# Patient Record
Sex: MALE | Race: OTHER | HISPANIC OR LATINO
[De-identification: names, ages, dates, MRNs, and addresses within clinical notes are randomized per-mention and may not be internally consistent; named-entity substitution may affect disease eponyms.]

---

## 2017-04-14 PROBLEM — Z00.00 ENCOUNTER FOR PREVENTIVE HEALTH EXAMINATION: Status: ACTIVE | Noted: 2017-04-14

## 2017-04-28 ENCOUNTER — APPOINTMENT (OUTPATIENT)
Dept: VASCULAR SURGERY | Facility: CLINIC | Age: 72
End: 2017-04-28

## 2017-04-28 VITALS
DIASTOLIC BLOOD PRESSURE: 60 MMHG | RESPIRATION RATE: 16 BRPM | HEIGHT: 67 IN | BODY MASS INDEX: 25.11 KG/M2 | TEMPERATURE: 98.4 F | SYSTOLIC BLOOD PRESSURE: 142 MMHG | HEART RATE: 70 BPM | WEIGHT: 160 LBS

## 2017-04-28 DIAGNOSIS — N18.6 END STAGE RENAL DISEASE: ICD-10-CM

## 2017-04-28 DIAGNOSIS — Z87.09 PERSONAL HISTORY OF OTHER DISEASES OF THE RESPIRATORY SYSTEM: ICD-10-CM

## 2017-04-28 DIAGNOSIS — Z99.2 END STAGE RENAL DISEASE: ICD-10-CM

## 2017-04-28 DIAGNOSIS — Z87.19 PERSONAL HISTORY OF OTHER DISEASES OF THE DIGESTIVE SYSTEM: ICD-10-CM

## 2017-05-09 ENCOUNTER — OUTPATIENT (OUTPATIENT)
Dept: OUTPATIENT SERVICES | Facility: HOSPITAL | Age: 72
LOS: 1 days | End: 2017-05-09
Payer: MEDICAID

## 2017-05-09 VITALS
RESPIRATION RATE: 16 BRPM | SYSTOLIC BLOOD PRESSURE: 156 MMHG | TEMPERATURE: 99 F | HEART RATE: 79 BPM | DIASTOLIC BLOOD PRESSURE: 71 MMHG | WEIGHT: 182.1 LBS | HEIGHT: 64 IN | OXYGEN SATURATION: 98 %

## 2017-05-09 DIAGNOSIS — I10 ESSENTIAL (PRIMARY) HYPERTENSION: ICD-10-CM

## 2017-05-09 DIAGNOSIS — Z01.818 ENCOUNTER FOR OTHER PREPROCEDURAL EXAMINATION: ICD-10-CM

## 2017-05-09 DIAGNOSIS — N18.6 END STAGE RENAL DISEASE: ICD-10-CM

## 2017-05-09 DIAGNOSIS — K08.89 OTHER SPECIFIED DISORDERS OF TEETH AND SUPPORTING STRUCTURES: ICD-10-CM

## 2017-05-09 DIAGNOSIS — K21.9 GASTRO-ESOPHAGEAL REFLUX DISEASE WITHOUT ESOPHAGITIS: ICD-10-CM

## 2017-05-09 DIAGNOSIS — Z90.49 ACQUIRED ABSENCE OF OTHER SPECIFIED PARTS OF DIGESTIVE TRACT: Chronic | ICD-10-CM

## 2017-05-09 LAB
ANION GAP SERPL CALC-SCNC: 11 MMOL/L — SIGNIFICANT CHANGE UP (ref 5–17)
APTT BLD: 33.3 SEC — SIGNIFICANT CHANGE UP (ref 27.5–37.4)
BUN SERPL-MCNC: 90 MG/DL — HIGH (ref 7–18)
CALCIUM SERPL-MCNC: 8 MG/DL — LOW (ref 8.4–10.5)
CHLORIDE SERPL-SCNC: 104 MMOL/L — SIGNIFICANT CHANGE UP (ref 96–108)
CO2 SERPL-SCNC: 25 MMOL/L — SIGNIFICANT CHANGE UP (ref 22–31)
CREAT SERPL-MCNC: 4.86 MG/DL — HIGH (ref 0.5–1.3)
GLUCOSE SERPL-MCNC: 228 MG/DL — HIGH (ref 70–99)
HCT VFR BLD CALC: 32 % — LOW (ref 39–50)
HGB BLD-MCNC: 10.5 G/DL — LOW (ref 13–17)
INR BLD: 0.99 RATIO — SIGNIFICANT CHANGE UP (ref 0.88–1.16)
MCHC RBC-ENTMCNC: 31.2 PG — SIGNIFICANT CHANGE UP (ref 27–34)
MCHC RBC-ENTMCNC: 32.9 GM/DL — SIGNIFICANT CHANGE UP (ref 32–36)
MCV RBC AUTO: 94.8 FL — SIGNIFICANT CHANGE UP (ref 80–100)
PLATELET # BLD AUTO: 222 K/UL — SIGNIFICANT CHANGE UP (ref 150–400)
POTASSIUM SERPL-MCNC: 3.9 MMOL/L — SIGNIFICANT CHANGE UP (ref 3.5–5.3)
POTASSIUM SERPL-SCNC: 3.9 MMOL/L — SIGNIFICANT CHANGE UP (ref 3.5–5.3)
PROTHROM AB SERPL-ACNC: 10.8 SEC — SIGNIFICANT CHANGE UP (ref 9.8–12.7)
RBC # BLD: 3.37 M/UL — LOW (ref 4.2–5.8)
RBC # FLD: 13.8 % — SIGNIFICANT CHANGE UP (ref 10.3–14.5)
SODIUM SERPL-SCNC: 140 MMOL/L — SIGNIFICANT CHANGE UP (ref 135–145)
WBC # BLD: 10.6 K/UL — HIGH (ref 3.8–10.5)
WBC # FLD AUTO: 10.6 K/UL — HIGH (ref 3.8–10.5)

## 2017-05-09 PROCEDURE — 85610 PROTHROMBIN TIME: CPT

## 2017-05-09 PROCEDURE — 80048 BASIC METABOLIC PNL TOTAL CA: CPT

## 2017-05-09 PROCEDURE — G0463: CPT

## 2017-05-09 PROCEDURE — 85027 COMPLETE CBC AUTOMATED: CPT

## 2017-05-09 PROCEDURE — 85730 THROMBOPLASTIN TIME PARTIAL: CPT

## 2017-05-09 RX ORDER — ASPIRIN/CALCIUM CARB/MAGNESIUM 324 MG
1 TABLET ORAL
Qty: 0 | Refills: 0 | COMMUNITY

## 2017-05-09 RX ORDER — SODIUM CHLORIDE 9 MG/ML
3 INJECTION INTRAMUSCULAR; INTRAVENOUS; SUBCUTANEOUS EVERY 8 HOURS
Qty: 0 | Refills: 0 | Status: DISCONTINUED | OUTPATIENT
Start: 2017-05-11 | End: 2017-05-11

## 2017-05-09 NOTE — H&P PST ADULT - PROBLEM SELECTOR PLAN 1
Scheduled for left arm radiocephalic arteriovenous fistula on 5/11/2017. Preoperative instructions discussed and given to patient and family. Verbalized understanding of instructions

## 2017-05-09 NOTE — H&P PST ADULT - PROBLEM SELECTOR PLAN 5
At high risk for PRIMITIVO. Case discussed with anesthesia. Agrees to f/u with PCP for referral to pulmonologist

## 2017-05-09 NOTE — H&P PST ADULT - FAMILY HISTORY
Mother  Still living? Unknown  Family history of stomach cancer, Age at diagnosis: Age Unknown     Sibling  Still living? Yes, Estimated age: Age Unknown  Family history of stomach cancer, Age at diagnosis: Age Unknown  Diabetes mellitus, type 2, Age at diagnosis: Age Unknown     Father  Still living? No  Family history of essential hypertension, Age at diagnosis: Age Unknown  Family history of heart attack, Age at diagnosis: Age Unknown

## 2017-05-09 NOTE — H&P PST ADULT - PROBLEM SELECTOR PLAN 2
Instructed to take hydralazine with a sip of water the morning of surgery and to follow up with PCP post surgery for management of hypertension and other comorbid disorders

## 2017-05-09 NOTE — H&P PST ADULT - NEGATIVE CARDIOVASCULAR SYMPTOMS
no paroxysmal nocturnal dyspnea/no claudication/no palpitations/no orthopnea/no dyspnea on exertion/no chest pain/no peripheral edema

## 2017-05-09 NOTE — H&P PST ADULT - NEGATIVE GENERAL SYMPTOMS
no weight loss/no anorexia/no malaise/no polydipsia/no sweating/no polyphagia/no weight gain/no polyuria/no chills/no fatigue/no fever

## 2017-05-09 NOTE — H&P PST ADULT - NSANTHOSAYNRD_GEN_A_CORE
No. PRIMITIVO screening performed.  STOP BANG Legend: 0-2 = LOW Risk; 3-4 = INTERMEDIATE Risk; 5-8 = HIGH Risk

## 2017-05-09 NOTE — H&P PST ADULT - ASSESSMENT
71 y/o  male with PMH of hypertension, hyperlipidemia, GERD, T2DM, and ERSD scheduled for left arm radiocephalic arteriovenous fistula on 5/11/2017 73 y/o  male with PMH of hypertension, hyperlipidemia, GERD, T2DM, loose tooth (upper left), and ERSD scheduled for left arm radiocephalic arteriovenous fistula on 5/11/2017. Patient is at moderate risk for planned procedure 73 y/o  male with hypertension, high PRIMITIVO risk, hyperlipidemia, GERD, T2DM, loose of tooth (upper left), and ERSD scheduled for left arm radiocephalic arteriovenous fistula on 5/11/2017. Patient is at moderate risk for planned procedure 71 y/o  male with hypertension, high PRIMITIVO risk on STOP BANG screen, hyperlipidemia, GERD, T2DM, loose of tooth (upper left), and ERSD scheduled for left arm radiocephalic arteriovenous fistula on 5/11/2017. Patient is at moderate risk for planned procedure

## 2017-05-09 NOTE — H&P PST ADULT - PMH
Chronic gout of multiple sites, unspecified cause    Diabetes mellitus, type 2    Enlarged prostate    ESRD (end stage renal disease)    Essential (primary) hypertension    GERD without esophagitis    Head injury with loss of consciousness  2014  Hyperlipidemia, unspecified hyperlipidemia type    Stage 4 chronic kidney disease

## 2017-05-09 NOTE — H&P PST ADULT - HISTORY OF PRESENT ILLNESS
71 y/o  male with PMH of hypertension, hyperlipidemia, GERD, T2DM and chronic renal disease is here for presurgical evaluation. He was recently diagnosed with ESRD and is scheduled for left arm radiocephalic arteriovenous fistula on 5/11/2017 71 y/o  male with PMH of hypertension, hyperlipidemia, GERD, T2DM and chronic renal disease is here for presurgical evaluation. He was recently diagnosed with ESRD and is scheduled for left arm radiocephalic arteriovenous fistula on 5/11/2017, in preparation to start hemodialysis

## 2017-05-10 ENCOUNTER — CHART COPY (OUTPATIENT)
Age: 72
End: 2017-05-10

## 2017-05-10 DIAGNOSIS — G47.33 OBSTRUCTIVE SLEEP APNEA (ADULT) (PEDIATRIC): ICD-10-CM

## 2017-05-11 ENCOUNTER — APPOINTMENT (OUTPATIENT)
Dept: VASCULAR SURGERY | Facility: HOSPITAL | Age: 72
End: 2017-05-11

## 2017-05-11 ENCOUNTER — OUTPATIENT (OUTPATIENT)
Dept: OUTPATIENT SERVICES | Facility: HOSPITAL | Age: 72
LOS: 1 days | Discharge: HOME | End: 2017-05-11
Payer: MEDICAID

## 2017-05-11 VITALS
HEIGHT: 64 IN | WEIGHT: 182.1 LBS | SYSTOLIC BLOOD PRESSURE: 160 MMHG | OXYGEN SATURATION: 99 % | RESPIRATION RATE: 17 BRPM | TEMPERATURE: 98 F | HEART RATE: 78 BPM | DIASTOLIC BLOOD PRESSURE: 70 MMHG

## 2017-05-11 VITALS
OXYGEN SATURATION: 99 % | TEMPERATURE: 98 F | DIASTOLIC BLOOD PRESSURE: 68 MMHG | RESPIRATION RATE: 17 BRPM | SYSTOLIC BLOOD PRESSURE: 149 MMHG | HEART RATE: 90 BPM

## 2017-05-11 DIAGNOSIS — Z90.49 ACQUIRED ABSENCE OF OTHER SPECIFIED PARTS OF DIGESTIVE TRACT: Chronic | ICD-10-CM

## 2017-05-11 DIAGNOSIS — N18.6 END STAGE RENAL DISEASE: ICD-10-CM

## 2017-05-11 DIAGNOSIS — Z01.818 ENCOUNTER FOR OTHER PREPROCEDURAL EXAMINATION: ICD-10-CM

## 2017-05-11 LAB
ANION GAP SERPL CALC-SCNC: 9 MMOL/L — SIGNIFICANT CHANGE UP (ref 5–17)
BUN SERPL-MCNC: 90 MG/DL — HIGH (ref 7–18)
CALCIUM SERPL-MCNC: 8.2 MG/DL — LOW (ref 8.4–10.5)
CHLORIDE SERPL-SCNC: 107 MMOL/L — SIGNIFICANT CHANGE UP (ref 96–108)
CO2 SERPL-SCNC: 25 MMOL/L — SIGNIFICANT CHANGE UP (ref 22–31)
CREAT SERPL-MCNC: 4.61 MG/DL — HIGH (ref 0.5–1.3)
GLUCOSE SERPL-MCNC: 197 MG/DL — HIGH (ref 70–99)
POTASSIUM SERPL-MCNC: 4.2 MMOL/L — SIGNIFICANT CHANGE UP (ref 3.5–5.3)
POTASSIUM SERPL-SCNC: 4.2 MMOL/L — SIGNIFICANT CHANGE UP (ref 3.5–5.3)
SODIUM SERPL-SCNC: 141 MMOL/L — SIGNIFICANT CHANGE UP (ref 135–145)

## 2017-05-11 PROCEDURE — 36818 AV FUSE UPPR ARM CEPHALIC: CPT | Mod: LT

## 2017-05-11 PROCEDURE — 80048 BASIC METABOLIC PNL TOTAL CA: CPT

## 2017-05-11 PROCEDURE — 36820 AV FUSION/FOREARM VEIN: CPT | Mod: AS

## 2017-05-11 RX ORDER — OXYCODONE HYDROCHLORIDE 5 MG/1
1 TABLET ORAL
Qty: 15 | Refills: 0
Start: 2017-05-11

## 2017-05-11 RX ORDER — FENTANYL CITRATE 50 UG/ML
25 INJECTION INTRAVENOUS
Qty: 0 | Refills: 0 | Status: DISCONTINUED | OUTPATIENT
Start: 2017-05-11 | End: 2017-05-11

## 2017-05-11 RX ORDER — ONDANSETRON 8 MG/1
4 TABLET, FILM COATED ORAL ONCE
Qty: 0 | Refills: 0 | Status: DISCONTINUED | OUTPATIENT
Start: 2017-05-11 | End: 2017-05-11

## 2017-05-11 RX ORDER — SODIUM CHLORIDE 9 MG/ML
1000 INJECTION INTRAMUSCULAR; INTRAVENOUS; SUBCUTANEOUS
Qty: 0 | Refills: 0 | Status: DISCONTINUED | OUTPATIENT
Start: 2017-05-11 | End: 2017-05-11

## 2017-05-11 RX ADMIN — SODIUM CHLORIDE 3 MILLILITER(S): 9 INJECTION INTRAMUSCULAR; INTRAVENOUS; SUBCUTANEOUS at 07:26

## 2017-05-11 NOTE — ASU DISCHARGE PLAN (ADULT/PEDIATRIC). - MEDICATION SUMMARY - MEDICATIONS TO TAKE
I will START or STAY ON the medications listed below when I get home from the hospital:    Vitamin C  -- 1 tab(s) by mouth once a day  -- Indication: For vitamin    aspirin 81 mg oral tablet  -- 1 tab(s) by mouth once a day  -- Indication: For cardiac    acetaminophen-oxycodone 325 mg-5 mg oral tablet  -- 1 tab(s) by mouth every 6 hours, As needed, Severe Pain (7 - 10) MDD:4 tabs  -- Indication: For pain    Januvia 100 mg oral tablet  -- 1 tab(s) by mouth once a day  -- Indication: For dm    glipiZIDE 10 mg oral tablet  -- 1 tab(s) by mouth once a day  -- Indication: For dm    Invokana 300 mg oral tablet  -- 1 tab(s) by mouth once a day  -- Indication: For dm    allopurinol 100 mg oral tablet  -- 1 tab(s) by mouth 2 times a day  -- Indication: For gout    simvastatin 40 mg oral tablet  -- 1 tab(s) by mouth once a day (at bedtime)  -- Indication: For cholesterol    carvedilol 25 mg oral tablet  -- 1 tab(s) by mouth 2 times a day  -- Indication: For cardiac    furosemide 40 mg oral tablet  -- 1 tab(s) by mouth once a day  -- Indication: For diuretic    ferrous sulfate 325 mg (65 mg elemental iron) oral delayed release tablet  -- 1 tab(s) by mouth once a day  -- Indication: For iron suppliment    omeprazole 40 mg oral delayed release capsule  -- 1 cap(s) by mouth once a day  -- Indication: For gerd    hydrALAZINE 25 mg oral tablet  -- 1 tab(s) by mouth 4 times a day  -- Indication: For htn    calcitriol 0.25 mcg oral capsule  -- 1 cap(s) by mouth every other day (at bedtime)  -- Indication: For vitamin

## 2017-05-11 NOTE — ASU DISCHARGE PLAN (ADULT/PEDIATRIC). - NOTIFY
Bleeding that does not stop/Fever greater than 101/Persistent Nausea and Vomiting/Pain not relieved by Medications/Numbness, color, or temperature change to extremity

## 2017-05-15 ENCOUNTER — TRANSCRIPTION ENCOUNTER (OUTPATIENT)
Age: 72
End: 2017-05-15

## 2017-05-18 DIAGNOSIS — N18.6 END STAGE RENAL DISEASE: ICD-10-CM

## 2017-05-18 DIAGNOSIS — N40.0 BENIGN PROSTATIC HYPERPLASIA WITHOUT LOWER URINARY TRACT SYMPTOMS: ICD-10-CM

## 2017-05-18 DIAGNOSIS — E11.9 TYPE 2 DIABETES MELLITUS WITHOUT COMPLICATIONS: ICD-10-CM

## 2017-05-18 DIAGNOSIS — E78.5 HYPERLIPIDEMIA, UNSPECIFIED: ICD-10-CM

## 2017-05-18 DIAGNOSIS — I12.0 HYPERTENSIVE CHRONIC KIDNEY DISEASE WITH STAGE 5 CHRONIC KIDNEY DISEASE OR END STAGE RENAL DISEASE: ICD-10-CM

## 2017-05-18 DIAGNOSIS — K21.9 GASTRO-ESOPHAGEAL REFLUX DISEASE WITHOUT ESOPHAGITIS: ICD-10-CM

## 2017-05-18 DIAGNOSIS — Z87.891 PERSONAL HISTORY OF NICOTINE DEPENDENCE: ICD-10-CM

## 2017-05-18 DIAGNOSIS — Z79.82 LONG TERM (CURRENT) USE OF ASPIRIN: ICD-10-CM

## 2017-05-18 DIAGNOSIS — M10.9 GOUT, UNSPECIFIED: ICD-10-CM

## 2017-05-22 ENCOUNTER — APPOINTMENT (OUTPATIENT)
Dept: VASCULAR SURGERY | Facility: CLINIC | Age: 72
End: 2017-05-22

## 2017-05-22 VITALS — HEART RATE: 76 BPM | SYSTOLIC BLOOD PRESSURE: 151 MMHG | DIASTOLIC BLOOD PRESSURE: 83 MMHG

## 2017-05-22 VITALS — RESPIRATION RATE: 16 BRPM | HEIGHT: 67 IN | BODY MASS INDEX: 25.11 KG/M2 | WEIGHT: 160 LBS | TEMPERATURE: 98.4 F

## 2017-05-22 DIAGNOSIS — T82.898A OTHER SPECIFIED COMPLICATION OF VASCULAR PROSTHETIC DEVICES, IMPLANTS AND GRAFTS, INITIAL ENCOUNTER: ICD-10-CM

## 2017-05-23 ENCOUNTER — FORM ENCOUNTER (OUTPATIENT)
Age: 72
End: 2017-05-23

## 2017-05-24 ENCOUNTER — APPOINTMENT (OUTPATIENT)
Age: 72
End: 2017-05-24

## 2017-06-02 ENCOUNTER — APPOINTMENT (OUTPATIENT)
Dept: VASCULAR SURGERY | Facility: CLINIC | Age: 72
End: 2017-06-02

## 2017-06-02 VITALS
WEIGHT: 160 LBS | HEIGHT: 67 IN | TEMPERATURE: 98.6 F | SYSTOLIC BLOOD PRESSURE: 150 MMHG | DIASTOLIC BLOOD PRESSURE: 60 MMHG | RESPIRATION RATE: 16 BRPM | BODY MASS INDEX: 25.11 KG/M2 | HEART RATE: 70 BPM

## 2017-07-21 ENCOUNTER — APPOINTMENT (OUTPATIENT)
Dept: VASCULAR SURGERY | Facility: CLINIC | Age: 72
End: 2017-07-21

## 2017-09-21 ENCOUNTER — FORM ENCOUNTER (OUTPATIENT)
Age: 72
End: 2017-09-21

## 2017-09-22 ENCOUNTER — APPOINTMENT (OUTPATIENT)
Age: 72
End: 2017-09-22
Payer: MEDICARE

## 2017-09-22 PROCEDURE — 36902Z: CUSTOM | Mod: 59

## 2017-09-22 PROCEDURE — 36215Z: CUSTOM | Mod: 59

## 2017-09-22 PROCEDURE — 36909Z: CUSTOM

## 2017-09-22 PROCEDURE — 36011Z: CUSTOM | Mod: 59

## 2017-10-05 ENCOUNTER — FORM ENCOUNTER (OUTPATIENT)
Age: 72
End: 2017-10-05

## 2017-10-06 ENCOUNTER — APPOINTMENT (OUTPATIENT)
Age: 72
End: 2017-10-06
Payer: MEDICARE

## 2017-10-06 PROCEDURE — 36902Z: CUSTOM

## 2017-10-19 ENCOUNTER — APPOINTMENT (OUTPATIENT)
Dept: VASCULAR SURGERY | Facility: CLINIC | Age: 72
End: 2017-10-19
Payer: MEDICARE

## 2017-10-19 PROCEDURE — 99213 OFFICE O/P EST LOW 20 MIN: CPT

## 2017-10-19 PROCEDURE — 93990 DOPPLER FLOW TESTING: CPT

## 2018-02-20 ENCOUNTER — APPOINTMENT (OUTPATIENT)
Dept: VASCULAR SURGERY | Facility: CLINIC | Age: 73
End: 2018-02-20

## 2018-07-18 ENCOUNTER — APPOINTMENT (OUTPATIENT)
Dept: TRANSPLANT | Facility: CLINIC | Age: 73
End: 2018-07-18
Payer: COMMERCIAL

## 2018-07-18 ENCOUNTER — APPOINTMENT (OUTPATIENT)
Dept: NEPHROLOGY | Facility: CLINIC | Age: 73
End: 2018-07-18
Payer: COMMERCIAL

## 2018-07-18 VITALS
TEMPERATURE: 98.2 F | OXYGEN SATURATION: 97 % | HEART RATE: 69 BPM | SYSTOLIC BLOOD PRESSURE: 176 MMHG | DIASTOLIC BLOOD PRESSURE: 89 MMHG | BODY MASS INDEX: 28.98 KG/M2 | WEIGHT: 185 LBS | RESPIRATION RATE: 16 BRPM

## 2018-07-18 PROCEDURE — 99214 OFFICE O/P EST MOD 30 MIN: CPT

## 2018-07-18 PROCEDURE — 99205 OFFICE O/P NEW HI 60 MIN: CPT

## 2018-07-18 RX ORDER — UMECLIDINIUM BROMIDE AND VILANTEROL TRIFENATATE 62.5; 25 UG/1; UG/1
62.5-25 POWDER RESPIRATORY (INHALATION)
Qty: 60 | Refills: 0 | Status: DISCONTINUED | COMMUNITY
Start: 2017-02-13 | End: 2018-07-18

## 2018-07-18 RX ORDER — HYDRALAZINE HYDROCHLORIDE 25 MG/1
25 TABLET ORAL
Qty: 180 | Refills: 0 | Status: DISCONTINUED | COMMUNITY
Start: 2016-11-15 | End: 2018-07-18

## 2018-07-18 RX ORDER — CANAGLIFLOZIN 300 MG/1
300 TABLET, FILM COATED ORAL
Qty: 90 | Refills: 0 | Status: DISCONTINUED | COMMUNITY
Start: 2017-03-12 | End: 2018-07-18

## 2018-07-18 RX ORDER — CALCITRIOL 0.25 UG/1
0.25 CAPSULE, LIQUID FILLED ORAL
Qty: 15 | Refills: 0 | Status: DISCONTINUED | COMMUNITY
Start: 2017-03-02 | End: 2018-07-18

## 2018-07-18 RX ORDER — SITAGLIPTIN 100 MG/1
100 TABLET, FILM COATED ORAL
Qty: 90 | Refills: 0 | Status: DISCONTINUED | COMMUNITY
Start: 2016-12-22 | End: 2018-07-18

## 2018-07-18 RX ORDER — LABETALOL HYDROCHLORIDE 200 MG/1
200 TABLET, FILM COATED ORAL
Qty: 60 | Refills: 0 | Status: DISCONTINUED | COMMUNITY
Start: 2017-03-02 | End: 2018-07-18

## 2018-07-18 RX ORDER — OXYCODONE AND ACETAMINOPHEN 5; 325 MG/1; MG/1
5-325 TABLET ORAL
Qty: 15 | Refills: 0 | Status: DISCONTINUED | COMMUNITY
Start: 2017-05-11 | End: 2018-07-18

## 2018-07-18 RX ORDER — LEVOFLOXACIN 500 MG/1
500 TABLET, FILM COATED ORAL
Qty: 14 | Refills: 0 | Status: DISCONTINUED | COMMUNITY
Start: 2017-03-28 | End: 2018-07-18

## 2018-07-18 RX ORDER — SIMVASTATIN 20 MG/1
20 TABLET, FILM COATED ORAL
Qty: 90 | Refills: 0 | Status: DISCONTINUED | COMMUNITY
Start: 2016-12-23 | End: 2018-07-18

## 2018-07-18 RX ORDER — GEMFIBROZIL 600 MG/1
600 TABLET, FILM COATED ORAL
Qty: 180 | Refills: 0 | Status: DISCONTINUED | COMMUNITY
Start: 2016-11-10 | End: 2018-07-18

## 2018-07-19 PROBLEM — S06.9X9A UNSPECIFIED INTRACRANIAL INJURY WITH LOSS OF CONSCIOUSNESS OF UNSPECIFIED DURATION, INITIAL ENCOUNTER: Chronic | Status: ACTIVE | Noted: 2017-05-09

## 2018-07-19 PROBLEM — I10 ESSENTIAL (PRIMARY) HYPERTENSION: Chronic | Status: ACTIVE | Noted: 2017-05-09

## 2018-07-19 PROBLEM — E11.9 TYPE 2 DIABETES MELLITUS WITHOUT COMPLICATIONS: Chronic | Status: ACTIVE | Noted: 2017-05-09

## 2018-07-19 PROBLEM — E78.5 HYPERLIPIDEMIA, UNSPECIFIED: Chronic | Status: ACTIVE | Noted: 2017-05-09

## 2018-07-19 PROBLEM — N40.0 BENIGN PROSTATIC HYPERPLASIA WITHOUT LOWER URINARY TRACT SYMPTOMS: Chronic | Status: ACTIVE | Noted: 2017-05-09

## 2018-07-19 PROBLEM — N18.6 END STAGE RENAL DISEASE: Chronic | Status: ACTIVE | Noted: 2017-05-09

## 2018-07-19 PROBLEM — K21.9 GASTRO-ESOPHAGEAL REFLUX DISEASE WITHOUT ESOPHAGITIS: Chronic | Status: ACTIVE | Noted: 2017-05-09

## 2018-07-19 PROBLEM — M1A.09X0 IDIOPATHIC CHRONIC GOUT, MULTIPLE SITES, WITHOUT TOPHUS (TOPHI): Chronic | Status: ACTIVE | Noted: 2017-05-09

## 2018-07-19 PROBLEM — N18.4 CHRONIC KIDNEY DISEASE, STAGE 4 (SEVERE): Chronic | Status: ACTIVE | Noted: 2017-05-09

## 2018-07-19 LAB
ABO + RH PNL BLD: NORMAL
ABO + RH PNL BLD: NORMAL
ALBUMIN SERPL ELPH-MCNC: 4.3 G/DL
ALP BLD-CCNC: 73 U/L
ALT SERPL-CCNC: 30 U/L
ANION GAP SERPL CALC-SCNC: 16 MMOL/L
APPEARANCE: CLEAR
AST SERPL-CCNC: 27 U/L
BACTERIA: NEGATIVE
BASOPHILS # BLD AUTO: 0.08 K/UL
BASOPHILS NFR BLD AUTO: 1.2 %
BILIRUB SERPL-MCNC: 0.5 MG/DL
BILIRUBIN URINE: NEGATIVE
BLOOD URINE: ABNORMAL
BUN SERPL-MCNC: 34 MG/DL
C PEPTIDE SERPL-MCNC: 16.7 NG/ML
CALCIUM SERPL-MCNC: 9.5 MG/DL
CHLORIDE SERPL-SCNC: 94 MMOL/L
CMV IGG SERPL QL: 2.7 U/ML
CMV IGG SERPL-IMP: POSITIVE
CO2 SERPL-SCNC: 25 MMOL/L
COLOR: YELLOW
CREAT SERPL-MCNC: 4.71 MG/DL
CREAT SPEC-SCNC: 71 MG/DL
CREAT/PROT UR: 4.9 RATIO
EBV EA AB SER IA-ACNC: <5 U/ML
EBV EA AB TITR SER IF: POSITIVE
EBV EA IGG SER QL IA: >600 U/ML
EBV EA IGG SER-ACNC: NEGATIVE
EBV EA IGM SER IA-ACNC: NEGATIVE
EBV PATRN SPEC IB-IMP: NORMAL
EBV VCA IGG SER IA-ACNC: >750 U/ML
EBV VCA IGM SER QL IA: <10 U/ML
EOSINOPHIL # BLD AUTO: 0.29 K/UL
EOSINOPHIL NFR BLD AUTO: 4.2 %
EPSTEIN-BARR VIRUS CAPSID ANTIGEN IGG: POSITIVE
GLUCOSE QUALITATIVE U: 250 MG/DL
GLUCOSE SERPL-MCNC: 132 MG/DL
HAV IGM SER QL: NONREACTIVE
HBV CORE IGG+IGM SER QL: NONREACTIVE
HBV SURFACE AB SER QL: REACTIVE
HBV SURFACE AG SER QL: NONREACTIVE
HCT VFR BLD CALC: 34.9 %
HCV AB SER QL: NONREACTIVE
HCV S/CO RATIO: 0.09 S/CO
HGB BLD-MCNC: 11.1 G/DL
HIV1+2 AB SPEC QL IA.RAPID: NONREACTIVE
HSV 1+2 IGG SER IA-IMP: NEGATIVE
HSV 1+2 IGG SER IA-IMP: POSITIVE
HSV1 IGG SER QL: 34.8 INDEX
HSV2 IGG SER QL: 0.09 INDEX
HYALINE CASTS: 8 /LPF
IMM GRANULOCYTES NFR BLD AUTO: 1.2 %
KETONES URINE: NEGATIVE
LEUKOCYTE ESTERASE URINE: NEGATIVE
LYMPHOCYTES # BLD AUTO: 1.71 K/UL
LYMPHOCYTES NFR BLD AUTO: 24.7 %
MAGNESIUM SERPL-MCNC: 2.2 MG/DL
MAN DIFF?: NORMAL
MCHC RBC-ENTMCNC: 30.3 PG
MCHC RBC-ENTMCNC: 31.8 GM/DL
MCV RBC AUTO: 95.4 FL
MICROSCOPIC-UA: NORMAL
MONOCYTES # BLD AUTO: 0.37 K/UL
MONOCYTES NFR BLD AUTO: 5.3 %
NEUTROPHILS # BLD AUTO: 4.4 K/UL
NEUTROPHILS NFR BLD AUTO: 63.4 %
NITRITE URINE: NEGATIVE
PH URINE: 6.5
PHOSPHATE SERPL-MCNC: 3.6 MG/DL
PLATELET # BLD AUTO: 202 K/UL
POTASSIUM SERPL-SCNC: 4.4 MMOL/L
PROT SERPL-MCNC: 8.1 G/DL
PROT UR-MCNC: 346 MG/DL
PROTEIN URINE: 300 MG/DL
PSA SERPL-MCNC: 0.91 NG/ML
RBC # BLD: 3.66 M/UL
RBC # FLD: 15.2 %
RED BLOOD CELLS URINE: 3 /HPF
RPR SER-TITR: NORMAL
RUBV IGG FLD-ACNC: 20 INDEX
RUBV IGG SER-IMP: POSITIVE
SODIUM SERPL-SCNC: 135 MMOL/L
SPECIFIC GRAVITY URINE: 1.01
SQUAMOUS EPITHELIAL CELLS: 1 /HPF
T GONDII AB SER-IMP: POSITIVE
T GONDII IGG SER QL: 32.2 IU/ML
URATE SERPL-MCNC: 4.5 MG/DL
UROBILINOGEN URINE: NEGATIVE MG/DL
VZV AB TITR SER: POSITIVE
VZV IGG SER IF-ACNC: 2865 INDEX
WBC # FLD AUTO: 6.93 K/UL
WHITE BLOOD CELLS URINE: 4 /HPF

## 2018-07-20 LAB
EBV DNA SERPL NAA+PROBE-ACNC: NOT DETECTED
EBVPCR LOG: NOT DETECTED LOGIU/ML

## 2018-08-20 ENCOUNTER — APPOINTMENT (OUTPATIENT)
Dept: CARDIOLOGY | Facility: CLINIC | Age: 73
End: 2018-08-20
Payer: COMMERCIAL

## 2018-08-20 ENCOUNTER — APPOINTMENT (OUTPATIENT)
Dept: RADIOLOGY | Facility: CLINIC | Age: 73
End: 2018-08-20
Payer: COMMERCIAL

## 2018-08-20 ENCOUNTER — NON-APPOINTMENT (OUTPATIENT)
Age: 73
End: 2018-08-20

## 2018-08-20 ENCOUNTER — APPOINTMENT (OUTPATIENT)
Dept: ULTRASOUND IMAGING | Facility: CLINIC | Age: 73
End: 2018-08-20
Payer: COMMERCIAL

## 2018-08-20 ENCOUNTER — OUTPATIENT (OUTPATIENT)
Dept: OUTPATIENT SERVICES | Facility: HOSPITAL | Age: 73
LOS: 1 days | End: 2018-08-20
Payer: COMMERCIAL

## 2018-08-20 ENCOUNTER — APPOINTMENT (OUTPATIENT)
Dept: ULTRASOUND IMAGING | Facility: CLINIC | Age: 73
End: 2018-08-20

## 2018-08-20 VITALS
WEIGHT: 184 LBS | HEART RATE: 80 BPM | DIASTOLIC BLOOD PRESSURE: 78 MMHG | BODY MASS INDEX: 28.82 KG/M2 | OXYGEN SATURATION: 98 % | SYSTOLIC BLOOD PRESSURE: 162 MMHG

## 2018-08-20 VITALS — SYSTOLIC BLOOD PRESSURE: 155 MMHG | DIASTOLIC BLOOD PRESSURE: 80 MMHG

## 2018-08-20 DIAGNOSIS — Z90.49 ACQUIRED ABSENCE OF OTHER SPECIFIED PARTS OF DIGESTIVE TRACT: Chronic | ICD-10-CM

## 2018-08-20 DIAGNOSIS — Z01.818 ENCOUNTER FOR OTHER PREPROCEDURAL EXAMINATION: ICD-10-CM

## 2018-08-20 PROCEDURE — 99204 OFFICE O/P NEW MOD 45 MIN: CPT

## 2018-08-20 PROCEDURE — 93976 VASCULAR STUDY: CPT | Mod: 26

## 2018-08-20 PROCEDURE — 76700 US EXAM ABDOM COMPLETE: CPT | Mod: 26,59

## 2018-08-20 PROCEDURE — 71046 X-RAY EXAM CHEST 2 VIEWS: CPT

## 2018-08-20 PROCEDURE — 71046 X-RAY EXAM CHEST 2 VIEWS: CPT | Mod: 26

## 2018-08-20 PROCEDURE — 93975 VASCULAR STUDY: CPT

## 2018-08-20 PROCEDURE — 76700 US EXAM ABDOM COMPLETE: CPT

## 2018-08-20 PROCEDURE — 93000 ELECTROCARDIOGRAM COMPLETE: CPT

## 2018-10-01 ENCOUNTER — APPOINTMENT (OUTPATIENT)
Dept: CARDIOLOGY | Facility: CLINIC | Age: 73
End: 2018-10-01
Payer: COMMERCIAL

## 2018-10-01 PROCEDURE — 78452 HT MUSCLE IMAGE SPECT MULT: CPT

## 2018-10-01 PROCEDURE — A9500: CPT

## 2018-10-01 PROCEDURE — 93306 TTE W/DOPPLER COMPLETE: CPT

## 2018-10-01 PROCEDURE — 93015 CV STRESS TEST SUPVJ I&R: CPT

## 2018-10-16 ENCOUNTER — APPOINTMENT (OUTPATIENT)
Dept: NEPHROLOGY | Facility: CLINIC | Age: 73
End: 2018-10-16

## 2018-10-26 ENCOUNTER — APPOINTMENT (OUTPATIENT)
Dept: GASTROENTEROLOGY | Facility: CLINIC | Age: 73
End: 2018-10-26
Payer: COMMERCIAL

## 2018-10-26 ENCOUNTER — APPOINTMENT (OUTPATIENT)
Dept: TRANSPLANT | Facility: CLINIC | Age: 73
End: 2018-10-26

## 2018-10-26 VITALS
BODY MASS INDEX: 29.73 KG/M2 | SYSTOLIC BLOOD PRESSURE: 134 MMHG | WEIGHT: 185 LBS | HEART RATE: 76 BPM | HEIGHT: 66 IN | DIASTOLIC BLOOD PRESSURE: 70 MMHG

## 2018-10-26 DIAGNOSIS — Z80.0 FAMILY HISTORY OF MALIGNANT NEOPLASM OF DIGESTIVE ORGANS: ICD-10-CM

## 2018-10-26 DIAGNOSIS — R13.10 DYSPHAGIA, UNSPECIFIED: ICD-10-CM

## 2018-10-26 DIAGNOSIS — Z86.010 PERSONAL HISTORY OF COLONIC POLYPS: ICD-10-CM

## 2018-10-26 DIAGNOSIS — Z87.891 PERSONAL HISTORY OF NICOTINE DEPENDENCE: ICD-10-CM

## 2018-10-26 PROCEDURE — 99204 OFFICE O/P NEW MOD 45 MIN: CPT

## 2018-10-26 RX ORDER — SIMVASTATIN 40 MG/1
40 TABLET, FILM COATED ORAL
Refills: 0 | Status: DISCONTINUED | COMMUNITY
Start: 2016-12-23 | End: 2018-10-26

## 2018-10-29 LAB — HBA1C MFR BLD HPLC: 7.2 %

## 2018-11-05 ENCOUNTER — APPOINTMENT (OUTPATIENT)
Dept: VASCULAR SURGERY | Facility: CLINIC | Age: 73
End: 2018-11-05
Payer: MEDICARE

## 2018-11-05 VITALS
BODY MASS INDEX: 29.41 KG/M2 | DIASTOLIC BLOOD PRESSURE: 95 MMHG | HEART RATE: 74 BPM | SYSTOLIC BLOOD PRESSURE: 198 MMHG | TEMPERATURE: 98 F | WEIGHT: 182.98 LBS | HEIGHT: 66 IN

## 2018-11-05 PROCEDURE — 93990 DOPPLER FLOW TESTING: CPT

## 2018-11-05 PROCEDURE — 99214 OFFICE O/P EST MOD 30 MIN: CPT

## 2018-11-06 ENCOUNTER — FORM ENCOUNTER (OUTPATIENT)
Age: 73
End: 2018-11-06

## 2018-11-07 ENCOUNTER — APPOINTMENT (OUTPATIENT)
Dept: ENDOVASCULAR SURGERY | Facility: CLINIC | Age: 73
End: 2018-11-07
Payer: MEDICARE

## 2018-11-07 PROCEDURE — 36215Z: CUSTOM | Mod: 59

## 2018-11-07 PROCEDURE — 36902Z: CUSTOM

## 2018-12-20 ENCOUNTER — CHART COPY (OUTPATIENT)
Age: 73
End: 2018-12-20

## 2019-01-07 ENCOUNTER — APPOINTMENT (OUTPATIENT)
Dept: TRANSPLANT | Facility: CLINIC | Age: 74
End: 2019-01-07

## 2019-01-14 ENCOUNTER — APPOINTMENT (OUTPATIENT)
Dept: TRANSPLANT | Facility: CLINIC | Age: 74
End: 2019-01-14

## 2019-01-15 LAB — ABO + RH PNL BLD: NORMAL

## 2019-02-04 ENCOUNTER — APPOINTMENT (OUTPATIENT)
Dept: VASCULAR SURGERY | Facility: CLINIC | Age: 74
End: 2019-02-04

## 2019-08-13 ENCOUNTER — APPOINTMENT (OUTPATIENT)
Dept: TRANSPLANT | Facility: CLINIC | Age: 74
End: 2019-08-13

## 2019-08-13 ENCOUNTER — APPOINTMENT (OUTPATIENT)
Dept: NEPHROLOGY | Facility: CLINIC | Age: 74
End: 2019-08-13

## 2020-04-03 ENCOUNTER — APPOINTMENT (OUTPATIENT)
Dept: TRANSPLANT | Facility: CLINIC | Age: 75
End: 2020-04-03

## 2020-07-10 ENCOUNTER — APPOINTMENT (OUTPATIENT)
Dept: NEPHROLOGY | Facility: CLINIC | Age: 75
End: 2020-07-10

## 2020-07-15 ENCOUNTER — APPOINTMENT (OUTPATIENT)
Dept: CT IMAGING | Facility: CLINIC | Age: 75
End: 2020-07-15
Payer: COMMERCIAL

## 2020-07-15 ENCOUNTER — OUTPATIENT (OUTPATIENT)
Dept: OUTPATIENT SERVICES | Facility: HOSPITAL | Age: 75
LOS: 1 days | End: 2020-07-15
Payer: COMMERCIAL

## 2020-07-15 ENCOUNTER — APPOINTMENT (OUTPATIENT)
Dept: CARDIOLOGY | Facility: CLINIC | Age: 75
End: 2020-07-15
Payer: COMMERCIAL

## 2020-07-15 ENCOUNTER — APPOINTMENT (OUTPATIENT)
Dept: RADIOLOGY | Facility: CLINIC | Age: 75
End: 2020-07-15
Payer: COMMERCIAL

## 2020-07-15 ENCOUNTER — NON-APPOINTMENT (OUTPATIENT)
Age: 75
End: 2020-07-15

## 2020-07-15 ENCOUNTER — RESULT REVIEW (OUTPATIENT)
Age: 75
End: 2020-07-15

## 2020-07-15 VITALS
OXYGEN SATURATION: 95 % | DIASTOLIC BLOOD PRESSURE: 84 MMHG | HEART RATE: 76 BPM | BODY MASS INDEX: 30.18 KG/M2 | SYSTOLIC BLOOD PRESSURE: 191 MMHG | TEMPERATURE: 97.3 F | WEIGHT: 187 LBS

## 2020-07-15 VITALS — SYSTOLIC BLOOD PRESSURE: 140 MMHG | DIASTOLIC BLOOD PRESSURE: 80 MMHG

## 2020-07-15 DIAGNOSIS — Z90.49 ACQUIRED ABSENCE OF OTHER SPECIFIED PARTS OF DIGESTIVE TRACT: Chronic | ICD-10-CM

## 2020-07-15 DIAGNOSIS — Z00.8 ENCOUNTER FOR OTHER GENERAL EXAMINATION: ICD-10-CM

## 2020-07-15 PROCEDURE — 71046 X-RAY EXAM CHEST 2 VIEWS: CPT | Mod: 26

## 2020-07-15 PROCEDURE — 74177 CT ABD & PELVIS W/CONTRAST: CPT | Mod: 26

## 2020-07-15 PROCEDURE — 93000 ELECTROCARDIOGRAM COMPLETE: CPT | Mod: NC

## 2020-07-15 PROCEDURE — 74177 CT ABD & PELVIS W/CONTRAST: CPT

## 2020-07-15 PROCEDURE — 99215 OFFICE O/P EST HI 40 MIN: CPT

## 2020-07-15 PROCEDURE — 71046 X-RAY EXAM CHEST 2 VIEWS: CPT

## 2020-07-15 NOTE — REASON FOR VISIT
[Follow-Up - Clinic] : a clinic follow-up of [FreeTextEntry2] : pretransplant cardiac evaluation prior to possible renal transplant [FreeTextEntry1] : July 2020 - Patient returns today in his usual state of health. He continues to have dialysis on [facility] via left forearm AV fistula.\par He and his family have not been sick during the Covid pandemic.\par He does not exercise.

## 2020-07-15 NOTE — HISTORY OF PRESENT ILLNESS
[FreeTextEntry1] : Patient is a 75 year-old gentleman with known cardiovascular risk factors of hypertension, non-insulin dependent type II diabetes (needs better control and is planning to establish care with endocrinologist), and ESRD on HD (Tues-Thurs-Sat) who presents today in his usual state of health for evaluation prior to possible renal transplant.\par Patient has no chest pains or shortness of breath.\par Patient does not formally exercise, but he walks regularly and he plays pool. \par Patient used to follow with a cardiologist, but he has not seen him recently, and his most recent ischemic evaluation was approximately five years ago (negative stress test).\par \par PMD: Isabel Martinez MD [phone]\par Nephrologist: Kodi Olivares MD [phone]\par Endocrinologist: \par

## 2020-07-15 NOTE — DISCUSSION/SUMMARY
[FreeTextEntry1] : Patient is a very pleasant 75 year-old gentleman with no known cardiovascular disease but multiple cardiovascular risk factors as above.\par Echocardiogram and nuclear stress testing in 2018 were normal.\par Recommend ongoing risk factor modification, including management of hypertension and diabetes.\par \par Will repeat echocardiogram to evaluate structure and function of heart, including left and right sided filling pressures.\par Will repeat nuclear stress test to evaluate for ischemic heart disease.\par \par Follow-up to be determined after test results.

## 2020-07-15 NOTE — PHYSICAL EXAM
[General Appearance - Well Developed] : well developed [Normal Appearance] : normal appearance [Well Groomed] : well groomed [General Appearance - In No Acute Distress] : no acute distress [No Deformities] : no deformities [General Appearance - Well Nourished] : well nourished [Normal Oral Mucosa] : normal oral mucosa [No Oral Pallor] : no oral pallor [Normal Oropharynx] : normal oropharynx [FreeTextEntry1] : No JVD [No Oral Cyanosis] : no oral cyanosis [] : no respiratory distress [Respiration, Rhythm And Depth] : normal respiratory rhythm and effort [Auscultation Breath Sounds / Voice Sounds] : lungs were clear to auscultation bilaterally [Exaggerated Use Of Accessory Muscles For Inspiration] : no accessory muscle use [Abdomen Soft] : soft [Bowel Sounds] : normal bowel sounds [Abdomen Tenderness] : non-tender [Gait - Sufficient For Exercise Testing] : the gait was sufficient for exercise testing [Abnormal Walk] : normal gait [Cyanosis, Localized] : no localized cyanosis [Nail Clubbing] : no clubbing of the fingernails [Skin Color & Pigmentation] : normal skin color and pigmentation [No Venous Stasis] : no venous stasis [No Xanthoma] : no  xanthoma was observed [Affect] : the affect was normal [Oriented To Time, Place, And Person] : oriented to person, place, and time [Impaired Insight] : insight and judgment were intact [No Anxiety] : not feeling anxious [Mood] : the mood was normal [Conjunctiva] : the conjunctiva were normal in both eyes [PERRL] : pupils were equal in size, round, and reactive to light [EOM Intact] : extraocular movements were intact [Yellow Sclera (Icteric)] : no scleral icterus was seen [5th Left ICS - MCL] : palpated at the 5th LICS in the midclavicular line [Normal] : normal [Normal Rate] : normal [No Precordial Heave] : no precordial heave was noted [Rhythm Regular] : regular [Normal S1] : normal S1 [No Murmur] : no murmurs heard [No Gallop] : no gallop heard [Normal S2] : normal S2 [I] : a grade 1 [Left Carotid Bruit] : no bruit heard over the left carotid [Right Carotid Bruit] : no bruit heard over the right carotid [2+] : right 2+ [No Pitting Edema] : no pitting edema present

## 2020-08-10 ENCOUNTER — APPOINTMENT (OUTPATIENT)
Dept: CARDIOLOGY | Facility: CLINIC | Age: 75
End: 2020-08-10

## 2020-08-12 ENCOUNTER — APPOINTMENT (OUTPATIENT)
Dept: TRANSPLANT | Facility: CLINIC | Age: 75
End: 2020-08-12

## 2020-08-17 ENCOUNTER — APPOINTMENT (OUTPATIENT)
Dept: CARDIOLOGY | Facility: CLINIC | Age: 75
End: 2020-08-17

## 2020-09-28 ENCOUNTER — APPOINTMENT (OUTPATIENT)
Dept: CARDIOLOGY | Facility: CLINIC | Age: 75
End: 2020-09-28
Payer: COMMERCIAL

## 2020-09-28 PROCEDURE — 93306 TTE W/DOPPLER COMPLETE: CPT

## 2020-10-05 ENCOUNTER — APPOINTMENT (OUTPATIENT)
Dept: NEPHROLOGY | Facility: CLINIC | Age: 75
End: 2020-10-05
Payer: COMMERCIAL

## 2020-10-05 ENCOUNTER — LABORATORY RESULT (OUTPATIENT)
Age: 75
End: 2020-10-05

## 2020-10-05 VITALS
DIASTOLIC BLOOD PRESSURE: 83 MMHG | HEART RATE: 69 BPM | RESPIRATION RATE: 16 BRPM | HEIGHT: 66 IN | OXYGEN SATURATION: 98 % | SYSTOLIC BLOOD PRESSURE: 182 MMHG | TEMPERATURE: 97.9 F

## 2020-10-05 PROCEDURE — 99215 OFFICE O/P EST HI 40 MIN: CPT

## 2020-10-05 NOTE — PLAN
[FreeTextEntry1] : 1. ESRD  Mr. GISELA MORALES  Is a fair candidate for kidney transplantation and would benefit from transplantation \par 2.Cardiac risk: needs updated  cardiac testing \par 3. Cancer screening:  colonoscopy form 2017 , results reviewed . \par 4. ID: Serology for acute and chronic viral infections. Screening for latent TB\par 5. Imaging: needs updated imaging \par 6.Diabetes Mellitus: follows with PCP. check HbA1c \par 7.Hypertension- managed by PCP \par \par I have personally discussed the risks and benefits of transplantation and patient attended transplant education class where the following was disclosed:\par  \par Reviewed factors affecting survival and morbidity while on dialysis, the transplant wait list and reviewed marie-operative and long-term risk factors affecting outcome in kidney transplantation. \par  \par One year SRTR outcomes for national and [facility] were discussed in regards to patient survival and graft survival after transplantation. \par  \par Details of transplant surgery, including complications were discussed.\par Immunosuppression and complications including infection including life threatening sepsis and opportunistic infections, malignancy and new onset diabetes were discussed. \par  \par Benefits of live donor transplantation as well as variability in wait times across regions and multiple listing were discussed. \par KDPI >85% and PHS high risk criteria donors were discussed. \par HCV kidney transplantation was discussed.\par  \par Will proceed with completing/ updating work up and listing for transplant/ live donor transplant once work up is reviewed and found to be acceptable by multidisciplinary listing committee.\par

## 2020-10-05 NOTE — HISTORY OF PRESENT ILLNESS
[TextBox_42] : 75 years old [place] man, living in  since 1970. Patient has known CKD (), ESRD (2018) on follow up with / Dr. Olivares is here for a f/u for  kidney transplant evaluation. \par He has known DM (age 57) not on insulin; HTN (age 57).\par No known h/o kidney stone/ Prostatism.\par No hematuria/Nocturia/Transfusions\par Urine out put: 1-2 cups/day\par Has no h/o UTI. Pneumonia 25 years ago, was not hospitalized\par No known h/o active CAD/CVA/PVD/DVT/neoplasia/active infections/bleeding.\par No illness or hospitalization in the last 6 months. \par Past surgeries:\par Appendectomy, at age 20; Left forearm AVG, Dr. Retana and Dr. Montes De Oca.\par No history of kidney/ bladder/ prostate surgery.\par Non smoker. Quit 40 years ago, 1/2 ppd for 5 years.\par Fam: Parents are  . Father - CAD Mother- stomach cancer Siblings-brother had stomach cancer.\par Children: 43 years old son,  , [employer] graduate , lives in [place]. Healthy. \par Has family history of kidney disease- sister is on dialysis, not diabetic, lives in [place]. She is on dialysis after losing transplant after 14 years (from daughter).(Padmini Waters, transplanted at [place])\par \par Functional status - Independent for ADL, Able to walk 3-4  blocks and can climb stairs without difficulty.\par ROS: Has no h/o shortness of breath on exertion.\par Functional/employment status: Retired, used to work in [employer] in [place], as a  for dresses and also as building . 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 Blood pressure normal at dialysis. \par Kidney Biopsy:none\par Potential Live donors: son- Rubén Waters\par \par Last Seen 18\par Next Apt TBD\par Listed 18\par ABO O\par PRA 0% 2018\par Cause of Kidney failure DM\par Other Med Hx ESRD 2018 ( CKD 2013), BPH, T2DM age 57, Steal Syndrome, HTN age 57, PNA, HLD	\par \par Most recent testing\par Dr. Dumas on 18\par EK2018, normal, sinus at 70 bpm \par Nuclear stress test : 10/1/18 – good quality. LV normal, There is a small, mild defect in mid to distal inferolateral wall that is fixed, suggestive of diaphragmatic artifact, no longer significant with prone imaging . Normal study \par Echo – 10/1/2018 LVEF 63% calcified AV with normal opening. Normal LA,LV, and RV , diastolic LV dysfxn, normal pulmonary pressure. . Thickened pericardium with no pericardial effussion \par \par Radiology\par CXR – 2018 clear lungs \par US Abd/doppler – 18 Liver,bile ducts, GB, pancreas, spleen are WNL. Right kidney: 10.2 cm. No hydronephrosis. 1.5 cm lower pole cyst.Left kidney: 10 cm. No hydronephrosis The aorta, common and external iliac arteries are patent without evidence to suggest a significant stenosis. The IVC and iliac veins are patent demonstrating phasic waveforms. There is no aortic aneurysm. The common and external iliac arteries are normal in caliber\par \par Cancer Screening\par Colonoscopy – 17 internal hemorrhoids \par PSA: 18 - 0.91\par \par

## 2020-10-05 NOTE — PHYSICAL EXAM
[General Appearance - Alert] : alert [General Appearance - In No Acute Distress] : in no acute distress [Sclera] : the sclera and conjunctiva were normal [PERRL With Normal Accommodation] : pupils were equal in size, round, and reactive to light [Extraocular Movements] : extraocular movements were intact [Outer Ear] : the ears and nose were normal in appearance [Oropharynx] : the oropharynx was normal [Neck Appearance] : the appearance of the neck was normal [Neck Cervical Mass (___cm)] : no neck mass was observed [Jugular Venous Distention Increased] : there was no jugular-venous distention [Thyroid Diffuse Enlargement] : the thyroid was not enlarged [Thyroid Nodule] : there were no palpable thyroid nodules [Auscultation Breath Sounds / Voice Sounds] : lungs were clear to auscultation bilaterally [Heart Rate And Rhythm] : heart rate was normal and rhythm regular [Heart Sounds] : normal S1 and S2 [Heart Sounds Gallop] : no gallops [Murmurs] : no murmurs [Heart Sounds Pericardial Friction Rub] : no pericardial rub [Full Pulse] : the pedal pulses are present [Edema] : there was no peripheral edema [Bowel Sounds] : normal bowel sounds [Abdomen Soft] : soft [Abdomen Tenderness] : non-tender [Abdomen Mass (___ Cm)] : no abdominal mass palpated [No CVA Tenderness] : no ~M costovertebral angle tenderness [No Spinal Tenderness] : no spinal tenderness [Abnormal Walk] : normal gait [Nail Clubbing] : no clubbing  or cyanosis of the fingernails [Musculoskeletal - Swelling] : no joint swelling seen [Motor Tone] : muscle strength and tone were normal [Skin Color & Pigmentation] : normal skin color and pigmentation [Skin Turgor] : normal skin turgor [Normal] : normal [] : right dorsalis pedis palpable

## 2020-10-13 LAB
ABO + RH PNL BLD: NORMAL
ALBUMIN SERPL ELPH-MCNC: 4.8 G/DL
ALP BLD-CCNC: 89 U/L
ALT SERPL-CCNC: 29 U/L
ANION GAP SERPL CALC-SCNC: 19 MMOL/L
APPEARANCE: CLEAR
AST SERPL-CCNC: 22 U/L
BACTERIA: NEGATIVE
BASOPHILS # BLD AUTO: 0.06 K/UL
BASOPHILS NFR BLD AUTO: 0.7 %
BILIRUB SERPL-MCNC: 0.5 MG/DL
BILIRUBIN URINE: NEGATIVE
BLOOD URINE: NORMAL
BUN SERPL-MCNC: 72 MG/DL
C PEPTIDE SERPL-MCNC: 12.5 NG/ML
CALCIUM SERPL-MCNC: 9.4 MG/DL
CHLORIDE SERPL-SCNC: 99 MMOL/L
CHOLEST SERPL-MCNC: 243 MG/DL
CHOLEST/HDLC SERPL: 8.4 RATIO
CMV IGG SERPL QL: 2.8 U/ML
CMV IGG SERPL-IMP: POSITIVE
CO2 SERPL-SCNC: 24 MMOL/L
COLOR: NORMAL
CREAT SERPL-MCNC: 10.75 MG/DL
CREAT SPEC-SCNC: 73 MG/DL
CREAT/PROT UR: 2.4 RATIO
EBV EA AB SER IA-ACNC: <5 U/ML
EBV EA AB TITR SER IF: POSITIVE
EBV EA IGG SER QL IA: >600 U/ML
EBV EA IGG SER-ACNC: NEGATIVE
EBV EA IGM SER IA-ACNC: NEGATIVE
EBV PATRN SPEC IB-IMP: NORMAL
EBV VCA IGG SER IA-ACNC: >750 U/ML
EBV VCA IGM SER QL IA: <10 U/ML
EOSINOPHIL # BLD AUTO: 0.25 K/UL
EOSINOPHIL NFR BLD AUTO: 3.1 %
EPSTEIN-BARR VIRUS CAPSID ANTIGEN IGG: POSITIVE
ESTIMATED AVERAGE GLUCOSE: 146 MG/DL
GLUCOSE QUALITATIVE U: NORMAL
GLUCOSE SERPL-MCNC: 140 MG/DL
HBA1C MFR BLD HPLC: 6.7 %
HBV CORE IGG+IGM SER QL: NONREACTIVE
HBV SURFACE AB SER QL: REACTIVE
HBV SURFACE AB SERPL IA-ACNC: 131 MIU/ML
HBV SURFACE AG SER QL: NONREACTIVE
HCT VFR BLD CALC: 40 %
HCV AB SER QL: NONREACTIVE
HCV S/CO RATIO: 0.06 S/CO
HDLC SERPL-MCNC: 29 MG/DL
HEPATITIS A IGG ANTIBODY: REACTIVE
HGB BLD-MCNC: 12.4 G/DL
HIV1+2 AB SPEC QL IA.RAPID: NONREACTIVE
HSV 1+2 IGG SER IA-IMP: NEGATIVE
HSV 1+2 IGG SER IA-IMP: POSITIVE
HSV1 IGG SER QL: 35.9 INDEX
HSV2 IGG SER QL: 0.12 INDEX
HYALINE CASTS: 1 /LPF
IMM GRANULOCYTES NFR BLD AUTO: 0.4 %
KETONES URINE: NEGATIVE
LDLC SERPL CALC-MCNC: 163 MG/DL
LEUKOCYTE ESTERASE URINE: ABNORMAL
LYMPHOCYTES # BLD AUTO: 1.35 K/UL
LYMPHOCYTES NFR BLD AUTO: 16.9 %
M TB IFN-G BLD-IMP: NEGATIVE
MAGNESIUM SERPL-MCNC: 2.5 MG/DL
MAN DIFF?: NORMAL
MCHC RBC-ENTMCNC: 31 GM/DL
MCHC RBC-ENTMCNC: 33.2 PG
MCV RBC AUTO: 107 FL
MICROSCOPIC-UA: NORMAL
MONOCYTES # BLD AUTO: 0.57 K/UL
MONOCYTES NFR BLD AUTO: 7.1 %
NEUTROPHILS # BLD AUTO: 5.75 K/UL
NEUTROPHILS NFR BLD AUTO: 71.8 %
NITRITE URINE: NEGATIVE
PH URINE: 6.5
PHOSPHATE SERPL-MCNC: 5.1 MG/DL
PLATELET # BLD AUTO: 226 K/UL
POTASSIUM SERPL-SCNC: 5.6 MMOL/L
PROT SERPL-MCNC: 7.6 G/DL
PROT UR-MCNC: 177 MG/DL
PROTEIN URINE: ABNORMAL
PSA SERPL-MCNC: 1.14 NG/ML
QUANTIFERON TB PLUS MITOGEN MINUS NIL: 4.16 IU/ML
QUANTIFERON TB PLUS NIL: 0.01 IU/ML
QUANTIFERON TB PLUS TB1 MINUS NIL: 0 IU/ML
QUANTIFERON TB PLUS TB2 MINUS NIL: 0 IU/ML
RBC # BLD: 3.74 M/UL
RBC # FLD: 15.3 %
RED BLOOD CELLS URINE: 4 /HPF
RUBV IGG FLD-ACNC: 20.6 INDEX
RUBV IGG SER-IMP: POSITIVE
SARS-COV-2 IGG SERPL IA-ACNC: 0.02 INDEX
SARS-COV-2 IGG SERPL QL IA: NEGATIVE
SODIUM SERPL-SCNC: 141 MMOL/L
SPECIFIC GRAVITY URINE: 1.01
SQUAMOUS EPITHELIAL CELLS: 1 /HPF
T GONDII AB SER-IMP: POSITIVE
T GONDII IGG SER QL: 47.3 IU/ML
T PALLIDUM AB SER QL IA: NEGATIVE
TRIGL SERPL-MCNC: 254 MG/DL
URATE SERPL-MCNC: 8.3 MG/DL
UROBILINOGEN URINE: NORMAL
VZV AB TITR SER: POSITIVE
VZV IGG SER IF-ACNC: 3205 INDEX
WBC # FLD AUTO: 8.01 K/UL
WHITE BLOOD CELLS URINE: 8 /HPF

## 2020-10-19 ENCOUNTER — APPOINTMENT (OUTPATIENT)
Dept: CARDIOLOGY | Facility: CLINIC | Age: 75
End: 2020-10-19
Payer: COMMERCIAL

## 2020-10-19 PROCEDURE — 93015 CV STRESS TEST SUPVJ I&R: CPT

## 2020-10-19 PROCEDURE — 78452 HT MUSCLE IMAGE SPECT MULT: CPT

## 2020-10-19 PROCEDURE — 99072 ADDL SUPL MATRL&STAF TM PHE: CPT

## 2020-10-19 PROCEDURE — A9500: CPT

## 2020-11-06 ENCOUNTER — APPOINTMENT (OUTPATIENT)
Dept: DISASTER EMERGENCY | Facility: CLINIC | Age: 75
End: 2020-11-06

## 2020-11-08 DIAGNOSIS — Z01.818 ENCOUNTER FOR OTHER PREPROCEDURAL EXAMINATION: ICD-10-CM

## 2020-11-09 ENCOUNTER — APPOINTMENT (OUTPATIENT)
Dept: DISASTER EMERGENCY | Facility: CLINIC | Age: 75
End: 2020-11-09

## 2020-11-11 LAB — SARS-COV-2 N GENE NPH QL NAA+PROBE: NOT DETECTED

## 2020-11-12 ENCOUNTER — INPATIENT (INPATIENT)
Facility: HOSPITAL | Age: 75
LOS: 0 days | Discharge: HOME | DRG: 951 | End: 2020-11-12
Attending: INTERNAL MEDICINE | Admitting: INTERNAL MEDICINE
Payer: COMMERCIAL

## 2020-11-12 VITALS
RESPIRATION RATE: 18 BRPM | HEIGHT: 66 IN | HEART RATE: 84 BPM | TEMPERATURE: 98 F | WEIGHT: 184.09 LBS | OXYGEN SATURATION: 98 %

## 2020-11-12 VITALS
HEART RATE: 67 BPM | SYSTOLIC BLOOD PRESSURE: 136 MMHG | RESPIRATION RATE: 16 BRPM | TEMPERATURE: 98 F | OXYGEN SATURATION: 100 % | DIASTOLIC BLOOD PRESSURE: 98 MMHG

## 2020-11-12 DIAGNOSIS — Z90.49 ACQUIRED ABSENCE OF OTHER SPECIFIED PARTS OF DIGESTIVE TRACT: Chronic | ICD-10-CM

## 2020-11-12 DIAGNOSIS — Z01.818 ENCOUNTER FOR OTHER PREPROCEDURAL EXAMINATION: ICD-10-CM

## 2020-11-12 DIAGNOSIS — N18.6 END STAGE RENAL DISEASE: ICD-10-CM

## 2020-11-12 DIAGNOSIS — R94.39 ABNORMAL RESULT OF OTHER CARDIOVASCULAR FUNCTION STUDY: ICD-10-CM

## 2020-11-12 LAB
ANION GAP SERPL CALC-SCNC: 18 MMOL/L — HIGH (ref 5–17)
BUN SERPL-MCNC: 77 MG/DL — HIGH (ref 7–23)
CALCIUM SERPL-MCNC: 9.2 MG/DL — SIGNIFICANT CHANGE UP (ref 8.4–10.5)
CHLORIDE SERPL-SCNC: 98 MMOL/L — SIGNIFICANT CHANGE UP (ref 96–108)
CO2 SERPL-SCNC: 23 MMOL/L — SIGNIFICANT CHANGE UP (ref 22–31)
CREAT SERPL-MCNC: 9.62 MG/DL — HIGH (ref 0.5–1.3)
GLUCOSE BLDC GLUCOMTR-MCNC: 163 MG/DL — HIGH (ref 70–99)
GLUCOSE BLDC GLUCOMTR-MCNC: 172 MG/DL — HIGH (ref 70–99)
GLUCOSE BLDC GLUCOMTR-MCNC: 185 MG/DL — HIGH (ref 70–99)
GLUCOSE BLDC GLUCOMTR-MCNC: 229 MG/DL — HIGH (ref 70–99)
GLUCOSE SERPL-MCNC: 161 MG/DL — HIGH (ref 70–99)
HCT VFR BLD CALC: 33 % — LOW (ref 39–50)
HGB BLD-MCNC: 10.8 G/DL — LOW (ref 13–17)
MCHC RBC-ENTMCNC: 32.7 GM/DL — SIGNIFICANT CHANGE UP (ref 32–36)
MCHC RBC-ENTMCNC: 33.4 PG — SIGNIFICANT CHANGE UP (ref 27–34)
MCV RBC AUTO: 102.2 FL — HIGH (ref 80–100)
NRBC # BLD: 0 /100 WBCS — SIGNIFICANT CHANGE UP (ref 0–0)
PLATELET # BLD AUTO: 159 K/UL — SIGNIFICANT CHANGE UP (ref 150–400)
POTASSIUM SERPL-MCNC: 5.3 MMOL/L — SIGNIFICANT CHANGE UP (ref 3.5–5.3)
POTASSIUM SERPL-SCNC: 5.3 MMOL/L — SIGNIFICANT CHANGE UP (ref 3.5–5.3)
RBC # BLD: 3.23 M/UL — LOW (ref 4.2–5.8)
RBC # FLD: 14.2 % — SIGNIFICANT CHANGE UP (ref 10.3–14.5)
SODIUM SERPL-SCNC: 139 MMOL/L — SIGNIFICANT CHANGE UP (ref 135–145)
WBC # BLD: 8.5 K/UL — SIGNIFICANT CHANGE UP (ref 3.8–10.5)
WBC # FLD AUTO: 8.5 K/UL — SIGNIFICANT CHANGE UP (ref 3.8–10.5)

## 2020-11-12 PROCEDURE — 93458 L HRT ARTERY/VENTRICLE ANGIO: CPT | Mod: 26

## 2020-11-12 PROCEDURE — 99152 MOD SED SAME PHYS/QHP 5/>YRS: CPT

## 2020-11-12 PROCEDURE — 99261: CPT

## 2020-11-12 PROCEDURE — C1769: CPT

## 2020-11-12 PROCEDURE — 82962 GLUCOSE BLOOD TEST: CPT

## 2020-11-12 PROCEDURE — 99153 MOD SED SAME PHYS/QHP EA: CPT

## 2020-11-12 PROCEDURE — C1887: CPT

## 2020-11-12 PROCEDURE — C1894: CPT

## 2020-11-12 PROCEDURE — 93458 L HRT ARTERY/VENTRICLE ANGIO: CPT

## 2020-11-12 PROCEDURE — 80048 BASIC METABOLIC PNL TOTAL CA: CPT

## 2020-11-12 PROCEDURE — 93010 ELECTROCARDIOGRAM REPORT: CPT

## 2020-11-12 PROCEDURE — 93005 ELECTROCARDIOGRAM TRACING: CPT

## 2020-11-12 PROCEDURE — 85027 COMPLETE CBC AUTOMATED: CPT

## 2020-11-12 RX ORDER — TAMSULOSIN HYDROCHLORIDE 0.4 MG/1
1 CAPSULE ORAL
Qty: 0 | Refills: 0 | DISCHARGE

## 2020-11-12 RX ORDER — INSULIN LISPRO 100/ML
VIAL (ML) SUBCUTANEOUS
Refills: 0 | Status: DISCONTINUED | OUTPATIENT
Start: 2020-11-12 | End: 2020-11-12

## 2020-11-12 RX ORDER — LISINOPRIL 2.5 MG/1
1 TABLET ORAL
Qty: 0 | Refills: 0 | DISCHARGE

## 2020-11-12 RX ORDER — CARVEDILOL PHOSPHATE 80 MG/1
12.5 CAPSULE, EXTENDED RELEASE ORAL EVERY 12 HOURS
Refills: 0 | Status: DISCONTINUED | OUTPATIENT
Start: 2020-11-12 | End: 2020-11-12

## 2020-11-12 RX ORDER — HYDRALAZINE HCL 50 MG
1 TABLET ORAL
Qty: 0 | Refills: 0 | DISCHARGE

## 2020-11-12 RX ORDER — DEXTROSE 50 % IN WATER 50 %
25 SYRINGE (ML) INTRAVENOUS ONCE
Refills: 0 | Status: DISCONTINUED | OUTPATIENT
Start: 2020-11-12 | End: 2020-11-12

## 2020-11-12 RX ORDER — FUROSEMIDE 40 MG
1 TABLET ORAL
Qty: 0 | Refills: 0 | DISCHARGE

## 2020-11-12 RX ORDER — ASCORBIC ACID 60 MG
1 TABLET,CHEWABLE ORAL
Qty: 0 | Refills: 0 | DISCHARGE

## 2020-11-12 RX ORDER — GLUCAGON INJECTION, SOLUTION 0.5 MG/.1ML
1 INJECTION, SOLUTION SUBCUTANEOUS ONCE
Refills: 0 | Status: DISCONTINUED | OUTPATIENT
Start: 2020-11-12 | End: 2020-11-12

## 2020-11-12 RX ORDER — CARVEDILOL PHOSPHATE 80 MG/1
1 CAPSULE, EXTENDED RELEASE ORAL
Qty: 0 | Refills: 0 | DISCHARGE

## 2020-11-12 RX ORDER — DEXTROSE 50 % IN WATER 50 %
15 SYRINGE (ML) INTRAVENOUS ONCE
Refills: 0 | Status: DISCONTINUED | OUTPATIENT
Start: 2020-11-12 | End: 2020-11-12

## 2020-11-12 RX ORDER — SITAGLIPTIN 50 MG/1
1 TABLET, FILM COATED ORAL
Qty: 0 | Refills: 0 | DISCHARGE

## 2020-11-12 RX ORDER — FERRIC CITRATE 210 MG/1
1 TABLET, COATED ORAL
Qty: 0 | Refills: 0 | DISCHARGE

## 2020-11-12 RX ORDER — INSULIN LISPRO 100/ML
VIAL (ML) SUBCUTANEOUS AT BEDTIME
Refills: 0 | Status: DISCONTINUED | OUTPATIENT
Start: 2020-11-12 | End: 2020-11-12

## 2020-11-12 RX ORDER — SODIUM CHLORIDE 9 MG/ML
3 INJECTION INTRAMUSCULAR; INTRAVENOUS; SUBCUTANEOUS EVERY 8 HOURS
Refills: 0 | Status: DISCONTINUED | OUTPATIENT
Start: 2020-11-12 | End: 2020-11-12

## 2020-11-12 RX ORDER — DEXTROSE 50 % IN WATER 50 %
12.5 SYRINGE (ML) INTRAVENOUS ONCE
Refills: 0 | Status: DISCONTINUED | OUTPATIENT
Start: 2020-11-12 | End: 2020-11-12

## 2020-11-12 RX ORDER — CALCITRIOL 0.5 UG/1
1 CAPSULE ORAL
Qty: 0 | Refills: 0 | DISCHARGE

## 2020-11-12 RX ORDER — CANAGLIFLOZIN 100 MG/1
1 TABLET, FILM COATED ORAL
Qty: 0 | Refills: 0 | DISCHARGE

## 2020-11-12 RX ORDER — ASPIRIN/CALCIUM CARB/MAGNESIUM 324 MG
1 TABLET ORAL
Qty: 0 | Refills: 0 | DISCHARGE

## 2020-11-12 RX ORDER — SODIUM CHLORIDE 9 MG/ML
1000 INJECTION, SOLUTION INTRAVENOUS
Refills: 0 | Status: DISCONTINUED | OUTPATIENT
Start: 2020-11-12 | End: 2020-11-12

## 2020-11-12 RX ORDER — FERROUS SULFATE 325(65) MG
1 TABLET ORAL
Qty: 0 | Refills: 0 | DISCHARGE

## 2020-11-12 RX ORDER — SIMVASTATIN 20 MG/1
1 TABLET, FILM COATED ORAL
Qty: 0 | Refills: 0 | DISCHARGE

## 2020-11-12 RX ORDER — LINAGLIPTIN 5 MG/1
1 TABLET, FILM COATED ORAL
Qty: 0 | Refills: 0 | DISCHARGE

## 2020-11-12 RX ORDER — OMEPRAZOLE 10 MG/1
1 CAPSULE, DELAYED RELEASE ORAL
Qty: 0 | Refills: 0 | DISCHARGE

## 2020-11-12 RX ORDER — ALLOPURINOL 300 MG
1 TABLET ORAL
Qty: 0 | Refills: 0 | DISCHARGE

## 2020-11-12 RX ADMIN — Medication 2: at 11:08

## 2020-11-12 RX ADMIN — Medication 1: at 17:19

## 2020-11-12 RX ADMIN — CARVEDILOL PHOSPHATE 12.5 MILLIGRAM(S): 80 CAPSULE, EXTENDED RELEASE ORAL at 09:24

## 2020-11-12 NOTE — ASU DISCHARGE PLAN (ADULT/PEDIATRIC) - CARE PROVIDER_API CALL
Brian Dumas  CARDIOLOGY  [address], [address]  [place] [address]  Phone: [phone]  Fax: [phone]  Established Patient  Follow Up Time: 2 weeks

## 2020-11-12 NOTE — CONSULT NOTE ADULT - SUBJECTIVE AND OBJECTIVE BOX
[facility] - MERCEDES Alejandro / MERCEDES Mcarthur / LIMA Mendoza/ MERCEDES Rai/ MERCEDES Olivares/ GAYATHRI Ross / FILOMENA Baxter / LINDA Humphreys  -------------------------------------------------------------------------------------------------------  The patient seen and examined today.  HPI:  76 y/o  male with PMH of hypertension, hyperlipidemia, GERD, T2DM/IDDM and ESRD on HD on T/T/S via Left arm AV fistula, now presents for cardiac evaluation prior to possible Renal transplant. Seen & evaluated by Dr Dumas & now recommneds for cardiac cath.  Receives HD TTS at [facility] via LUE AVF      PAST MEDICAL & SURGICAL HISTORY:  Enlarged prostate    Hyperlipidemia, unspecified hyperlipidemia type    Chronic gout of multiple sites, unspecified cause    Head injury with loss of consciousness  2014    Stage 4 chronic kidney disease    ESRD (end stage renal disease)    GERD without esophagitis    Essential (primary) hypertension    Diabetes mellitus, type 2    History of appendectomy      Allergies :- No Known Allergies    Home Medications Reviewed  Hospital Medications:   MEDICATIONS  (STANDING):  carvedilol 12.5 milliGRAM(s) Oral every 12 hours  dextrose 40% Gel 15 Gram(s) Oral once  dextrose 5%. 1000 milliLiter(s) (50 mL/Hr) IV Continuous <Continuous>  dextrose 5%. 1000 milliLiter(s) (100 mL/Hr) IV Continuous <Continuous>  dextrose 50% Injectable 25 Gram(s) IV Push once  dextrose 50% Injectable 12.5 Gram(s) IV Push once  dextrose 50% Injectable 25 Gram(s) IV Push once  glucagon  Injectable 1 milliGRAM(s) IntraMuscular once  insulin lispro (ADMELOG) corrective regimen sliding scale   SubCutaneous three times a day before meals  insulin lispro (ADMELOG) corrective regimen sliding scale   SubCutaneous at bedtime  sodium chloride 0.9% lock flush 3 milliLiter(s) IV Push every 8 hours    SOCIAL HISTORY:  Denies ETOh,Smoking,   FAMILY HISTORY:  Diabetes mellitus, type 2 (Sibling)    Family history of heart attack (Father)    Family history of essential hypertension (Father)    Family history of stomach cancer (Mother, Sibling)        REVIEW OF SYSTEMS:  CONSTITUTIONAL: No weakness, fevers or chills  EYES/ENT: No visual changes;  No vertigo or throat pain   NECK: No pain or stiffness  RESPIRATORY: No cough, wheezing, hemoptysis; No shortness of breath  CARDIOVASCULAR: No chest pain or palpitations.  GASTROINTESTINAL: No abdominal or epigastric pain. No nausea, vomiting, or hematemesis; No diarrhea or constipation. No melena or hematochezia.  GENITOURINARY: No dysuria, frequency, foamy urine, urinary urgency, incontinence or hematuria  NEUROLOGICAL: No numbness or weakness  SKIN: No itching, burning, rashes, or lesions   VASCULAR: No bilateral lower extremity edema.   All other review of systems is negative unless indicated above.    VITALS:  T(F): 99.1 (11-12-20 @ 09:45), Max: 99.1 (11-12-20 @ 09:45)  HR: 68 (11-12-20 @ 11:00)  BP: 147/65 (11-12-20 @ 11:00)  RR: 16 (11-12-20 @ 11:00)  SpO2: 98% (11-12-20 @ 11:00)  Wt(kg): --    Height (cm): 167.6 (11-12 @ 06:35)  Weight (kg): 82.6 (11-12 @ 06:35)  BMI (kg/m2): 29.4 (11-12 @ 06:35)  BSA (m2): 1.92 (11-12 @ 06:35)    PHYSICAL EXAM:  Constitutional: NAD  HEENT: anicteric sclera, oropharynx clear, MMM  Neck: supple.   Respiratory: Bilateral equal breath sounds , no wheezes, no crackles  Cardiovascular: S1, S2, Regular, Murmur present.  Gastrointestinal: Bowel Sound present, soft, NT/ND  Extremities: No cyanosis or clubbing. No peripheral edema  Neurological: Alert and oriented x 3, no focal deficits  Psychiatric: Normal mood, normal affect  : No CVA tenderness. No rubin.   Skin: No rashes    Data:  11-12    139  |  98  |  77<H>  ----------------------------<  161<H>  5.3   |  23  |  9.62<H>    Ca    9.2      12 Nov 2020 06:57      Creatinine Trend: 9.62 <--                        10.8   8.50  )-----------( 159      ( 12 Nov 2020 06:57 )             33.0     Urine Studies:

## 2020-11-12 NOTE — CONSULT NOTE ADULT - ATTENDING COMMENTS
Thank you for allowing me to participate in the care of your patient    For any question, call:  Cell # [phone]  Pager # [phone]  Callback # [phone]

## 2020-11-12 NOTE — CHART NOTE - NSCHARTNOTEFT_GEN_A_CORE
s/p diagnostic LHC via RRB. Patient went for dialysis at 1300 and returned. VSS and site with no signs of hematoma, bleeding, pain or swelling to site.  Patient to be D/C home today and follow up with cardiologist in 1-2 weeks.

## 2020-11-12 NOTE — ASU DISCHARGE PLAN (ADULT/PEDIATRIC) - ASU DC SPECIAL INSTRUCTIONSFT
Wound Care:   the day AFTER your procedure remove bandage GENTTLY, and clean using  mild soap and gentle warm, water stream, pat dry. leave OPEN to air. YOU MAY SHOWER   DO NOT apply lotions, creams, ointments, powder, parfumes to your incision site  DO NOT SOAK your site for 1 week ( no baths, no pools, no tubs, etc...)  Check  your groin and /or wirst daily.A small amount of bruising, and soarness are normal    ACTIVITY: for 24 hours   - DO NOT DRIVE  - DO NOT make any important decisions or sign legal documents   - DO NOT operate heavy machinaries   - you may resume sexual activity in 48 hours, unless otherwise instructed by your cardiologist     If your procedure was done through the WRIST: for the NEXT 3DAYS:  - avoid pushing, pulling, with that affected wrist   - avoid repeated movement of that hand and wrist ( eg: typing, hammering)  - DO NOT LIFT anything more than 5 lbs     If your procedure was done through the GROIN: for the NEXT 5 DAYS  - Limit climbing stairs, DO NOT soak in bathtub or pool  - no strenous activities, pushing, pulling, straining  - Do not lift anything 10lbs or heavier     MEDICATION:   take your medications as explained ( see discharge paperwork)   If you received a STENT, you will be taking antiplatelet medications to KEEP YOUR STENT OPEN ( eg: Aspirin, Plavix, Brilinta, Effient, etc).  Take as prescribed DO NOT STOP taking them without consulting with your cardiologist first.     Follow heart healthy diet reccomended by your doctor, , if you smoke STOP SMOKING ( may call [phone] for center of tobacco control if you need assistance)     CALL your doctor to make appointment in 2 WEEKS     ***CALL YOUR DOCTOR***  if you experience: fever, chills, body aches, or severe pain, swelling, redness, heat or yellow discharge at incision site  If you experience Bleeding or excruciating pain at the procedural site, sweliing ( golf ball size) at your procedural site  If you experience CHEST PAIN  If you experience extremity numbness, tingling, temperature change ( of your procedural site)   If you are unable to reach your doctor, you may contact:   -Cardiology Office at [facility] at [phone] or   - [facility] [phone] - [facility] [phone]

## 2020-11-12 NOTE — H&P CARDIOLOGY - HISTORY OF PRESENT ILLNESS
76 y/o  male with PMH of hypertension, hyperlipidemia, GERD, T2DM/IDDM and ESRD on HD on T/T/S via Left arm AV fistula, now presents for cardiac evaluation prior to possible Renal transplant. Seen & evaluated by Dr Dumas & now recommneds for cardiac cath.  PMD: Isabel Martinez MD [phone]  Nephrologist: Kodi Olivares MD [phone]

## 2020-11-12 NOTE — CONSULT NOTE ADULT - ASSESSMENT
74 y/o  male with PMH of hypertension, hyperlipidemia, GERD, T2DM/IDDM and Receives [facility] T[facility] at [facility] presents for cardiac evaluation prior to possible Renal transplant.

## 2020-11-12 NOTE — H&P CARDIOLOGY - WEIGHT IN KG
Aidan Hinton MD  Cardiology Fellow  [phone]  All Cardiology service information can be found 24/7 on [email or web address], password: [email or web address]    Patient seen and examined at bedside.    Overnight Events: Nausea and vomiting this morning. Otherwise has no complaints and states has felt well since procedure before his GI symptoms developed this morning.    REVIEW OF SYSTEMS:  General: no fatigue/malaise, weight loss/gain.  Skin: no rashes.  Ophthalmologic: no blurred vision, no loss of vision. 	  ENT: no sore throat, rhinorrhea, sinus congestion.  Respiratory: no SOB, cough or wheeze.  CV: No chest pain. No palpitations.   Gastrointestinal:  Nausea and vomiting  Genitourinary: no dysuria/hesitancy or hematuria.  Musculoskeletal: no myalgias or arthralgias.  Neurological: no changes in vision or hearing, no lightheadedness/dizziness, no syncope/near syncope	  Psychiatric: no unusual stress/anxiety.   Hematology/Lymphatics: no unusual bleeding, bruising and no lymphadenopathy.  Endocrine: no unusual thirst.        Current Meds:  allopurinol 100 milliGRAM(s) Oral daily  aspirin enteric coated 81 milliGRAM(s) Oral daily  atorvastatin 40 milliGRAM(s) Oral at bedtime  bisacodyl Suppository 10 milliGRAM(s) Rectal daily PRN  buDESOnide  80 MICROgram(s)/formoterol 4.5 MICROgram(s) Inhaler 2 Puff(s) Inhalation two times a day  chlorhexidine 2% Cloths 1 Application(s) Topical <User Schedule>  DOBUTamine Infusion 1.5 MICROgram(s)/kG/Min IV Continuous <Continuous>  ipratropium 17 MICROgram(s) HFA Inhaler 1 Puff(s) Inhalation every 6 hours  metoclopramide Injectable 5 milliGRAM(s) IV Push every 8 hours  pantoprazole  Injectable 40 milliGRAM(s) IV Push two times a day  rivaroxaban 15 milliGRAM(s) Oral daily  sodium chloride 0.9%. 1000 milliLiter(s) IV Continuous <Continuous>      PAST MEDICAL & SURGICAL HISTORY:  MR (mitral regurgitation)  Stented coronary artery  Sleep apnea  PAD (peripheral artery disease)  Gout  Hyperlipidemia, unspecified hyperlipidemia type  Essential hypertension  Coronary artery disease  Ankle fracture: s/p ORIF  History of appendectomy  H/O hernia repair      Vitals:  T(F): 98 (09-06), Max: 98.4 (09-05)  HR: 101 (09-06) (85 - 117)  BP: 115/76 (09-06) (104/64 - 134/68)  RR: 23 (09-06)  SpO2: 98% (09-06)  I&O's Summary    05 Sep 2019 07:01  -  06 Sep 2019 07:00  --------------------------------------------------------  IN: 1288.3 mL / OUT: 1200 mL / NET: 88.3 mL        Physical Exam:  Appearance: No acute distress  Eyes: PERRL, EOMI, pink conjunctiva  HENT: Normal oral mucosa  Cardiovascular: RRR, S1, S2, no murmurs, rubs, or gallops; no edema; no JVD  Respiratory: Clear to auscultation bilaterally  Gastrointestinal: soft, non-tender, non-distended with normal bowel sounds  Musculoskeletal: No clubbing; no joint deformity   Neurologic: Non-focal  Lymphatic: No lymphadenopathy  Psychiatry: AAOx3, mood & affect appropriate  Skin: No rashes, ecchymoses, or cyanosis                          13.1   5.9   )-----------( 176      ( 06 Sep 2019 12:06 )             41.1     09-06    140  |  102  |  25<H>  ----------------------------<  135<H>  4.4   |  25  |  1.48<H>    Ca    9.8      06 Sep 2019 06:20  Phos  3.7     09-05  Mg     2.4     09-05    TPro  8.2  /  Alb  3.9  /  TBili  0.8  /  DBili  0.2  /  AST  32  /  ALT  22  /  AlkPhos  117  09-06    PT/INR - ( 05 Sep 2019 02:03 )   PT: 12.8 sec;   INR: 1.11 ratio         PTT - ( 05 Sep 2019 02:03 )  PTT:29.6 sec              New ECG(s): Personally reviewed    Echo: < from: Intra-Operative Transesopha Echo (w/3D) (09.04.19 @ 09:34) >  Dimensions:    Normal Values:  LA:            2.0 - 4.0 cm  Ao:     3.8    2.0 - 3.8 cm  SEPTUM:       0.6 - 1.2 cm  PWT:           0.6 - 1.1 cm  LVIDd:         3.0 - 5.6 cm  LVIDs:         1.8 - 4.0 cm  EF (Visual Estimate): <20% %  ------------------------------------------------------------------------  Pre-Bypass Observations:  Mitral Valve: The mitral valve leaflets are  tethered/restricted. There is at least moderate-severe(3+)  mitral regurgitation.  The etiology is functional.   The  peak/mean transmitral gradients= 4/1mmHg (HR= 92bpm).  There is no mitral stenosis.  Aortic Valve/Aorta: Trileaflet aortic valve. There is no  aortic stenosis. No aortic valve regurgitation seen.  Aortic Root: 3.8 cm.  The aortic root is normal in size.  Left Atrium: There is no thrombus in the left atrium.  Left Ventricle:  The LVEF <20%. There is global hypokinesis  with minor regional variation. The left ventricle is  moderately dilated.  Right Heart: There is no thrombus in the right atrium. The  right ventricle is moderately dilated with moderately  reduced function. The tricuspid annulus is dilated.  After  medical optimization and under anesthesia, the tricuspid  regurgitation appeared mild-moderate(1-2+). Normal pulmonic  valve. No pulmonic regurgitation.  Pericardium/Pleura: There is no pericardial effusion.  ------------------------------------------------------------------------  Post-Bypass Observations:    A transeptal puncture was performed using BILL guidance  with the puncture site 4.8cm above the mitral annulus.  The  guiding catheter was positioned with the tip across the  interatrial septum.  The MitraClip NTRwas introduced into  the left atrium and positioned over the mitral valve  leaflets.  2D and 3D guidance was used to position the  opened clip orthogonal to the commissural plane.  The  device was advanced across the mitral annulus and  re-positioned.  The anterior and posterior leaflets were  grasped at the more medial aspect of A2/P2, the grippers  were lowered and the clip was closed under biplane imaging  with and without color.  2D and biplane imaging were used  to ensure both leaflets were within the clip.  Color  Doppler revealed a significant jet lateral to the clip.  Prior to clip release, the peak/mean transmitral gradients=  6/2mmHg (HR= 81bpm).  The clip was released and appeared  well-seated.   A second MitraClip NTR was introduced into  the left atrium and positioned over the mitral valve  leaflets lateral to the first clip.  2D and 3D guidance was  used to position the opened clip orthogonal to the  commissural plane.  The device was advanced across the  mitral annulus and re-positioned.  The anterior and  posterior leaflets were grasped at A2/P2 lateral to the  first clip, the grippers were lowered and the clip was  closed under biplane imaging with and without color.  2D  and 3Dimaging was used to ensure both leaflets were within  the clip.  Color Doppler revealed very trivial residual  mitral regurgitation.  Prior to clip release, the peak/mean  transmitral gradients= 12/5mmHg (HR=  99bpm).  The clip was  released and appeared well-seated.  The final mitral valve  area was planimetered at  3.3cm2 (2.62 and 0.69cm2).  The  residual MR was very trivial.  Withdrawal of the catheters  was monitored continuously; there is a small residual  atrial septal defect seen by colorDoppler.  The guiding catheter was withdrawn into the right atrium  and positioned over the tricuspid annulus.  A MitraClip NTR  was introduced into the right atrium.  The device was  oriented over the septal and anterior leaflets.  The arms  were closed and the device was advanced across the  tricuspid annulus. The clip arms were opened and oriented  using the deep gastric and mid esophageal views over the  anterior and septal leaflets.  With repositioning there was  drift towards the posterior leaflet.  After multiple  attempts the device angle was unable to be oriented for  optimal leaflet grasping.  The device and guide and system  were ultimately withdrawn from the patient under continuous  monitoring.  There was no change in the tricuspid valve  anatomy or regurgitation.  There was no pericardial effusion.  There was mild-moderate  tricuspid regurgitation.  There was trivial mitral  regurgitation.  There was no aortic regurgitation.   The  right and left ventricular function were unchanged.  There  was no pericardial effusion.  Images were reviewed with the interventional/surgical team  throughout the study.  ------------------------------------------------------------------------  Conclusions:  Mitral imaging was off axis and difficult secondary to a  tortuous esophagus.    < end of copied text >      Stress Testing:     Cath:    Imaging:    Interpretation of Telemetry: Sinus 1st degree AV block w variable OK,  Aidan Hinton MD  Cardiology Fellow  [phone]  All Cardiology service information can be found 24/7 on [email or web address], password: [email or web address]    Patient seen and examined at bedside.    Overnight Events: Nausea and vomiting this morning. Otherwise has no complaints and states has felt well since procedure before his GI symptoms developed this morning.    REVIEW OF SYSTEMS:  General: no fatigue/malaise, weight loss/gain.  Skin: no rashes.  Ophthalmologic: no blurred vision, no loss of vision. 	  ENT: no sore throat, rhinorrhea, sinus congestion.  Respiratory: no SOB, cough or wheeze.  CV: No chest pain. No palpitations.   Gastrointestinal:  Nausea and vomiting  Genitourinary: no dysuria/hesitancy or hematuria.  Musculoskeletal: no myalgias or arthralgias.  Neurological: no changes in vision or hearing, no lightheadedness/dizziness, no syncope/near syncope	  Psychiatric: no unusual stress/anxiety.   Hematology/Lymphatics: no unusual bleeding, bruising and no lymphadenopathy.  Endocrine: no unusual thirst.        Current Meds:  allopurinol 100 milliGRAM(s) Oral daily  aspirin enteric coated 81 milliGRAM(s) Oral daily  atorvastatin 40 milliGRAM(s) Oral at bedtime  bisacodyl Suppository 10 milliGRAM(s) Rectal daily PRN  buDESOnide  80 MICROgram(s)/formoterol 4.5 MICROgram(s) Inhaler 2 Puff(s) Inhalation two times a day  chlorhexidine 2% Cloths 1 Application(s) Topical <User Schedule>  DOBUTamine Infusion 1.5 MICROgram(s)/kG/Min IV Continuous <Continuous>  ipratropium 17 MICROgram(s) HFA Inhaler 1 Puff(s) Inhalation every 6 hours  metoclopramide Injectable 5 milliGRAM(s) IV Push every 8 hours  pantoprazole  Injectable 40 milliGRAM(s) IV Push two times a day  rivaroxaban 15 milliGRAM(s) Oral daily  sodium chloride 0.9%. 1000 milliLiter(s) IV Continuous <Continuous>      PAST MEDICAL & SURGICAL HISTORY:  MR (mitral regurgitation)  Stented coronary artery  Sleep apnea  PAD (peripheral artery disease)  Gout  Hyperlipidemia, unspecified hyperlipidemia type  Essential hypertension  Coronary artery disease  Ankle fracture: s/p ORIF  History of appendectomy  H/O hernia repair      Vitals:  T(F): 98 (09-06), Max: 98.4 (09-05)  HR: 101 (09-06) (85 - 117)  BP: 115/76 (09-06) (104/64 - 134/68)  RR: 23 (09-06)  SpO2: 98% (09-06)  I&O's Summary    05 Sep 2019 07:01  -  06 Sep 2019 07:00  --------------------------------------------------------  IN: 1288.3 mL / OUT: 1200 mL / NET: 88.3 mL        Physical Exam:  Appearance: No acute distress  Eyes: PERRL, EOMI, pink conjunctiva  HENT: Normal oral mucosa  Cardiovascular: RRR, S1, S2, no murmurs, rubs, or gallops; no edema; no JVD  Respiratory: Clear to auscultation bilaterally  Gastrointestinal: soft, non-tender, non-distended with normal bowel sounds  Musculoskeletal: No clubbing; no joint deformity   Neurologic: Non-focal  Lymphatic: No lymphadenopathy  Psychiatry: AAOx3, mood & affect appropriate  Skin: No rashes, ecchymoses, or cyanosis                          13.1   5.9   )-----------( 176      ( 06 Sep 2019 12:06 )             41.1     09-06    140  |  102  |  25<H>  ----------------------------<  135<H>  4.4   |  25  |  1.48<H>    Ca    9.8      06 Sep 2019 06:20  Phos  3.7     09-05  Mg     2.4     09-05    TPro  8.2  /  Alb  3.9  /  TBili  0.8  /  DBili  0.2  /  AST  32  /  ALT  22  /  AlkPhos  117  09-06    PT/INR - ( 05 Sep 2019 02:03 )   PT: 12.8 sec;   INR: 1.11 ratio    PTT - ( 05 Sep 2019 02:03 )  PTT:29.6 sec      New ECG(s): Personally reviewed    Echo: < from: Intra-Operative Transesopha Echo (w/3D) (09.04.19 @ 09:34) >  Dimensions:    Normal Values:  LA:            2.0 - 4.0 cm  Ao:     3.8    2.0 - 3.8 cm  SEPTUM:       0.6 - 1.2 cm  PWT:           0.6 - 1.1 cm  LVIDd:         3.0 - 5.6 cm  LVIDs:         1.8 - 4.0 cm  EF (Visual Estimate): <20% %  ------------------------------------------------------------------------  Pre-Bypass Observations:  Mitral Valve: The mitral valve leaflets are  tethered/restricted. There is at least moderate-severe(3+)  mitral regurgitation.  The etiology is functional.   The  peak/mean transmitral gradients= 4/1mmHg (HR= 92bpm).  There is no mitral stenosis.  Aortic Valve/Aorta: Trileaflet aortic valve. There is no  aortic stenosis. No aortic valve regurgitation seen.  Aortic Root: 3.8 cm.  The aortic root is normal in size.  Left Atrium: There is no thrombus in the left atrium.  Left Ventricle:  The LVEF <20%. There is global hypokinesis  with minor regional variation. The left ventricle is  moderately dilated.  Right Heart: There is no thrombus in the right atrium. The  right ventricle is moderately dilated with moderately  reduced function. The tricuspid annulus is dilated.  After  medical optimization and under anesthesia, the tricuspid  regurgitation appeared mild-moderate(1-2+). Normal pulmonic  valve. No pulmonic regurgitation.  Pericardium/Pleura: There is no pericardial effusion.  ------------------------------------------------------------------------  Post-Bypass Observations:    A transeptal puncture was performed using BILL guidance  with the puncture site 4.8cm above the mitral annulus.  The  guiding catheter was positioned with the tip across the  interatrial septum.  The MitraClip NTRwas introduced into  the left atrium and positioned over the mitral valve  leaflets.  2D and 3D guidance was used to position the  opened clip orthogonal to the commissural plane.  The  device was advanced across the mitral annulus and  re-positioned.  The anterior and posterior leaflets were  grasped at the more medial aspect of A2/P2, the grippers  were lowered and the clip was closed under biplane imaging  with and without color.  2D and biplane imaging were used  to ensure both leaflets were within the clip.  Color  Doppler revealed a significant jet lateral to the clip.  Prior to clip release, the peak/mean transmitral gradients=  6/2mmHg (HR= 81bpm).  The clip was released and appeared  well-seated.   A second MitraClip NTR was introduced into  the left atrium and positioned over the mitral valve  leaflets lateral to the first clip.  2D and 3D guidance was  used to position the opened clip orthogonal to the  commissural plane.  The device was advanced across the  mitral annulus and re-positioned.  The anterior and  posterior leaflets were grasped at A2/P2 lateral to the  first clip, the grippers were lowered and the clip was  closed under biplane imaging with and without color.  2D  and 3Dimaging was used to ensure both leaflets were within  the clip.  Color Doppler revealed very trivial residual  mitral regurgitation.  Prior to clip release, the peak/mean  transmitral gradients= 12/5mmHg (HR=  99bpm).  The clip was  released and appeared well-seated.  The final mitral valve  area was planimetered at  3.3cm2 (2.62 and 0.69cm2).  The  residual MR was very trivial.  Withdrawal of the catheters  was monitored continuously; there is a small residual  atrial septal defect seen by colorDoppler.  The guiding catheter was withdrawn into the right atrium  and positioned over the tricuspid annulus.  A MitraClip NTR  was introduced into the right atrium.  The device was  oriented over the septal and anterior leaflets.  The arms  were closed and the device was advanced across the  tricuspid annulus. The clip arms were opened and oriented  using the deep gastric and mid esophageal views over the  anterior and septal leaflets.  With repositioning there was  drift towards the posterior leaflet.  After multiple  attempts the device angle was unable to be oriented for  optimal leaflet grasping.  The device and guide and system  were ultimately withdrawn from the patient under continuous  monitoring.  There was no change in the tricuspid valve  anatomy or regurgitation.  There was no pericardial effusion.  There was mild-moderate  tricuspid regurgitation.  There was trivial mitral  regurgitation.  There was no aortic regurgitation.   The  right and left ventricular function were unchanged.  There  was no pericardial effusion.  Images were reviewed with the interventional/surgical team  throughout the study.  ------------------------------------------------------------------------  Conclusions:  Mitral imaging was off axis and difficult secondary to a  tortuous esophagus.      Interpretation of Telemetry: Sinus 1st degree AV block w variable CA,  82.6

## 2020-11-24 ENCOUNTER — NON-APPOINTMENT (OUTPATIENT)
Age: 75
End: 2020-11-24

## 2020-11-24 ENCOUNTER — APPOINTMENT (OUTPATIENT)
Dept: CARDIOLOGY | Facility: CLINIC | Age: 75
End: 2020-11-24
Payer: COMMERCIAL

## 2020-11-24 VITALS
OXYGEN SATURATION: 96 % | SYSTOLIC BLOOD PRESSURE: 191 MMHG | BODY MASS INDEX: 30.05 KG/M2 | TEMPERATURE: 97.4 F | DIASTOLIC BLOOD PRESSURE: 82 MMHG | HEIGHT: 66 IN | HEART RATE: 88 BPM | WEIGHT: 187 LBS

## 2020-11-24 VITALS — SYSTOLIC BLOOD PRESSURE: 150 MMHG | DIASTOLIC BLOOD PRESSURE: 80 MMHG

## 2020-11-24 DIAGNOSIS — R94.39 ABNORMAL RESULT OF OTHER CARDIOVASCULAR FUNCTION STUDY: ICD-10-CM

## 2020-11-24 PROCEDURE — 93000 ELECTROCARDIOGRAM COMPLETE: CPT

## 2020-11-24 PROCEDURE — 99215 OFFICE O/P EST HI 40 MIN: CPT

## 2020-11-29 PROBLEM — R94.39 ABNORMAL NUCLEAR STRESS TEST: Status: ACTIVE | Noted: 2020-10-23

## 2020-11-29 NOTE — REASON FOR VISIT
[Follow-Up - Clinic] : a clinic follow-up of [FreeTextEntry2] : pretransplant cardiac evaluation prior to possible renal transplant [FreeTextEntry1] : July 2020 - Patient returns today in his usual state of health. He continues to have dialysis on [facility] via left forearm AV fistula.\par He and his family have not been sick during the Covid pandemic.\par He does not exercise.\par \par November 2020 - Patient returns today for follow-up after diagnostic left heart catheterization showed diffuse nonobstructive disease. No PCI was performed.

## 2020-11-29 NOTE — DISCUSSION/SUMMARY
[FreeTextEntry1] : Patient is a very pleasant 75 year-old gentleman with no known cardiovascular disease but multiple cardiovascular risk factors as above.\par Echocardiogram and nuclear stress testing in 2018 were normal. Nuclear stress testing was abnormal in 2020. Left heart cath showed diffuse nonobstructive disease. \par \par Recommend ongoing risk factor modification, including management of hypertension and diabetes.\par Increase carvedilol to 25 mg BID.\par Continue lisinopril to 40 mg daily.\par Increase atorvastatin to 40 mg daily, and change to rosuvastatin if patient's muscle cramps worsen.\par \par No other cardiovascular testing is necessary prior to possible renal transplant.

## 2020-11-29 NOTE — PHYSICAL EXAM
[General Appearance - Well Developed] : well developed [Normal Appearance] : normal appearance [Well Groomed] : well groomed [General Appearance - Well Nourished] : well nourished [No Deformities] : no deformities [General Appearance - In No Acute Distress] : no acute distress [Normal Oral Mucosa] : normal oral mucosa [No Oral Pallor] : no oral pallor [No Oral Cyanosis] : no oral cyanosis [Normal Oropharynx] : normal oropharynx [] : no respiratory distress [Respiration, Rhythm And Depth] : normal respiratory rhythm and effort [Exaggerated Use Of Accessory Muscles For Inspiration] : no accessory muscle use [Auscultation Breath Sounds / Voice Sounds] : lungs were clear to auscultation bilaterally [Bowel Sounds] : normal bowel sounds [Abdomen Soft] : soft [Abdomen Tenderness] : non-tender [Abnormal Walk] : normal gait [Gait - Sufficient For Exercise Testing] : the gait was sufficient for exercise testing [Nail Clubbing] : no clubbing of the fingernails [Cyanosis, Localized] : no localized cyanosis [Skin Color & Pigmentation] : normal skin color and pigmentation [No Venous Stasis] : no venous stasis [No Xanthoma] : no  xanthoma was observed [Oriented To Time, Place, And Person] : oriented to person, place, and time [Impaired Insight] : insight and judgment were intact [Affect] : the affect was normal [Mood] : the mood was normal [No Anxiety] : not feeling anxious [Conjunctiva] : the conjunctiva were normal in both eyes [PERRL] : pupils were equal in size, round, and reactive to light [EOM Intact] : extraocular movements were intact [5th Left ICS - MCL] : palpated at the 5th LICS in the midclavicular line [Normal] : normal [No Precordial Heave] : no precordial heave was noted [Normal Rate] : normal [Rhythm Regular] : regular [Normal S1] : normal S1 [Normal S2] : normal S2 [No Gallop] : no gallop heard [No Murmur] : no murmurs heard [I] : a grade 1 [2+] : right 2+ [No Pitting Edema] : no pitting edema present [FreeTextEntry1] : No JVD [Yellow Sclera (Icteric)] : no scleral icterus was seen [Right Carotid Bruit] : no bruit heard over the right carotid [Left Carotid Bruit] : no bruit heard over the left carotid

## 2020-11-29 NOTE — HISTORY OF PRESENT ILLNESS
[FreeTextEntry1] : Patient is a 75 year-old gentleman with known cardiovascular risk factors of hypertension, non-insulin dependent type II diabetes (needs better control and is planning to establish care with endocrinologist), and ESRD on HD (Tues-Thurs-Sat) who presents today in his usual state of health for evaluation prior to possible renal transplant.\par Patient has no chest pains or shortness of breath.\par Patient does not formally exercise, but he walks regularly and he plays pool. \par Patient used to follow with a cardiologist, but he has not seen him recently, and his most recent ischemic evaluation was approximately five years ago (negative stress test).\par \par PMD: Isabel Martinez MD [phone]\par Nephrologist: Kodi Olivares MD [phone]\par Endocrinologist: \par

## 2021-03-17 ENCOUNTER — NON-APPOINTMENT (OUTPATIENT)
Age: 76
End: 2021-03-17

## 2021-03-17 ENCOUNTER — APPOINTMENT (OUTPATIENT)
Dept: CARDIOLOGY | Facility: CLINIC | Age: 76
End: 2021-03-17
Payer: MEDICARE

## 2021-03-17 VITALS
OXYGEN SATURATION: 100 % | HEART RATE: 70 BPM | SYSTOLIC BLOOD PRESSURE: 200 MMHG | DIASTOLIC BLOOD PRESSURE: 82 MMHG | RESPIRATION RATE: 17 BRPM | BODY MASS INDEX: 30.22 KG/M2 | TEMPERATURE: 98 F | HEIGHT: 66 IN | WEIGHT: 188 LBS

## 2021-03-17 PROCEDURE — 93000 ELECTROCARDIOGRAM COMPLETE: CPT

## 2021-03-17 PROCEDURE — 99072 ADDL SUPL MATRL&STAF TM PHE: CPT

## 2021-03-17 PROCEDURE — 99214 OFFICE O/P EST MOD 30 MIN: CPT

## 2021-03-17 RX ORDER — OMEPRAZOLE 40 MG/1
40 CAPSULE, DELAYED RELEASE ORAL
Qty: 30 | Refills: 5 | Status: DISCONTINUED | COMMUNITY
Start: 2018-10-26 | End: 2021-03-17

## 2021-03-17 RX ORDER — OMEPRAZOLE 40 MG/1
40 CAPSULE, DELAYED RELEASE ORAL
Qty: 90 | Refills: 0 | Status: DISCONTINUED | COMMUNITY
Start: 2017-01-24 | End: 2021-03-17

## 2021-03-17 NOTE — HISTORY OF PRESENT ILLNESS
[FreeTextEntry1] : Patient is a 75 year-old gentleman with known cardiovascular risk factors of hypertension, non-insulin dependent type II diabetes (needs better control and is planning to establish care with endocrinologist), and ESRD on HD (Tues-Thurs-Sat) who presents today in his usual state of health for evaluation prior to possible renal transplant.\par Patient has no chest pains or shortness of breath.\par Patient does not formally exercise, but he walks regularly and he plays pool. \par Patient used to follow with a cardiologist, but he has not seen him recently, and his most recent ischemic evaluation was approximately five years ago (negative stress test).\par \par July 2020 - Patient returns today in his usual state of health. He continues to have dialysis on [facility] via left forearm AV fistula.\par He and his family have not been sick during the Covid pandemic.\par He does not exercise.\par \par November 2020 - Patient returns today for follow-up after diagnostic left heart catheterization showed diffuse nonobstructive disease. No PCI was performed. \par \par PMD: Isabel Martinez MD [phone]\par Nephrologist: Kodi Olivares MD [phone]\par Endocrinologist: \par

## 2021-03-17 NOTE — DISCUSSION/SUMMARY
[FreeTextEntry1] : Patient is a very pleasant 75 year-old gentleman with no known cardiovascular disease but multiple cardiovascular risk factors as above.\par Echocardiogram and nuclear stress testing in 2018 were normal. Nuclear stress testing was abnormal in 2020. Left heart cath showed diffuse nonobstructive disease. \par \par Recommend ongoing risk factor modification, including management of hypertension and diabetes.\par Increase carvedilol to 25 mg BID.\par Continue lisinopril to 40 mg daily.\par Continue atorvastatin to 40 mg daily.\par Will start amlodipine 5 mg daily at this time for additional blood pressure control.\par \par No other cardiovascular testing is necessary prior to possible renal transplant. \par \par Follow-up in one month for management of hypertension.

## 2021-03-17 NOTE — REASON FOR VISIT
[Follow-Up - Clinic] : a clinic follow-up of [FreeTextEntry2] : pretransplant cardiac evaluation prior to possible renal transplant [FreeTextEntry1] : March 2021 - Patient returns today for follow-up. \par He continues to have dialysis via left forearm AV fistula (Rhdqpba-Pjqpdral-Spaebvtz).\par He received the first dose of Pfizer Covid vaccine two weeks ago (his second dose is scheduled for next week).

## 2021-03-17 NOTE — CARDIOLOGY SUMMARY
[Normal] : normal [___] : [unfilled] [LVEF ___%] : LVEF [unfilled]% [None] : no pulmonary hypertension [Enlarged] : enlarged LA size [Moderate] : moderate mitral regurgitation [___] : [unfilled]

## 2021-04-13 ENCOUNTER — APPOINTMENT (OUTPATIENT)
Dept: TRANSPLANT | Facility: CLINIC | Age: 76
End: 2021-04-13
Payer: MEDICARE

## 2021-04-13 PROCEDURE — 99001T: CUSTOM

## 2021-05-07 ENCOUNTER — APPOINTMENT (OUTPATIENT)
Dept: TRANSPLANT | Facility: CLINIC | Age: 76
End: 2021-05-07
Payer: MEDICARE

## 2021-05-07 PROCEDURE — 99001T: CUSTOM

## 2021-05-24 ENCOUNTER — NON-APPOINTMENT (OUTPATIENT)
Age: 76
End: 2021-05-24

## 2021-05-24 ENCOUNTER — APPOINTMENT (OUTPATIENT)
Dept: CARDIOLOGY | Facility: CLINIC | Age: 76
End: 2021-05-24
Payer: MEDICARE

## 2021-05-24 VITALS
TEMPERATURE: 97.8 F | BODY MASS INDEX: 30.37 KG/M2 | HEART RATE: 81 BPM | HEIGHT: 66 IN | OXYGEN SATURATION: 97 % | RESPIRATION RATE: 17 BRPM | WEIGHT: 189 LBS | DIASTOLIC BLOOD PRESSURE: 84 MMHG | SYSTOLIC BLOOD PRESSURE: 168 MMHG

## 2021-05-24 DIAGNOSIS — Z23 ENCOUNTER FOR IMMUNIZATION: ICD-10-CM

## 2021-05-24 PROCEDURE — 36415 COLL VENOUS BLD VENIPUNCTURE: CPT

## 2021-05-24 PROCEDURE — 93000 ELECTROCARDIOGRAM COMPLETE: CPT

## 2021-05-24 PROCEDURE — 99215 OFFICE O/P EST HI 40 MIN: CPT

## 2021-05-24 RX ORDER — LINAGLIPTIN 5 MG/1
5 TABLET, FILM COATED ORAL
Qty: 30 | Refills: 0 | Status: DISCONTINUED | COMMUNITY
Start: 2016-11-10 | End: 2021-05-24

## 2021-05-24 RX ORDER — AMLODIPINE BESYLATE 5 MG/1
5 TABLET ORAL DAILY
Qty: 90 | Refills: 3 | Status: DISCONTINUED | COMMUNITY
Start: 2021-03-17 | End: 2021-05-24

## 2021-05-24 NOTE — PHYSICAL EXAM
[General Appearance - Well Developed] : well developed [Normal Appearance] : normal appearance [Well Groomed] : well groomed [General Appearance - Well Nourished] : well nourished [No Deformities] : no deformities [General Appearance - In No Acute Distress] : no acute distress [Normal Oral Mucosa] : normal oral mucosa [No Oral Pallor] : no oral pallor [No Oral Cyanosis] : no oral cyanosis [Normal Oropharynx] : normal oropharynx [] : no respiratory distress [Respiration, Rhythm And Depth] : normal respiratory rhythm and effort [Exaggerated Use Of Accessory Muscles For Inspiration] : no accessory muscle use [Auscultation Breath Sounds / Voice Sounds] : lungs were clear to auscultation bilaterally [Bowel Sounds] : normal bowel sounds [Abdomen Soft] : soft [Abdomen Tenderness] : non-tender [Gait - Sufficient For Exercise Testing] : the gait was sufficient for exercise testing [Abnormal Walk] : normal gait [Nail Clubbing] : no clubbing of the fingernails [Cyanosis, Localized] : no localized cyanosis [Skin Color & Pigmentation] : normal skin color and pigmentation [No Venous Stasis] : no venous stasis [No Xanthoma] : no  xanthoma was observed [Oriented To Time, Place, And Person] : oriented to person, place, and time [Impaired Insight] : insight and judgment were intact [Affect] : the affect was normal [Mood] : the mood was normal [No Anxiety] : not feeling anxious [Conjunctiva] : the conjunctiva were normal in both eyes [PERRL] : pupils were equal in size, round, and reactive to light [EOM Intact] : extraocular movements were intact [5th Left ICS - MCL] : palpated at the 5th LICS in the midclavicular line [Normal] : normal [No Precordial Heave] : no precordial heave was noted [Normal Rate] : normal [Rhythm Regular] : regular [Normal S1] : normal S1 [Normal S2] : normal S2 [No Gallop] : no gallop heard [No Murmur] : no murmurs heard [I] : a grade 1 [2+] : right 2+ [No Pitting Edema] : no pitting edema present [FreeTextEntry1] : No JVD [Yellow Sclera (Icteric)] : no scleral icterus was seen [Right Carotid Bruit] : no bruit heard over the right carotid [Left Carotid Bruit] : no bruit heard over the left carotid

## 2021-05-24 NOTE — DISCUSSION/SUMMARY
[FreeTextEntry1] : Patient is a very pleasant 76 year-old gentleman with no known cardiovascular disease but multiple cardiovascular risk factors as above.\par Echocardiogram and nuclear stress testing in 2018 were normal. Nuclear stress testing was abnormal in 2020. Left heart cath showed diffuse nonobstructive disease. \par Last week, May 2021, patient's ophthalmologist noted new hemorrhages in both eyes that were concerning for subacute endocarditis. Will send blood cultures and check TTE.\par \par Recommend ongoing risk factor modification, including management of hypertension and diabetes.\par Increase carvedilol to 25 mg BID.\par Continue lisinopril to 40 mg daily.\par Continue atorvastatin to 40 mg daily.\par Discontinued amlodipine 5 mg daily due to pedal edema. It resolved with stopping the medication.\par Will add isosorbide dinitrate for additional blood pressure control. \par \par No other cardiovascular testing is necessary prior to possible renal transplant.

## 2021-05-24 NOTE — REASON FOR VISIT
[Other: ____] : [unfilled] [FreeTextEntry1] : May 2021 - Patient returns today for follow-up.\par His ophthalmologist is concerned about white contoured hemorrhages in both eyes. Patient has no reported fevers or chills. He saw him last week and has requested that we send blood cultures and check an echocardiogram.\par He continues to have HD via left radial AV fistula (Nfpaeqo-Odwojofs-Ibmhqbnc).\par He has completed his vaccine series for Covid-19 (Pfizer).

## 2021-05-24 NOTE — CARDIOLOGY SUMMARY
[de-identified] : 8/202/2018, normal, sinus at 70 bpm [de-identified] : 10/19/2020, basal inferolateral defect that partially corrects, suggestive of infarct with marie-infarct ischemia, inferolateral defect that corrected on 2018 study, but does not correct now, LVEF 62%, LVEDV 90 mL [de-identified] : 9/28/2020, normal LV function, no pulmonary hypertension, enlarged LA size, moderate mitral regurgitation LVEF 50-55%.  [de-identified] : 11/12/2020 with Gio Painting MD diffuse mild-moderate coronary artery disease

## 2021-05-24 NOTE — HISTORY OF PRESENT ILLNESS
[FreeTextEntry1] : Patient is a 76 year-old gentleman with known cardiovascular risk factors of hypertension, non-insulin dependent type II diabetes (needs better control and is planning to establish care with endocrinologist), and ESRD on HD (Tues-Thurs-Sat) who presents today in his usual state of health for evaluation prior to possible renal transplant.\par Patient has no chest pains or shortness of breath.\par Patient does not formally exercise, but he walks regularly and he plays pool. \par Patient used to follow with a cardiologist, but he has not seen him recently, and his most recent ischemic evaluation was approximately five years ago (negative stress test).\par \par July 2020 - Patient returns today in his usual state of health. He continues to have dialysis on Jrtfhsx-Laddzzeh-Ksyvznuy via left forearm AV fistula.\par He and his family have not been sick during the Covid pandemic.\par He does not exercise.\par \par November 2020 - Patient returns today for follow-up after diagnostic left heart catheterization showed diffuse nonobstructive disease. No PCI was performed. \par \par March 2021 - Patient returns today for follow-up. \par He continues to have dialysis via left forearm AV fistula (Hhjaejj-Nfzybwts-Mfgtlzyz).\par He received the first dose of Pfizer Covid vaccine two weeks ago (his second dose is scheduled for next week). \par \par PMD: Isabel Martinez MD [phone]\par Nephrologist: Kodi Olivares MD [phone]\par Endocrinologist: \par Ophthalmologist: Kieran Gifford MD [phone]

## 2021-05-31 LAB
BACTERIA BLD CULT: NORMAL
BACTERIA BLD CULT: NORMAL

## 2021-06-08 ENCOUNTER — APPOINTMENT (OUTPATIENT)
Dept: TRANSPLANT | Facility: CLINIC | Age: 76
End: 2021-06-08
Payer: MEDICARE

## 2021-06-08 PROCEDURE — 99001T: CUSTOM

## 2021-06-11 ENCOUNTER — APPOINTMENT (OUTPATIENT)
Dept: CARDIOLOGY | Facility: CLINIC | Age: 76
End: 2021-06-11
Payer: MEDICARE

## 2021-06-11 PROCEDURE — 93306 TTE W/DOPPLER COMPLETE: CPT

## 2021-06-14 ENCOUNTER — APPOINTMENT (OUTPATIENT)
Dept: CARDIOLOGY | Facility: CLINIC | Age: 76
End: 2021-06-14
Payer: MEDICARE

## 2021-06-14 ENCOUNTER — NON-APPOINTMENT (OUTPATIENT)
Age: 76
End: 2021-06-14

## 2021-06-14 VITALS
HEART RATE: 80 BPM | SYSTOLIC BLOOD PRESSURE: 140 MMHG | WEIGHT: 188 LBS | OXYGEN SATURATION: 100 % | TEMPERATURE: 97.8 F | DIASTOLIC BLOOD PRESSURE: 80 MMHG | BODY MASS INDEX: 30.34 KG/M2

## 2021-06-14 DIAGNOSIS — H35.60 RETINAL HEMORRHAGE, UNSPECIFIED EYE: ICD-10-CM

## 2021-06-14 PROCEDURE — 93000 ELECTROCARDIOGRAM COMPLETE: CPT

## 2021-06-14 PROCEDURE — 99215 OFFICE O/P EST HI 40 MIN: CPT

## 2021-06-14 NOTE — CARDIOLOGY SUMMARY
[de-identified] : 10/19/2020, basal inferolateral defect that partially corrects, suggestive of infarct with marie-infarct ischemia, inferolateral defect that corrected on 2018 study, but does not correct now, LVEF 62%, LVEDV 90 mL [de-identified] : 8/202/2018, normal, sinus at 70 bpm [de-identified] : 9/28/2020, normal LV function, no pulmonary hypertension, enlarged LA size, moderate mitral regurgitation LVEF 50-55%. \par 5/27/2021, normal LV function, no pulmonary hypertension, normal LA size, minimal MR, LVEF 55-60% [de-identified] : 11/12/2020 with Gio Painting MD diffuse mild-moderate coronary artery disease

## 2021-06-14 NOTE — DISCUSSION/SUMMARY
[FreeTextEntry1] : Patient is a very pleasant 76 year-old gentleman with no known cardiovascular disease but multiple cardiovascular risk factors as above.\par Echocardiogram and nuclear stress testing in 2018 were normal. Nuclear stress testing was abnormal in 2020. Left heart cath showed diffuse nonobstructive disease. \par May 2021, patient's ophthalmologist noted new hemorrhages in both eyes that were concerning for subacute endocarditis - TTE was unchanged from prior and patient has had no fevers, chills, or neurologic symptoms.\par \par Recommend ongoing risk factor modification, including management of hypertension and diabetes.\par Increase carvedilol to 25 mg BID.\par Continue lisinopril to 40 mg daily.\par Continue atorvastatin to 40 mg daily.\par Discontinued amlodipine 5 mg daily due to pedal edema. It resolved with stopping the medication.\par Will reduce isosorbide dinitrate from 10 mg TID to 5 mg TID to address symptoms. \par \par No other cardiovascular testing is necessary prior to possible renal transplant.  negative detailed exam mouth

## 2021-06-14 NOTE — REASON FOR VISIT
[Other: ____] : [unfilled] [FreeTextEntry1] : June 2021 - Patient returns today for follow-up. His echocardiogram last week was unchanged from prior and had no evidence of infective endocarditis or source of emboli (the ophthalmologist was concerned).\par His ophthalmologist is concerned about white contoured hemorrhages in both eyes. Patient has no reported fevers or chills. He saw him last week and has requested that we send blood cultures and check an echocardiogram.\par He continues to have HD via left radial AV fistula (Qqfbjnj-Eaisujub-Cqjycbao).\par At the time of his last visit, he was started on Isordil 10 mg TID. He does not take it on dialysis days. Even on non-dialysis days, he finds that the medication makes him feel weak and tired.

## 2021-07-13 ENCOUNTER — APPOINTMENT (OUTPATIENT)
Dept: TRANSPLANT | Facility: CLINIC | Age: 76
End: 2021-07-13
Payer: MEDICARE

## 2021-07-13 PROCEDURE — 86832 HLA CLASS I HIGH DEFIN QUAL: CPT

## 2021-07-13 PROCEDURE — 99001T: CUSTOM

## 2021-07-13 PROCEDURE — 86833 HLA CLASS II HIGH DEFIN QUAL: CPT

## 2021-09-16 ENCOUNTER — APPOINTMENT (OUTPATIENT)
Dept: TRANSPLANT | Facility: CLINIC | Age: 76
End: 2021-09-16
Payer: MEDICARE

## 2021-09-16 PROCEDURE — 99001T: CUSTOM

## 2021-11-10 ENCOUNTER — APPOINTMENT (OUTPATIENT)
Dept: TRANSPLANT | Facility: CLINIC | Age: 76
End: 2021-11-10
Payer: MEDICARE

## 2021-11-10 PROCEDURE — 99001T: CUSTOM

## 2021-11-12 ENCOUNTER — APPOINTMENT (OUTPATIENT)
Dept: CARDIOLOGY | Facility: CLINIC | Age: 76
End: 2021-11-12
Payer: MEDICARE

## 2021-11-12 ENCOUNTER — NON-APPOINTMENT (OUTPATIENT)
Age: 76
End: 2021-11-12

## 2021-11-12 VITALS — SYSTOLIC BLOOD PRESSURE: 140 MMHG | DIASTOLIC BLOOD PRESSURE: 88 MMHG

## 2021-11-12 VITALS
SYSTOLIC BLOOD PRESSURE: 178 MMHG | HEART RATE: 78 BPM | BODY MASS INDEX: 29.38 KG/M2 | WEIGHT: 182 LBS | DIASTOLIC BLOOD PRESSURE: 92 MMHG | OXYGEN SATURATION: 98 %

## 2021-11-12 PROCEDURE — 93000 ELECTROCARDIOGRAM COMPLETE: CPT

## 2021-11-12 PROCEDURE — 99215 OFFICE O/P EST HI 40 MIN: CPT

## 2021-11-13 NOTE — HISTORY OF PRESENT ILLNESS
[FreeTextEntry1] : Patient is a 76 year-old gentleman with known cardiovascular risk factors of hypertension, non-insulin dependent type II diabetes (needs better control and is planning to establish care with endocrinologist), and ESRD on HD (Tues-Thurs-Sat) who presents today in his usual state of health for evaluation prior to possible renal transplant.\par Patient has no chest pains or shortness of breath.\par Patient does not formally exercise, but he walks regularly and he plays pool. \par Patient used to follow with a cardiologist, but he has not seen him recently, and his most recent ischemic evaluation was approximately five years ago (negative stress test).\par \par July 2020 - Patient returns today in his usual state of health. He continues to have dialysis on Qwovfsc-Mudccjii-Ijmdfcgn via left forearm AV fistula.\par He and his family have not been sick during the Covid pandemic.\par He does not exercise.\par \par November 2020 - Patient returns today for follow-up after diagnostic left heart catheterization showed diffuse nonobstructive disease. No PCI was performed. \par \par March 2021 - Patient returns today for follow-up. \par He continues to have dialysis via left forearm AV fistula (Iyzzura-Hyfrtccs-Rbziuszf).\par He received the first dose of Pfizer Covid vaccine two weeks ago (his second dose is scheduled for next week). \par \par May 2021 - Patient returns today for follow-up.\par His ophthalmologist is concerned about white contoured hemorrhages in both eyes. Patient has no reported fevers or chills. He saw him last week and has requested that we send blood cultures and check an echocardiogram.\par He continues to have HD via left radial AV fistula (Mjthjlz-Xdjtpidf-Wnhmpvjh).\par He has completed his vaccine series for Covid-19 (Pfizer).\par \par June 2021 - Patient returns today for follow-up. His echocardiogram last week was unchanged from prior and had no evidence of infective endocarditis or source of emboli (the ophthalmologist was concerned).\par His ophthalmologist is concerned about white contoured hemorrhages in both eyes. Patient has no reported fevers or chills. He saw him last week and has requested that we send blood cultures and check an echocardiogram.\par He continues to have HD via left radial AV fistula (Ucxdqyo-Epbwuxih-Hnkqzqgg).\par At the time of his last visit, he was started on Isordil 10 mg TID. He does not take it on dialysis days. Even on non-dialysis days, he finds that the medication makes him feel weak and tired.\par \par PMD: Isabel Martinez MD [phone]\par Nephrologist: Kodi Olivares MD [phone]\par Endocrinologist: \par Ophthalmologist: Kieran Gifford MD [phone]

## 2021-11-13 NOTE — PHYSICAL EXAM
[General Appearance - Well Developed] : well developed [Normal Appearance] : normal appearance [Well Groomed] : well groomed [General Appearance - Well Nourished] : well nourished [No Deformities] : no deformities [General Appearance - In No Acute Distress] : no acute distress [Normal Oral Mucosa] : normal oral mucosa [No Oral Pallor] : no oral pallor [No Oral Cyanosis] : no oral cyanosis [Normal Oropharynx] : normal oropharynx [] : no respiratory distress [Respiration, Rhythm And Depth] : normal respiratory rhythm and effort [Exaggerated Use Of Accessory Muscles For Inspiration] : no accessory muscle use [Auscultation Breath Sounds / Voice Sounds] : lungs were clear to auscultation bilaterally [Bowel Sounds] : normal bowel sounds [Abdomen Tenderness] : non-tender [Abdomen Soft] : soft [Abnormal Walk] : normal gait [Gait - Sufficient For Exercise Testing] : the gait was sufficient for exercise testing [Nail Clubbing] : no clubbing of the fingernails [Cyanosis, Localized] : no localized cyanosis [Skin Color & Pigmentation] : normal skin color and pigmentation [No Venous Stasis] : no venous stasis [No Xanthoma] : no  xanthoma was observed [Oriented To Time, Place, And Person] : oriented to person, place, and time [Impaired Insight] : insight and judgment were intact [Affect] : the affect was normal [Mood] : the mood was normal [No Anxiety] : not feeling anxious [Conjunctiva] : the conjunctiva were normal in both eyes [PERRL] : pupils were equal in size, round, and reactive to light [EOM Intact] : extraocular movements were intact [5th Left ICS - MCL] : palpated at the 5th LICS in the midclavicular line [Normal] : normal [No Precordial Heave] : no precordial heave was noted [Normal Rate] : normal [Rhythm Regular] : regular [Normal S1] : normal S1 [Normal S2] : normal S2 [No Gallop] : no gallop heard [No Murmur] : no murmurs heard [I] : a grade 1 [2+] : right 2+ [No Pitting Edema] : no pitting edema present [FreeTextEntry1] : No JVD [Yellow Sclera (Icteric)] : no scleral icterus was seen [Right Carotid Bruit] : no bruit heard over the right carotid [Left Carotid Bruit] : no bruit heard over the left carotid

## 2021-11-13 NOTE — CARDIOLOGY SUMMARY
[de-identified] : 8/202/2018, normal, sinus at 70 bpm [de-identified] : 10/19/2020, basal inferolateral defect that partially corrects, suggestive of infarct with marie-infarct ischemia, inferolateral defect that corrected on 2018 study, but does not correct now, LVEF 62%, LVEDV 90 mL [de-identified] : 9/28/2020, normal LV function, no pulmonary hypertension, enlarged LA size, moderate mitral regurgitation LVEF 50-55%. \par 5/27/2021, normal LV function, no pulmonary hypertension, normal LA size, minimal MR, LVEF 55-60% [de-identified] : 11/12/2020 with Gio Painting MD diffuse mild-moderate coronary artery disease

## 2021-11-13 NOTE — DISCUSSION/SUMMARY
[FreeTextEntry1] : Patient is a very pleasant 76 year-old gentleman with no known cardiovascular disease but multiple cardiovascular risk factors as above.\par Echocardiogram and nuclear stress testing in 2018 were normal. Nuclear stress testing was abnormal in 2020. Left heart cath showed diffuse nonobstructive disease. \par May 2021, patient's ophthalmologist noted new hemorrhages in both eyes that were concerning for subacute endocarditis - TTE was unchanged from prior and patient has had no fevers, chills, or neurologic symptoms.\par \par Recommend ongoing risk factor modification, including management of hypertension and diabetes.\par Increase carvedilol to 25 mg BID.\par Continue lisinopril to 40 mg daily.\par Continue atorvastatin to 40 mg daily.\par Discontinued amlodipine 5 mg daily due to pedal edema. It resolved with stopping the medication.\par Will reduce isosorbide dinitrate from 10 mg TID to 5 mg TID to address symptoms. \par \par No other cardiovascular testing is necessary prior to possible renal transplant.

## 2022-02-03 NOTE — H&P PST ADULT - RS GEN PE MLT RESP DETAILS PC
Glycopyrrolate Pregnancy And Lactation Text: This medication is Pregnancy Category B and is considered safe during pregnancy. It is unknown if it is excreted breast milk. no wheezes/airway patent/no chest wall tenderness/breath sounds equal/good air movement/no subcutaneous emphysema/respirations non-labored/no rales/no intercostal retractions/no rhonchi/clear to auscultation bilaterally

## 2022-02-07 ENCOUNTER — APPOINTMENT (OUTPATIENT)
Dept: TRANSPLANT | Facility: CLINIC | Age: 77
End: 2022-02-07
Payer: MEDICARE

## 2022-02-07 PROCEDURE — 99001T: CUSTOM

## 2022-02-07 PROCEDURE — 86832 HLA CLASS I HIGH DEFIN QUAL: CPT

## 2022-02-07 PROCEDURE — 86833 HLA CLASS II HIGH DEFIN QUAL: CPT

## 2022-02-22 ENCOUNTER — NON-APPOINTMENT (OUTPATIENT)
Age: 77
End: 2022-02-22

## 2022-02-23 ENCOUNTER — LABORATORY RESULT (OUTPATIENT)
Age: 77
End: 2022-02-23

## 2022-02-23 ENCOUNTER — APPOINTMENT (OUTPATIENT)
Dept: TRANSPLANT | Facility: CLINIC | Age: 77
End: 2022-02-23
Payer: COMMERCIAL

## 2022-02-23 VITALS
WEIGHT: 179 LBS | HEIGHT: 66 IN | RESPIRATION RATE: 17 BRPM | DIASTOLIC BLOOD PRESSURE: 79 MMHG | BODY MASS INDEX: 28.77 KG/M2 | SYSTOLIC BLOOD PRESSURE: 192 MMHG | OXYGEN SATURATION: 98 % | HEART RATE: 87 BPM | TEMPERATURE: 97.4 F

## 2022-02-23 PROCEDURE — 99215 OFFICE O/P EST HI 40 MIN: CPT

## 2022-02-23 PROCEDURE — 99072 ADDL SUPL MATRL&STAF TM PHE: CPT

## 2022-02-23 NOTE — ASSESSMENT
[FreeTextEntry1] : \par \par Based on my review of GISELA‘s history, medical chart, review of systems and my physical exam, I believe he is a good candidate. We should proceed with our protocol for evaluation for kidney transplantation. \par Specific Issues: Remains a good candidate for kidney transplantation\par Looks younger than stated age\par

## 2022-02-23 NOTE — HISTORY OF PRESENT ILLNESS
[TextBox_42] : 75 years old [place] man, living in  since 1970. Patient has known CKD (), ESRD (2018) on follow up with / Dr. Olivares is here for a f/u for kidney transplant evaluation. \par He has known DM (age 57) not on insulin; HTN (age 57).\par No known h/o kidney stone/ Prostatism.\par No hematuria/Nocturia/Transfusions\par Urine out put: 1-2 cups/day\par Has no h/o UTI. Pneumonia 25 years ago, was not hospitalized\par No known h/o active CAD/CVA/PVD/DVT/neoplasia/active infections/bleeding.\par No illness or hospitalization in the last 6 months. \par Past surgeries:\par Appendectomy, at age 20; Left forearm AVG, Dr. Retana and Dr. Montes De Oca.\par No history of kidney/ bladder/ prostate surgery.\par Non smoker. Quit 40 years ago, 1/2 ppd for 5 years.\par Fam: Parents are . Father - CAD Mother- stomach cancer Siblings-brother had stomach cancer.\par Children: 43 years old son,  , [employer] graduate , lives in [place]. Healthy. \par Has family history of kidney disease- sister is on dialysis, not diabetic, lives in [place]. She is on dialysis after losing transplant after 14 years (from daughter).(Padmini Waters, transplanted at [facility])\par \par Functional status - Independent for ADL, Able to walk 3-4 blocks and can climb stairs without difficulty.\par ROS: Has no h/o shortness of breath on exertion.\par Functional/employment status: Retired, used to work in [employer] in [place], as a  for dresses and also as building . 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 dialysis unit. Blood pressure normal at dialysis. \par Kidney Biopsy:none\par Potential Live donors: son- Rubén Waters\par \par Last Seen 18\par Next Apt TBD\par Listed 18\par ABO O\par PRA 0% 2018\par Cause of Kidney failure DM\par Other Med Hx ESRD 2018 ( CKD ), BPH, T2DM age 57, Steal Syndrome, HTN age 57, PNA, HLD	\par \par Most recent testing\par \par Cardiac- seen by Dr Dumas 2021 and cleared.\par EC, normal, sinus at 70 bpm \par Stress Test: 10/19/2020, basal inferolateral defect that partially corrects, suggestive of infarct with marie-infarct ischemia, inferolateral defect that corrected on 2018 study, but does not correct now, LVEF 62%, LVEDV 90 mL \par Echo: 2020, normal LV function, no pulmonary hypertension, enlarged LA size, moderate mitral regurgitation LVEF 50-55%. \par 2021, normal LV function, no pulmonary hypertension, normal LA size, minimal MR, LVEF 55-60% \par Cardiac Cath/PCI: 2020 with Gio Painting MD diffuse mild-moderate coronary artery disease \par \par \par Radiology\par CXR – 2018 clear lungs \par US Abd/doppler – 18 Liver,bile ducts, GB, pancreas, spleen are WNL. Right kidney: 10.2 cm. No hydronephrosis. 1.5 cm lower pole cyst.Left kidney: 10 cm. No hydronephrosis The aorta, common and external iliac arteries are patent without evidence to suggest a significant stenosis. The IVC and iliac veins are patent demonstrating phasic waveforms. There is no aortic aneurysm. The common and external iliac arteries are normal in caliber\par \par Cancer Screening\par Colonoscopy – 17 internal hemorrhoids \par PSA: 18 - 0.91\par  [de-identified] : Patient scheduled for annual follow up. \par Recently well, no illness, hospitalisation\par IHD TTS\par no covid infection, vaccinated with booster\par \par lives at home with wife in 2 story house - walks up and down stairs 8x/d

## 2022-02-23 NOTE — HISTORY OF PRESENT ILLNESS
[de-identified] : Patient scheduled for annual follow up. \par Pt is listed for renal transplant. \par Cause of renal failure DM\par \par Last Seen 2018\par Listed 2018\par Dialysis 2018\par ABo O\par PRA 0%\par \par Care Team\par PMD: Isabel Martinez MD [phone]\par Nephrologist: Kodi Olivares MD [phone]\par Endocrinologist: \par Ophthalmologist: Kieran Gifford MD [phone] \par \par Most recent testing\par Cardiac- seen by Dr Dumas 2021 and cleared.\par EC, normal, sinus at 70 bpm \par Stress Test: 10/19/2020, basal inferolateral defect that partially corrects, suggestive of infarct with marie-infarct ischemia, inferolateral defect that corrected on 2018 study, but does not correct now, LVEF 62%, LVEDV 90 mL \par Echo: 2020, normal LV function, no pulmonary hypertension, enlarged LA size, moderate mitral regurgitation LVEF 50-55%. \par 2021, normal LV function, no pulmonary hypertension, normal LA size, minimal MR, LVEF 55-60% \par Cardiac Cath/PCI: 2020 with Gio Painting MD diffuse mild-moderate coronary artery disease \par \par Radiology\par CT abd and Pelvis 2018 Mild atheromatous changes of the abd aorta and iliac arteries. sig narrowing of the superior mesenteric artery origin. native kidneys are small with scattered indeterminate nodular foci. Left adrenal myelolipoma. \par Chest Xray 7/15/2020 Clear Lungs. \par \par Cancer SCreening\par PSA 10/13/2020 1.14\par Colonoscopy 2018 Repeat 3-5 years. \par  \par

## 2022-02-23 NOTE — ASSESSMENT
[Good candidate] : a good candidate. We should proceed with our protocol for evaluation for kidney transplantation. [FreeTextEntry1] : Remains a good candidate for kidney transplantation\par Looks younger than stated age\par

## 2022-02-23 NOTE — REASON FOR VISIT
[Follow-Up] : a follow-up visit for [End-Stage Renal Disease] : end-stage renal disease [Kidney Transplant Evaluation] : kidney transplant evaluation

## 2022-02-24 ENCOUNTER — APPOINTMENT (OUTPATIENT)
Dept: TRANSPLANT | Facility: CLINIC | Age: 77
End: 2022-02-24
Payer: MEDICARE

## 2022-02-24 PROCEDURE — 99001T: CUSTOM

## 2022-02-24 PROCEDURE — 86832 HLA CLASS I HIGH DEFIN QUAL: CPT

## 2022-02-24 PROCEDURE — 86833 HLA CLASS II HIGH DEFIN QUAL: CPT

## 2022-02-28 ENCOUNTER — APPOINTMENT (OUTPATIENT)
Dept: TRANSPLANT | Facility: CLINIC | Age: 77
End: 2022-02-28

## 2022-03-02 ENCOUNTER — APPOINTMENT (OUTPATIENT)
Dept: CARDIOLOGY | Facility: CLINIC | Age: 77
End: 2022-03-02
Payer: MEDICARE

## 2022-03-02 ENCOUNTER — NON-APPOINTMENT (OUTPATIENT)
Age: 77
End: 2022-03-02

## 2022-03-02 VITALS
HEART RATE: 82 BPM | DIASTOLIC BLOOD PRESSURE: 80 MMHG | SYSTOLIC BLOOD PRESSURE: 160 MMHG | WEIGHT: 180 LBS | BODY MASS INDEX: 29.05 KG/M2 | OXYGEN SATURATION: 100 %

## 2022-03-02 DIAGNOSIS — F32.A ANXIETY DISORDER, UNSPECIFIED: ICD-10-CM

## 2022-03-02 DIAGNOSIS — F41.9 ANXIETY DISORDER, UNSPECIFIED: ICD-10-CM

## 2022-03-02 PROCEDURE — 99215 OFFICE O/P EST HI 40 MIN: CPT

## 2022-03-02 PROCEDURE — 93000 ELECTROCARDIOGRAM COMPLETE: CPT | Mod: NC

## 2022-03-03 LAB
ABO + RH PNL BLD: NORMAL
ALBUMIN SERPL ELPH-MCNC: 4.7 G/DL
ALP BLD-CCNC: 167 U/L
ALT SERPL-CCNC: 54 U/L
ANION GAP SERPL CALC-SCNC: 14 MMOL/L
APPEARANCE: CLEAR
AST SERPL-CCNC: 23 U/L
BASOPHILS # BLD AUTO: 0.04 K/UL
BASOPHILS NFR BLD AUTO: 0.6 %
BILIRUB SERPL-MCNC: 0.6 MG/DL
BILIRUBIN URINE: NEGATIVE
BLOOD URINE: ABNORMAL
BUN SERPL-MCNC: 44 MG/DL
C PEPTIDE SERPL-MCNC: 23.2 NG/ML
CALCIUM SERPL-MCNC: 9.5 MG/DL
CHLORIDE SERPL-SCNC: 97 MMOL/L
CHOLEST SERPL-MCNC: 125 MG/DL
CK SERPL-CCNC: 118 U/L
CMV IGG SERPL QL: 1.3 U/ML
CMV IGG SERPL-IMP: POSITIVE
CO2 SERPL-SCNC: 29 MMOL/L
COLOR: NORMAL
COVID-19 SPIKE DOMAIN ANTIBODY INTERPRETATION: POSITIVE
CREAT SERPL-MCNC: 9.25 MG/DL
CREAT SPEC-SCNC: 77 MG/DL
CREAT/PROT UR: 4.5 RATIO
CRP SERPL-MCNC: <3 MG/L
EBV EA AB SER IA-ACNC: <5 U/ML
EBV EA AB TITR SER IF: POSITIVE
EBV EA IGG SER QL IA: >600 U/ML
EBV EA IGG SER-ACNC: NEGATIVE
EBV EA IGM SER IA-ACNC: NEGATIVE
EBV PATRN SPEC IB-IMP: NORMAL
EBV VCA IGG SER IA-ACNC: 546 U/ML
EBV VCA IGM SER QL IA: <10 U/ML
EOSINOPHIL # BLD AUTO: 0.21 K/UL
EOSINOPHIL NFR BLD AUTO: 3.2 %
EPSTEIN-BARR VIRUS CAPSID ANTIGEN IGG: POSITIVE
ERYTHROCYTE [SEDIMENTATION RATE] IN BLOOD BY WESTERGREN METHOD: 36 MM/HR
ESTIMATED AVERAGE GLUCOSE: 131 MG/DL
GLUCOSE QUALITATIVE U: ABNORMAL
GLUCOSE SERPL-MCNC: 165 MG/DL
HAV IGM SER QL: NONREACTIVE
HBA1C MFR BLD HPLC: 6.2 %
HBV CORE IGG+IGM SER QL: NONREACTIVE
HBV SURFACE AB SER QL: REACTIVE
HBV SURFACE AB SERPL IA-ACNC: 55.7 MIU/ML
HBV SURFACE AG SER QL: NONREACTIVE
HCT VFR BLD CALC: 36.3 %
HCV AB SER QL: NONREACTIVE
HCV S/CO RATIO: 0.07 S/CO
HDLC SERPL-MCNC: 28 MG/DL
HEPATITIS A IGG ANTIBODY: REACTIVE
HGB BLD-MCNC: 11.2 G/DL
HIV1+2 AB SPEC QL IA.RAPID: NONREACTIVE
HSV 1+2 IGG SER IA-IMP: NEGATIVE
HSV 1+2 IGG SER IA-IMP: POSITIVE
HSV 1+2 IGG SER IA-IMP: POSITIVE
HSV1 IGG SER QL: 40.4 INDEX
HSV1 IGG SER QL: 40.4 INDEX
HSV1 IGM SER QL: NEGATIVE
HSV2 AB FLD-ACNC: NEGATIVE
HSV2 IGG SER QL: 0.12 INDEX
IMM GRANULOCYTES NFR BLD AUTO: 0.3 %
KETONES URINE: NEGATIVE
LDLC SERPL CALC-MCNC: 45 MG/DL
LEUKOCYTE ESTERASE URINE: NEGATIVE
LYMPHOCYTES # BLD AUTO: 1.43 K/UL
LYMPHOCYTES NFR BLD AUTO: 21.6 %
M TB IFN-G BLD-IMP: NEGATIVE
MAGNESIUM SERPL-MCNC: 2.4 MG/DL
MAN DIFF?: NORMAL
MCHC RBC-ENTMCNC: 30.9 GM/DL
MCHC RBC-ENTMCNC: 34 PG
MCV RBC AUTO: 110.3 FL
MONOCYTES # BLD AUTO: 0.56 K/UL
MONOCYTES NFR BLD AUTO: 8.4 %
NEUTROPHILS # BLD AUTO: 4.37 K/UL
NEUTROPHILS NFR BLD AUTO: 65.9 %
NITRITE URINE: NEGATIVE
NONHDLC SERPL-MCNC: 96 MG/DL
PH URINE: 8
PHOSPHATE SERPL-MCNC: 4.1 MG/DL
PLATELET # BLD AUTO: 230 K/UL
POTASSIUM SERPL-SCNC: 6 MMOL/L
PROT SERPL-MCNC: 7 G/DL
PROT UR-MCNC: 345 MG/DL
PROTEIN URINE: ABNORMAL
PSA SERPL-MCNC: 1.13 NG/ML
QUANTIFERON TB PLUS MITOGEN MINUS NIL: 10 IU/ML
QUANTIFERON TB PLUS NIL: 0 IU/ML
QUANTIFERON TB PLUS TB1 MINUS NIL: 0 IU/ML
QUANTIFERON TB PLUS TB2 MINUS NIL: 0 IU/ML
RBC # BLD: 3.29 M/UL
RBC # FLD: 16 %
RUBV IGG FLD-ACNC: 21.5 INDEX
RUBV IGG SER-IMP: POSITIVE
SARS-COV-2 AB SERPL IA-ACNC: >250 U/ML
SODIUM SERPL-SCNC: 140 MMOL/L
SPECIFIC GRAVITY URINE: 1.02
T GONDII AB SER-IMP: POSITIVE
T GONDII IGG SER QL: 19.6 IU/ML
T PALLIDUM AB SER QL IA: NEGATIVE
T3 SERPL-MCNC: 91 NG/DL
T4 FREE SERPL-MCNC: 1.1 NG/DL
TRIGL SERPL-MCNC: 257 MG/DL
TSH SERPL-ACNC: 0.54 UIU/ML
URATE SERPL-MCNC: 5.1 MG/DL
UROBILINOGEN URINE: NORMAL
VZV AB TITR SER: POSITIVE
VZV IGG SER IF-ACNC: 1614 INDEX
WBC # FLD AUTO: 6.63 K/UL

## 2022-03-05 NOTE — CARDIOLOGY SUMMARY
Done--thank you.     Ledy Taylor MD     [de-identified] : 8/202/2018, normal, sinus at 70 bpm [de-identified] : 10/19/2020, basal inferolateral defect that partially corrects, suggestive of infarct with marie-infarct ischemia, inferolateral defect that corrected on 2018 study, but does not correct now, LVEF 62%, LVEDV 90 mL [de-identified] : 9/28/2020, normal LV function, no pulmonary hypertension, enlarged LA size, moderate mitral regurgitation LVEF 50-55%. \par 5/27/2021, normal LV function, no pulmonary hypertension, normal LA size, minimal MR, LVEF 55-60% [de-identified] : 11/12/2020 with Gio Painting MD diffuse mild-moderate coronary artery disease

## 2022-03-05 NOTE — HISTORY OF PRESENT ILLNESS
[FreeTextEntry1] : Patient is a 76 year-old gentleman with known cardiovascular risk factors of hypertension, non-insulin dependent type II diabetes (needs better control and is planning to establish care with endocrinologist), and ESRD on HD (Tues-Thurs-Sat) who presents today in his usual state of health for evaluation prior to possible renal transplant.\par Patient has no chest pains or shortness of breath.\par Patient does not formally exercise, but he walks regularly and he plays pool. \par Patient used to follow with a cardiologist, but he has not seen him recently, and his most recent ischemic evaluation was approximately five years ago (negative stress test).\par \par July 2020 - Patient returns today in his usual state of health. He continues to have dialysis on Xtixwdn-Zmnnwrkl-Sizylpbr via left forearm AV fistula.\par He and his family have not been sick during the Covid pandemic.\par He does not exercise.\par \par November 2020 - Patient returns today for follow-up after diagnostic left heart catheterization showed diffuse nonobstructive disease. No PCI was performed. \par \par March 2021 - Patient returns today for follow-up. \par He continues to have dialysis via left forearm AV fistula (Wobyulx-Iphfwnak-Aewbjldh).\par He received the first dose of Pfizer Covid vaccine two weeks ago (his second dose is scheduled for next week). \par \par May 2021 - Patient returns today for follow-up.\par His ophthalmologist is concerned about white contoured hemorrhages in both eyes. Patient has no reported fevers or chills. He saw him last week and has requested that we send blood cultures and check an echocardiogram.\par He continues to have HD via left radial AV fistula (Prqfosu-Akktiana-Oecapcvd).\par He has completed his vaccine series for Covid-19 (Pfizer).\par \par June 2021 - Patient returns today for follow-up. His echocardiogram last week was unchanged from prior and had no evidence of infective endocarditis or source of emboli (the ophthalmologist was concerned).\par His ophthalmologist is concerned about white contoured hemorrhages in both eyes. Patient has no reported fevers or chills. He saw him last week and has requested that we send blood cultures and check an echocardiogram.\par He continues to have HD via left radial AV fistula (Fgfvmau-Yjwkpcqh-Klninrik).\par At the time of his last visit, he was started on Isordil 10 mg TID. He does not take it on dialysis days. Even on non-dialysis days, he finds that the medication makes him feel weak and tired.\par \par PMD: Isabel Martinez MD [phone]\par Nephrologist: Kodi Olivares MD [phone]\par Endocrinologist: \par Ophthalmologist: Kieran Gifford MD [phone]

## 2022-03-05 NOTE — REASON FOR VISIT
[Other: ____] : [unfilled] [FreeTextEntry1] : March 2022 - Patient returns today for follow-up.\par He remains hypertensive despite polypharmacy, and his wife believes that it is because of anger, anxiety. He has been compliant with his current medication regimen, including carvedilol, lisinopril, and isosorbide.

## 2022-06-22 ENCOUNTER — NON-APPOINTMENT (OUTPATIENT)
Age: 77
End: 2022-06-22

## 2022-06-22 ENCOUNTER — APPOINTMENT (OUTPATIENT)
Dept: CARDIOLOGY | Facility: CLINIC | Age: 77
End: 2022-06-22
Payer: MEDICARE

## 2022-06-22 VITALS
HEIGHT: 66 IN | WEIGHT: 175 LBS | SYSTOLIC BLOOD PRESSURE: 150 MMHG | BODY MASS INDEX: 28.12 KG/M2 | DIASTOLIC BLOOD PRESSURE: 74 MMHG | OXYGEN SATURATION: 98 % | HEART RATE: 82 BPM

## 2022-06-22 PROCEDURE — 93000 ELECTROCARDIOGRAM COMPLETE: CPT | Mod: NC

## 2022-06-22 PROCEDURE — 99215 OFFICE O/P EST HI 40 MIN: CPT

## 2022-06-22 NOTE — HISTORY OF PRESENT ILLNESS
[FreeTextEntry1] : Patient is a 77 year-old gentleman with known cardiovascular risk factors of hypertension, non-insulin dependent type II diabetes (needs better control and is planning to establish care with endocrinologist), and ESRD on HD (Tues-Thurs-Sat) who presents today in his usual state of health for evaluation prior to possible renal transplant.\par Patient has no chest pains or shortness of breath.\par Patient does not formally exercise, but he walks regularly and he plays pool. \par Patient used to follow with a cardiologist, but he has not seen him recently, and his most recent ischemic evaluation was approximately five years ago (negative stress test).\par \par July 2020 - Patient returns today in his usual state of health. He continues to have dialysis on Zxpsxjw-Mimqzbnb-Xhwazywa via left forearm AV fistula.\par He and his family have not been sick during the Covid pandemic.\par He does not exercise.\par \par November 2020 - Patient returns today for follow-up after diagnostic left heart catheterization showed diffuse nonobstructive disease. No PCI was performed. \par \par March 2021 - Patient returns today for follow-up. \par He continues to have dialysis via left forearm AV fistula (Xalcdby-Kfesdmao-Puwlhivf).\par He received the first dose of Pfizer Covid vaccine two weeks ago (his second dose is scheduled for next week). \par \par May 2021 - Patient returns today for follow-up.\par His ophthalmologist is concerned about white contoured hemorrhages in both eyes. Patient has no reported fevers or chills. He saw him last week and has requested that we send blood cultures and check an echocardiogram.\par He continues to have HD via left radial AV fistula (Dbniydo-Kbiacbuj-Paicdvzh).\par He has completed his vaccine series for Covid-19 (Pfizer).\par \par June 2021 - Patient returns today for follow-up. His echocardiogram last week was unchanged from prior and had no evidence of infective endocarditis or source of emboli (the ophthalmologist was concerned).\par His ophthalmologist is concerned about white contoured hemorrhages in both eyes. Patient has no reported fevers or chills. He saw him last week and has requested that we send blood cultures and check an echocardiogram.\par He continues to have HD via left radial AV fistula (Evtonns-Qmaythjd-Yuudoguj).\par At the time of his last visit, he was started on Isordil 10 mg TID. He does not take it on dialysis days. Even on non-dialysis days, he finds that the medication makes him feel weak and tired.\par \par March 2022 - Patient returns today for follow-up.\par He remains hypertensive despite polypharmacy, and his wife believes that it is because of anger, anxiety. He has been compliant with his current medication regimen, including carvedilol, lisinopril, and isosorbide.\par \par PMD: Isabel Martinez MD [phone]\par Nephrologist: Kodi Olivares MD [phone]\par Endocrinologist: \par Ophthalmologist: Kieran Gifford MD [phone]

## 2022-06-22 NOTE — CARDIOLOGY SUMMARY
[de-identified] : 8/202/2018, normal, sinus at 70 bpm [de-identified] : 10/19/2020, basal inferolateral defect that partially corrects, suggestive of infarct with marie-infarct ischemia, inferolateral defect that corrected on 2018 study, but does not correct now, LVEF 62%, LVEDV 90 mL [de-identified] : 9/28/2020, normal LV function, no pulmonary hypertension, enlarged LA size, moderate mitral regurgitation LVEF 50-55%. \par 5/27/2021, normal LV function, no pulmonary hypertension, normal LA size, minimal MR, LVEF 55-60% [de-identified] : 11/12/2020 with Gio Painting MD diffuse mild-moderate coronary artery disease

## 2022-06-22 NOTE — REASON FOR VISIT
[Other: ____] : [unfilled] [FreeTextEntry1] : June 2022 - Patient returns today for follow-up. Patient reports feeling much better since carvedilol was increased at his last visit. He also remains on lisinopril and isosorbide.\par He continues taking a loop diuretic. \par He continues to have ESRD on HD via left radial AV fistula (Yvbvzus-Tsynvjkt-Fsgzacvr).\par He remains listed for renal transplant.

## 2022-06-22 NOTE — DISCUSSION/SUMMARY
[EKG obtained to assist in diagnosis and management of assessed problem(s)] : EKG obtained to assist in diagnosis and management of assessed problem(s) [FreeTextEntry1] : Patient is a very pleasant 77 year-old gentleman with no known cardiovascular disease but multiple cardiovascular risk factors as above.\par Echocardiogram and nuclear stress testing in 2018 were normal. Nuclear stress testing was abnormal in 2020. Left heart cath showed diffuse nonobstructive disease. \par May 2021, patient's ophthalmologist noted new hemorrhages in both eyes that were concerning for subacute endocarditis - TTE was unchanged from prior and patient has had no fevers, chills, or neurologic symptoms.\par \par Recommend ongoing risk factor modification, including management of hypertension and diabetes.\par Increase carvedilol to 25 mg BID.\par Continue lisinopril to 40 mg daily.\par Continue isosorbide dinitrate 5 mg TID.\par Continue atorvastatin to 40 mg daily for patient with atherosclerotic coronary disease and diabetes.\par Discontinued amlodipine 5 mg daily due to pedal edema. It resolved with stopping the medication.\par \par Follow-up repeat TTE to evaluate for any new cardiomyopathy. \par No other cardiovascular testing is necessary prior to possible renal transplant.

## 2022-08-10 ENCOUNTER — NON-APPOINTMENT (OUTPATIENT)
Age: 77
End: 2022-08-10

## 2022-08-10 ENCOUNTER — RESULT REVIEW (OUTPATIENT)
Age: 77
End: 2022-08-10

## 2022-08-22 ENCOUNTER — APPOINTMENT (OUTPATIENT)
Dept: CT IMAGING | Facility: IMAGING CENTER | Age: 77
End: 2022-08-22

## 2022-08-22 ENCOUNTER — APPOINTMENT (OUTPATIENT)
Dept: RADIOLOGY | Facility: IMAGING CENTER | Age: 77
End: 2022-08-22

## 2022-09-08 ENCOUNTER — NON-APPOINTMENT (OUTPATIENT)
Age: 77
End: 2022-09-08

## 2022-10-17 ENCOUNTER — APPOINTMENT (OUTPATIENT)
Dept: CT IMAGING | Facility: IMAGING CENTER | Age: 77
End: 2022-10-17

## 2022-10-17 ENCOUNTER — OUTPATIENT (OUTPATIENT)
Dept: OUTPATIENT SERVICES | Facility: HOSPITAL | Age: 77
LOS: 1 days | End: 2022-10-17
Payer: COMMERCIAL

## 2022-10-17 DIAGNOSIS — Z01.818 ENCOUNTER FOR OTHER PREPROCEDURAL EXAMINATION: ICD-10-CM

## 2022-10-17 DIAGNOSIS — Z90.49 ACQUIRED ABSENCE OF OTHER SPECIFIED PARTS OF DIGESTIVE TRACT: Chronic | ICD-10-CM

## 2022-10-17 PROCEDURE — 71046 X-RAY EXAM CHEST 2 VIEWS: CPT

## 2022-10-17 PROCEDURE — 74177 CT ABD & PELVIS W/CONTRAST: CPT

## 2022-10-17 PROCEDURE — 74177 CT ABD & PELVIS W/CONTRAST: CPT | Mod: 26

## 2022-10-17 PROCEDURE — 71046 X-RAY EXAM CHEST 2 VIEWS: CPT | Mod: 26

## 2022-10-26 ENCOUNTER — APPOINTMENT (OUTPATIENT)
Dept: CARDIOLOGY | Facility: CLINIC | Age: 77
End: 2022-10-26

## 2022-10-26 ENCOUNTER — NON-APPOINTMENT (OUTPATIENT)
Age: 77
End: 2022-10-26

## 2022-10-26 VITALS
SYSTOLIC BLOOD PRESSURE: 120 MMHG | WEIGHT: 171.96 LBS | HEART RATE: 72 BPM | BODY MASS INDEX: 27.64 KG/M2 | OXYGEN SATURATION: 99 % | HEIGHT: 66 IN | DIASTOLIC BLOOD PRESSURE: 69 MMHG

## 2022-10-26 PROCEDURE — 99072 ADDL SUPL MATRL&STAF TM PHE: CPT

## 2022-10-26 PROCEDURE — 99215 OFFICE O/P EST HI 40 MIN: CPT

## 2022-10-26 PROCEDURE — 93306 TTE W/DOPPLER COMPLETE: CPT

## 2022-10-26 PROCEDURE — 93000 ELECTROCARDIOGRAM COMPLETE: CPT | Mod: NC

## 2022-10-26 NOTE — HISTORY OF PRESENT ILLNESS
[FreeTextEntry1] : Patient is a 77 year-old gentleman with known cardiovascular risk factors of hypertension, non-insulin dependent type II diabetes (needs better control and is planning to establish care with endocrinologist), and ESRD on HD (Tues-Thurs-Sat) who presents today in his usual state of health for evaluation prior to possible renal transplant.\par Patient has no chest pains or shortness of breath.\par Patient does not formally exercise, but he walks regularly and he plays pool. \par Patient used to follow with a cardiologist, but he has not seen him recently, and his most recent ischemic evaluation was approximately five years ago (negative stress test).\par \par July 2020 - Patient returns today in his usual state of health. He continues to have dialysis on Tyiaugo-Onbekkae-Keegfqrj via left forearm AV fistula.\par He and his family have not been sick during the Covid pandemic.\par He does not exercise.\par \par November 2020 - Patient returns today for follow-up after diagnostic left heart catheterization showed diffuse nonobstructive disease. No PCI was performed. \par \par March 2021 - Patient returns today for follow-up. \par He continues to have dialysis via left forearm AV fistula (Albqgsp-Gwneufxr-Vqiautqq).\par He received the first dose of Pfizer Covid vaccine two weeks ago (his second dose is scheduled for next week). \par \par May 2021 - Patient returns today for follow-up.\par His ophthalmologist is concerned about white contoured hemorrhages in both eyes. Patient has no reported fevers or chills. He saw him last week and has requested that we send blood cultures and check an echocardiogram.\par He continues to have HD via left radial AV fistula (Hohjpaq-Thnarfdt-Shohvdor).\par He has completed his vaccine series for Covid-19 (Pfizer).\par \par June 2021 - Patient returns today for follow-up. His echocardiogram last week was unchanged from prior and had no evidence of infective endocarditis or source of emboli (the ophthalmologist was concerned).\par His ophthalmologist is concerned about white contoured hemorrhages in both eyes. Patient has no reported fevers or chills. He saw him last week and has requested that we send blood cultures and check an echocardiogram.\par He continues to have HD via left radial AV fistula (Xjpnfcl-Cskcmfmj-Jyztmzth).\par At the time of his last visit, he was started on Isordil 10 mg TID. He does not take it on dialysis days. Even on non-dialysis days, he finds that the medication makes him feel weak and tired.\par \par March 2022 - Patient returns today for follow-up.\par He remains hypertensive despite polypharmacy, and his wife believes that it is because of anger, anxiety. He has been compliant with his current medication regimen, including carvedilol, lisinopril, and isosorbide.\par \par June 2022 - Patient returns today for follow-up. Patient reports feeling much better since carvedilol was increased at his last visit. He also remains on lisinopril and isosorbide.\par He continues taking a loop diuretic. \par He continues to have ESRD on HD via left radial AV fistula (Emavumx-Jqutevnb-Lopfqmcl).\par He remains listed for renal transplant. \par \par PMD: Isabel Martinez MD [phone]\par Nephrologist: Kodi Olivares MD [phone]\par Endocrinologist: \par Ophthalmologist: Kieran Gifford MD [phone]

## 2022-10-26 NOTE — CARDIOLOGY SUMMARY
[de-identified] : 8/202/2018, normal, sinus at 70 bpm [de-identified] : 10/19/2020, basal inferolateral defect that partially corrects, suggestive of infarct with marie-infarct ischemia, inferolateral defect that corrected on 2018 study, but does not correct now, LVEF 62%, LVEDV 90 mL [de-identified] : 9/28/2020, normal LV function, no pulmonary hypertension, enlarged LA size, moderate mitral regurgitation LVEF 50-55%. \par 5/27/2021, normal LV function, no pulmonary hypertension, normal LA size, minimal MR, LVEF 55-60% [de-identified] : 11/12/2020 with Gio Painting MD diffuse mild-moderate coronary artery disease

## 2022-10-26 NOTE — PHYSICAL EXAM
[General Appearance - Well Developed] : well developed [Normal Appearance] : normal appearance [Well Groomed] : well groomed [General Appearance - Well Nourished] : well nourished [No Deformities] : no deformities [General Appearance - In No Acute Distress] : no acute distress [Normal Oral Mucosa] : normal oral mucosa [No Oral Pallor] : no oral pallor [No Oral Cyanosis] : no oral cyanosis [Normal Oropharynx] : normal oropharynx [Respiration, Rhythm And Depth] : normal respiratory rhythm and effort [] : no respiratory distress [Exaggerated Use Of Accessory Muscles For Inspiration] : no accessory muscle use [Auscultation Breath Sounds / Voice Sounds] : lungs were clear to auscultation bilaterally [Bowel Sounds] : normal bowel sounds [Abdomen Soft] : soft [Abdomen Tenderness] : non-tender [Abnormal Walk] : normal gait [Gait - Sufficient For Exercise Testing] : the gait was sufficient for exercise testing [Nail Clubbing] : no clubbing of the fingernails [Cyanosis, Localized] : no localized cyanosis [Skin Color & Pigmentation] : normal skin color and pigmentation [No Venous Stasis] : no venous stasis [No Xanthoma] : no  xanthoma was observed [Oriented To Time, Place, And Person] : oriented to person, place, and time [Impaired Insight] : insight and judgment were intact [Affect] : the affect was normal [Mood] : the mood was normal [No Anxiety] : not feeling anxious [Conjunctiva] : the conjunctiva were normal in both eyes [PERRL] : pupils were equal in size, round, and reactive to light [EOM Intact] : extraocular movements were intact [5th Left ICS - MCL] : palpated at the 5th LICS in the midclavicular line [Normal] : normal [No Precordial Heave] : no precordial heave was noted [Normal Rate] : normal [Rhythm Regular] : regular [Normal S1] : normal S1 [Normal S2] : normal S2 [No Gallop] : no gallop heard [No Murmur] : no murmurs heard [I] : a grade 1 [2+] : right 2+ [No Pitting Edema] : no pitting edema present [FreeTextEntry1] : No JVD [Yellow Sclera (Icteric)] : no scleral icterus was seen [Right Carotid Bruit] : no bruit heard over the right carotid [Left Carotid Bruit] : no bruit heard over the left carotid

## 2022-10-26 NOTE — DISCUSSION/SUMMARY
[EKG obtained to assist in diagnosis and management of assessed problem(s)] : EKG obtained to assist in diagnosis and management of assessed problem(s) [FreeTextEntry1] : Patient is a very pleasant 77 year-old gentleman with no known cardiovascular disease but multiple cardiovascular risk factors as above.\par Echocardiogram and nuclear stress testing in 2018 were normal. Nuclear stress testing was abnormal in 2020. Left heart cath showed diffuse nonobstructive disease. \par May 2021, patient's ophthalmologist noted new hemorrhages in both eyes that were concerning for subacute endocarditis - TTE was unchanged from prior and patient has had no fevers, chills, or neurologic symptoms.\par \par Recommend ongoing risk factor modification, including management of hypertension and diabetes.\par Increase carvedilol to 25 mg BID.\par Continue lisinopril to 40 mg daily.\par Continue isosorbide dinitrate 5 mg TID.\par Continue atorvastatin to 40 mg daily for patient with atherosclerotic coronary disease and diabetes.\par Discontinued amlodipine 5 mg daily due to pedal edema. It resolved with stopping the medication.\par \par Follow-up repeat nuclear stress testing to maintain status on transplant list.

## 2022-10-26 NOTE — REASON FOR VISIT
[Other: ____] : [unfilled] [FreeTextEntry1] : October 2022 - Patient returns today for echocardiogram and follow-up of his transplant list status. He reports a decline in his exertional capacity that he notices while climbing the stairs at home. It has been a gradual change.\par He continues to have ESRD on HD via left radial AV fistula (Gpuadnb-Ikaadbjh-Ribxddux).

## 2022-10-27 ENCOUNTER — NON-APPOINTMENT (OUTPATIENT)
Age: 77
End: 2022-10-27

## 2022-11-14 ENCOUNTER — APPOINTMENT (OUTPATIENT)
Dept: CARDIOLOGY | Facility: CLINIC | Age: 77
End: 2022-11-14

## 2022-11-21 ENCOUNTER — APPOINTMENT (OUTPATIENT)
Dept: ULTRASOUND IMAGING | Facility: IMAGING CENTER | Age: 77
End: 2022-11-21

## 2022-11-30 ENCOUNTER — NON-APPOINTMENT (OUTPATIENT)
Age: 77
End: 2022-11-30

## 2022-12-09 ENCOUNTER — NON-APPOINTMENT (OUTPATIENT)
Age: 77
End: 2022-12-09

## 2022-12-12 NOTE — H&P PST ADULT - PSYCHIATRIC DETAILS
Please refer to the [facility] ultrasound report in Ob Procedures for additional information regarding today's visit normal affect/normal behavior

## 2022-12-21 ENCOUNTER — APPOINTMENT (OUTPATIENT)
Dept: CARDIOLOGY | Facility: CLINIC | Age: 77
End: 2022-12-21

## 2022-12-23 ENCOUNTER — OUTPATIENT (OUTPATIENT)
Dept: OUTPATIENT SERVICES | Facility: HOSPITAL | Age: 77
LOS: 1 days | End: 2022-12-23
Payer: COMMERCIAL

## 2022-12-23 ENCOUNTER — APPOINTMENT (OUTPATIENT)
Dept: ULTRASOUND IMAGING | Facility: IMAGING CENTER | Age: 77
End: 2022-12-23
Payer: COMMERCIAL

## 2022-12-23 DIAGNOSIS — Z00.8 ENCOUNTER FOR OTHER GENERAL EXAMINATION: ICD-10-CM

## 2022-12-23 DIAGNOSIS — Z90.49 ACQUIRED ABSENCE OF OTHER SPECIFIED PARTS OF DIGESTIVE TRACT: Chronic | ICD-10-CM

## 2022-12-23 DIAGNOSIS — Z01.818 ENCOUNTER FOR OTHER PREPROCEDURAL EXAMINATION: ICD-10-CM

## 2022-12-23 PROCEDURE — 76700 US EXAM ABDOM COMPLETE: CPT | Mod: 26

## 2022-12-23 PROCEDURE — 76700 US EXAM ABDOM COMPLETE: CPT

## 2023-01-12 ENCOUNTER — NON-APPOINTMENT (OUTPATIENT)
Age: 78
End: 2023-01-12

## 2023-01-13 ENCOUNTER — APPOINTMENT (OUTPATIENT)
Dept: CARDIOLOGY | Facility: CLINIC | Age: 78
End: 2023-01-13

## 2023-01-13 ENCOUNTER — NON-APPOINTMENT (OUTPATIENT)
Age: 78
End: 2023-01-13

## 2023-01-13 ENCOUNTER — APPOINTMENT (OUTPATIENT)
Dept: CARDIOLOGY | Facility: CLINIC | Age: 78
End: 2023-01-13
Payer: MEDICARE

## 2023-01-13 VITALS
WEIGHT: 172 LBS | SYSTOLIC BLOOD PRESSURE: 127 MMHG | DIASTOLIC BLOOD PRESSURE: 70 MMHG | HEART RATE: 77 BPM | OXYGEN SATURATION: 98 % | BODY MASS INDEX: 27.64 KG/M2 | HEIGHT: 66 IN

## 2023-01-13 PROCEDURE — 93000 ELECTROCARDIOGRAM COMPLETE: CPT

## 2023-01-13 PROCEDURE — 99214 OFFICE O/P EST MOD 30 MIN: CPT

## 2023-01-13 PROCEDURE — 99215 OFFICE O/P EST HI 40 MIN: CPT

## 2023-01-13 NOTE — REASON FOR VISIT
[Other: ____] : [unfilled] [FreeTextEntry1] : January 2023 - Patient returns today for follow-up of his renal transplant status. He was scheduled for a nuclear stress test today, but he refused stress testing and wanted to be seen for a visit instead. He has discontinued his antihypertensive regimen including his carvedilol, lisinopril, and isosorbide. \par He continues to have HD via left arm on Iqxuzjw-Lchoriwb-Iqlrqigc.

## 2023-01-13 NOTE — DISCUSSION/SUMMARY
[FreeTextEntry1] : Patient is a very pleasant 77 year-old gentleman with no known cardiovascular disease but multiple cardiovascular risk factors as above.\par Echocardiogram and nuclear stress testing in 2018 were normal. Nuclear stress testing was abnormal in 2020. Left heart cath showed diffuse nonobstructive disease. \par May 2021, patient's ophthalmologist noted new hemorrhages in both eyes that were concerning for subacute endocarditis - TTE was unchanged from prior and patient has had no fevers, chills, or neurologic symptoms.\par \par Recommend ongoing risk factor modification, including management of hypertension and diabetes.\par Reduce carvedilol from 25 mg BID to 12.5 mg BID due to episodes of hypotension.\par Reduce lisinopril from 40 mg daily to 20 mg daily for episodes of hypotension.\par Discontinue isosorbide dinitrate 5 mg TID.\par Continue atorvastatin to 40 mg daily for patient with atherosclerotic coronary disease and diabetes.\par \par Follow-up repeat nuclear stress testing to maintain status on transplant list.  [EKG obtained to assist in diagnosis and management of assessed problem(s)] : EKG obtained to assist in diagnosis and management of assessed problem(s)

## 2023-01-13 NOTE — CARDIOLOGY SUMMARY
[de-identified] : 8/202/2018, normal, sinus at 70 bpm [de-identified] : 10/19/2020, basal inferolateral defect that partially corrects, suggestive of infarct with marie-infarct ischemia, inferolateral defect that corrected on 2018 study, but does not correct now, LVEF 62%, LVEDV 90 mL [de-identified] : 9/28/2020, normal LV function, no pulmonary hypertension, enlarged LA size, moderate mitral regurgitation LVEF 50-55%. \par 5/27/2021, normal LV function, no pulmonary hypertension, normal LA size, minimal MR, LVEF 55-60%\par 10/26/2022, normal LA size, normal pulmonary pressures, normal LV systolic function, LVEF 55-60% [de-identified] : 11/12/2020 with Gio Painting MD diffuse mild-moderate coronary artery disease

## 2023-01-13 NOTE — HISTORY OF PRESENT ILLNESS
[FreeTextEntry1] : Patient is a 77 year-old gentleman with known cardiovascular risk factors of hypertension, non-insulin dependent type II diabetes (needs better control and is planning to establish care with endocrinologist), and ESRD on HD (Tues-Thurs-Sat) who presents today in his usual state of health for evaluation prior to possible renal transplant.\par Patient has no chest pains or shortness of breath.\par Patient does not formally exercise, but he walks regularly and he plays pool. \par Patient used to follow with a cardiologist, but he has not seen him recently, and his most recent ischemic evaluation was approximately five years ago (negative stress test).\par \par July 2020 - Patient returns today in his usual state of health. He continues to have dialysis on Gskfyri-Nynmzkva-Dzzliqan via left forearm AV fistula.\par He and his family have not been sick during the Covid pandemic.\par He does not exercise.\par \par November 2020 - Patient returns today for follow-up after diagnostic left heart catheterization showed diffuse nonobstructive disease. No PCI was performed. \par \par March 2021 - Patient returns today for follow-up. \par He continues to have dialysis via left forearm AV fistula (Tflpffl-Ypnjjkqv-Couyemfp).\par He received the first dose of Pfizer Covid vaccine two weeks ago (his second dose is scheduled for next week). \par \par May 2021 - Patient returns today for follow-up.\par His ophthalmologist is concerned about white contoured hemorrhages in both eyes. Patient has no reported fevers or chills. He saw him last week and has requested that we send blood cultures and check an echocardiogram.\par He continues to have HD via left radial AV fistula (Tpavwfn-Tcogjmbf-Hkygepoq).\par He has completed his vaccine series for Covid-19 (Pfizer).\par \par June 2021 - Patient returns today for follow-up. His echocardiogram last week was unchanged from prior and had no evidence of infective endocarditis or source of emboli (the ophthalmologist was concerned).\par His ophthalmologist is concerned about white contoured hemorrhages in both eyes. Patient has no reported fevers or chills. He saw him last week and has requested that we send blood cultures and check an echocardiogram.\par He continues to have HD via left radial AV fistula (Oidrzlt-Rilfgatw-Htkjovoa).\par At the time of his last visit, he was started on Isordil 10 mg TID. He does not take it on dialysis days. Even on non-dialysis days, he finds that the medication makes him feel weak and tired.\par \par March 2022 - Patient returns today for follow-up.\par He remains hypertensive despite polypharmacy, and his wife believes that it is because of anger, anxiety. He has been compliant with his current medication regimen, including carvedilol, lisinopril, and isosorbide.\par \par June 2022 - Patient returns today for follow-up. Patient reports feeling much better since carvedilol was increased at his last visit. He also remains on lisinopril and isosorbide.\par He continues taking a loop diuretic. \par He continues to have ESRD on HD via left radial AV fistula (Sssaess-Eyngoano-Kfgmdkjj).\par He remains listed for renal transplant. \par \par October 2022 - Patient returns today for echocardiogram and follow-up of his transplant list status. He reports a decline in his exertional capacity that he notices while climbing the stairs at home. It has been a gradual change.\par He continues to have ESRD on HD via left radial AV fistula (Ikkufce-Thedpefu-Flmzhivx).\par \par PMD: Isabel Martinez MD [phone]\par Nephrologist: Kodi Olivares MD [phone]\par Endocrinologist: \par Ophthalmologist: Kieran Gifford MD [phone]

## 2023-02-01 ENCOUNTER — APPOINTMENT (OUTPATIENT)
Dept: CARDIOLOGY | Facility: CLINIC | Age: 78
End: 2023-02-01

## 2023-02-01 ENCOUNTER — APPOINTMENT (OUTPATIENT)
Dept: CARDIOLOGY | Facility: CLINIC | Age: 78
End: 2023-02-01
Payer: COMMERCIAL

## 2023-02-01 PROCEDURE — 99072 ADDL SUPL MATRL&STAF TM PHE: CPT

## 2023-02-01 PROCEDURE — 78452 HT MUSCLE IMAGE SPECT MULT: CPT

## 2023-02-01 PROCEDURE — A9500: CPT

## 2023-02-01 PROCEDURE — 93015 CV STRESS TEST SUPVJ I&R: CPT

## 2023-02-10 ENCOUNTER — LABORATORY RESULT (OUTPATIENT)
Age: 78
End: 2023-02-10

## 2023-02-10 ENCOUNTER — APPOINTMENT (OUTPATIENT)
Dept: NEPHROLOGY | Facility: CLINIC | Age: 78
End: 2023-02-10
Payer: COMMERCIAL

## 2023-02-10 ENCOUNTER — NON-APPOINTMENT (OUTPATIENT)
Age: 78
End: 2023-02-10

## 2023-02-10 VITALS
HEART RATE: 76 BPM | DIASTOLIC BLOOD PRESSURE: 75 MMHG | HEIGHT: 66 IN | OXYGEN SATURATION: 97 % | WEIGHT: 172 LBS | BODY MASS INDEX: 27.64 KG/M2 | RESPIRATION RATE: 16 BRPM | SYSTOLIC BLOOD PRESSURE: 147 MMHG

## 2023-02-10 PROCEDURE — 99072 ADDL SUPL MATRL&STAF TM PHE: CPT

## 2023-02-10 PROCEDURE — 99204 OFFICE O/P NEW MOD 45 MIN: CPT

## 2023-02-10 NOTE — PLAN
[FreeTextEntry1] : 1. ESRD on dialysis since 2018-   Mr. GISELA MORALES  Is a fair candidate for kidney transplantation and would benefit from transplantation . he is status 7 pending dental clearance. \par \par 2.Cardiac risk- 0/26/2022  transthoracic echocardiogram which revealed normal left ventricular systolic function, diastolic LV dysfunction, no significant valve abnormalities, normal pulmonary pressures with an LVEF of 55-60%.\par \par On 2/1/2023 he had a nuclear stress test which revealed a small, mild defect in the mid to distal inferolateral wall that is reversible and consistent with ischemia. LVEF 70%. LVEDV 86 mL. \par When compared to prior study of 10/19/2020, there is no change. \par \par Study of 10/19/2020 was followed by left heart catheterization which showed calcified coronaries with diffuse mid to distal LAD disease post large diagonal and moderate disease of distal codominant RCA.\par No further cardiac testing is required prior to transplant consideration. \par \par \par 3. Cancer screening:  colonoscopy form 2017 , results reviewed . \par 4. ID: update serology for acute and chronic viral infections. Screening for latent TB\par 5. Imaging from 2022 , reviewed and wnl\par \par \par \par \par I have personally discussed the risks and benefits of transplantation and patient attended transplant education class where the following was disclosed:\par  \par Reviewed factors affecting survival and morbidity while on dialysis, the transplant wait list and reviewed marie-operative and long-term risk factors affecting outcome in kidney transplantation. \par  \par One year SRTR outcomes for national and [facility] were discussed in regards to patient survival and graft survival after transplantation. \par  \par Details of transplant surgery, including complications were discussed.\par Immunosuppression and complications including infection including life threatening sepsis and opportunistic infections, malignancy and new onset diabetes were discussed. \par  \par Benefits of live donor transplantation as well as variability in wait times across regions and multiple listing were discussed. \par KDPI >85% and PHS high risk criteria donors were discussed. \par HCV kidney transplantation was discussed.\par  \par Will proceed with completing/ updating work up and listing for transplant/ live donor transplant once work up is reviewed and found to be acceptable by multidisciplinary listing committee.\par

## 2023-02-10 NOTE — HISTORY OF PRESENT ILLNESS
[TextBox_42] : 77 years old Filipino man, living in  since 1970. Patient has known CKD (), ESRD (2018) on follow up with / Dr. Olivares is here for a f/u for kidney transplant evaluation. \par He has known DM (age 57) not on insulin; HTN (age 57).\par No known h/o kidney stone/ Prostatism.\par No hematuria/Nocturia/Transfusions\par Urine out put: 1-2 cups/day\par Has no h/o UTI. Pneumonia 25 years ago, was not hospitalized\par No known h/o active CAD/CVA/PVD/DVT/neoplasia/active infections/bleeding.\par No illness or hospitalization in the last 6 months. \par Past surgeries:\par Appendectomy, at age 20; Left forearm AVG, Dr. Retana and Dr. Montes De Oca.\par No history of kidney/ bladder/ prostate surgery.\par Non smoker. Quit 40 years ago, 1/2 ppd for 5 years.\par Fam: Parents are . Father - CAD Mother- stomach cancer Siblings-brother had stomach cancer.\par Children: 43 years old son,  , [employer] graduate , lives in [place]. Healthy. \par Has family history of kidney disease- sister is on dialysis, not diabetic, lives in [place]. She is on dialysis after losing transplant after 14 years (from daughter).(Padmini Waters, transplanted at [facility])\par \par Functional status - Independent for ADL, Able to walk 3-4 blocks and can climb stairs without difficulty.\par ROS: Has no h/o shortness of breath on exertion.\par Functional/employment status: Retired, used to work in [employer] in [place], as a  for dresses and also as building . 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 Blood pressure normal at dialysis. \par \par \par \par \par \par \par \par \par \par

## 2023-02-13 LAB
ABO + RH PNL BLD: NORMAL
ALBUMIN SERPL ELPH-MCNC: 4.8 G/DL
ALP BLD-CCNC: 138 U/L
ALT SERPL-CCNC: 44 U/L
ANION GAP SERPL CALC-SCNC: 16 MMOL/L
AST SERPL-CCNC: 34 U/L
BASOPHILS # BLD AUTO: 0.04 K/UL
BASOPHILS NFR BLD AUTO: 0.7 %
BILIRUB SERPL-MCNC: 0.6 MG/DL
BUN SERPL-MCNC: 54 MG/DL
C PEPTIDE SERPL-MCNC: 18.5 NG/ML
CALCIUM SERPL-MCNC: 9.6 MG/DL
CHLORIDE SERPL-SCNC: 98 MMOL/L
CMV IGG SERPL QL: 2.6 U/ML
CMV IGG SERPL-IMP: POSITIVE
CO2 SERPL-SCNC: 28 MMOL/L
COVID-19 SPIKE DOMAIN ANTIBODY INTERPRETATION: POSITIVE
CREAT SERPL-MCNC: 8.5 MG/DL
EBV DNA SERPL NAA+PROBE-ACNC: NOT DETECTED IU/ML
EBV EA AB SER IA-ACNC: <5 U/ML
EBV EA AB TITR SER IF: POSITIVE
EBV EA IGG SER QL IA: >600 U/ML
EBV EA IGG SER-ACNC: NEGATIVE
EBV EA IGM SER IA-ACNC: NEGATIVE
EBV PATRN SPEC IB-IMP: NORMAL
EBV VCA IGG SER IA-ACNC: >750 U/ML
EBV VCA IGM SER QL IA: <10 U/ML
EBVPCR LOG: NOT DETECTED LOG10IU/ML
EGFR: 6 ML/MIN/1.73M2
EOSINOPHIL # BLD AUTO: 0.18 K/UL
EOSINOPHIL NFR BLD AUTO: 3.2 %
EPSTEIN-BARR VIRUS CAPSID ANTIGEN IGG: POSITIVE
ESTIMATED AVERAGE GLUCOSE: 131 MG/DL
GLUCOSE SERPL-MCNC: 119 MG/DL
HAV IGM SER QL: NONREACTIVE
HBA1C MFR BLD HPLC: 6.2 %
HBV CORE IGG+IGM SER QL: NONREACTIVE
HBV SURFACE AB SER QL: REACTIVE
HBV SURFACE AB SERPL IA-ACNC: 36.8 MIU/ML
HBV SURFACE AG SER QL: NONREACTIVE
HCT VFR BLD CALC: 35.8 %
HCV AB SER QL: NONREACTIVE
HCV S/CO RATIO: 0.06 S/CO
HGB BLD-MCNC: 11.4 G/DL
HIV1+2 AB SPEC QL IA.RAPID: NONREACTIVE
HSV 1+2 IGG SER IA-IMP: NEGATIVE
HSV 1+2 IGG SER IA-IMP: POSITIVE
HSV1 IGG SER QL: 32.9 INDEX
HSV2 IGG SER QL: 0.16 INDEX
IMM GRANULOCYTES NFR BLD AUTO: 0.4 %
LYMPHOCYTES # BLD AUTO: 1.26 K/UL
LYMPHOCYTES NFR BLD AUTO: 22.7 %
MAGNESIUM SERPL-MCNC: 2.5 MG/DL
MAN DIFF?: NORMAL
MCHC RBC-ENTMCNC: 31.8 GM/DL
MCHC RBC-ENTMCNC: 33.6 PG
MCV RBC AUTO: 105.6 FL
MONOCYTES # BLD AUTO: 0.51 K/UL
MONOCYTES NFR BLD AUTO: 9.2 %
NEUTROPHILS # BLD AUTO: 3.54 K/UL
NEUTROPHILS NFR BLD AUTO: 63.8 %
PHOSPHATE SERPL-MCNC: 4.8 MG/DL
PLATELET # BLD AUTO: 196 K/UL
POTASSIUM SERPL-SCNC: 7.1 MMOL/L
PROT SERPL-MCNC: 7.4 G/DL
PSA SERPL-MCNC: 1.3 NG/ML
RBC # BLD: 3.39 M/UL
RBC # FLD: 14.6 %
RUBV IGG FLD-ACNC: 19 INDEX
RUBV IGG SER-IMP: POSITIVE
SARS-COV-2 AB SERPL IA-ACNC: >250 U/ML
SODIUM SERPL-SCNC: 142 MMOL/L
T GONDII AB SER-IMP: POSITIVE
T GONDII IGG SER QL: 32.4 IU/ML
T PALLIDUM AB SER QL IA: NEGATIVE
URATE SERPL-MCNC: 4.4 MG/DL
VZV AB TITR SER: POSITIVE
VZV IGG SER IF-ACNC: 1801 INDEX
WBC # FLD AUTO: 5.55 K/UL

## 2023-02-15 ENCOUNTER — NON-APPOINTMENT (OUTPATIENT)
Age: 78
End: 2023-02-15

## 2023-04-26 ENCOUNTER — APPOINTMENT (OUTPATIENT)
Dept: CARDIOLOGY | Facility: CLINIC | Age: 78
End: 2023-04-26

## 2023-07-27 ENCOUNTER — NON-APPOINTMENT (OUTPATIENT)
Age: 78
End: 2023-07-27

## 2023-09-11 ENCOUNTER — APPOINTMENT (OUTPATIENT)
Dept: CARDIOLOGY | Facility: CLINIC | Age: 78
End: 2023-09-11

## 2023-10-02 ENCOUNTER — APPOINTMENT (OUTPATIENT)
Dept: CARDIOLOGY | Facility: CLINIC | Age: 78
End: 2023-10-02
Payer: COMMERCIAL

## 2023-10-02 ENCOUNTER — NON-APPOINTMENT (OUTPATIENT)
Age: 78
End: 2023-10-02

## 2023-10-02 VITALS
BODY MASS INDEX: 28.77 KG/M2 | DIASTOLIC BLOOD PRESSURE: 90 MMHG | WEIGHT: 179 LBS | HEART RATE: 82 BPM | OXYGEN SATURATION: 98 % | SYSTOLIC BLOOD PRESSURE: 188 MMHG | HEIGHT: 66 IN

## 2023-10-02 DIAGNOSIS — N18.6 END STAGE RENAL DISEASE: ICD-10-CM

## 2023-10-02 DIAGNOSIS — Z99.2 END STAGE RENAL DISEASE: ICD-10-CM

## 2023-10-02 PROCEDURE — 93000 ELECTROCARDIOGRAM COMPLETE: CPT

## 2023-10-02 PROCEDURE — 99215 OFFICE O/P EST HI 40 MIN: CPT

## 2024-02-16 ENCOUNTER — APPOINTMENT (OUTPATIENT)
Dept: NEPHROLOGY | Facility: CLINIC | Age: 79
End: 2024-02-16

## 2024-02-23 NOTE — ASU PATIENT PROFILE, ADULT - BLOOD AVOIDANCE/RESTRICTIONS, PROFILE
INPATIENT NEPHROLOGY CONSULT PROGRESS NOTE      Patient Name: Demetria Enriquez MRN: [id number]  DATE of SERVICE: February 23, 2024  TIME of SERVICE: 6:16 PM  CONSULTING SERVICE: Nephrology    REASON for CONSULT: SONIA    SUBJECTIVE:  Seen and evaluated at bedside, feeling better   Had liquid stool today     Ms. Enriquez is a 77 y.o. female who presented to [facility] February 22, 2024 for evaluation of diarrhea.  Patient reports that diarrhea started February 13, 2024 about 3-4 bowel movements a day diarrhea associated with sharp lower left abdominal pain.  Patient with past medical history significant for relatively preserved kidney function, hypothyroidism, migraine headache, depression, arthritis, hyperlipidemia.   Labs demonstrated acute kidney injury with serum creatinine up to 2.89 mg deciliter from baseline creatinine of 0.5 mg/dL, GFR of 69 mill per minute from baseline GFR more than 90.  Azotemia with BUN of 52, hyponatremia sodium of 130 hypercalcemia calcium 10.6  Kidney ultrasound without hydronephrosis.  Right kidney cyst noted.  Patient had a CT scan #17 2020 hepatic lobe cyst stable defect in the midline abdominal wall 4.9 x 2.2 cm and 6 x 2.1 cm fat-containing anterior abdominal hernias.  Moderate to large amount of stool throughout the colon also evidence of fecalization of the small bowel loop  Fracture of the right superior pubic ramus subacute fracture of right inferior pubic ramus.  Bilateral subacute sacral kelly fracture with sclerosis of the sacrum.        ASSESSMENT AND PLAN:  Acute kidney injury in the setting of profound diarrhea for 2 weeks:  -- SONIA likely prerenal due to dehydration, NSAIDs related, analgesic nephropathy  --Admission serum creatinine to 2.9 mg deciliter with drop in GFR to 16 ml  --No uremic symptoms  -- No signs of obstruction on renal ultrasound  -- SPEP pending      Diarrhea:  Concerning for overflow  incontinent  Recent CT scan December 17 demonstrated severe constipation  Infectious workup in process     Right kidney cyst appears to be benign     Volume: Dehydration, hypovolemia     Electrolytes: Hyponatremia likely hypovolemic     Proteinuria detected on urinalysis to quantify protein to creatinine ratio     Hypercalcemia likely secondary to dehydration     Associated conditions: Patient with history of urinary retention, anxiety, migraine headache, GERD, hypothyroidism.     Plan:  Acute kidney injury in the setting of acute diarrhea likely prerenal secondary to dehydration,   Send images demonstrated severe constipation which raises a concern for diarrhea related to overflow; workup for infectious disease in process: improving     Scr 2.5 mg/dl, BUN 47, egfr 19.   No uremic sx.      To cont IV fluid to  mL/h and adjust based on insensible losses.  To Continue to avoid NSAIDs.     Unclear why patient has pubic ramus and sacral fractures, anemia, hypercalcemia: To rule out plasma cell disorder. Pending SPEP      protein to creatinine ratio pending      To Continue to replace potassium and magnesium in the setting of GI loss     Medication reviewed, adjusted we will continue to monitor closely with you, thank you!    Medications:    Current Facility-Administered Medications:     ALPRAZolam (Xanax) tablet 0.25 mg, 0.25 mg, oral, Daily PRN, Nelli Finley PA-C    busPIRone (Buspar) tablet 10 mg, 10 mg, oral, BID, Nelli Finley PA-C, 10 mg at 02/23/24 0917    citalopram (CeleXA) tablet 20 mg, 20 mg, oral, Daily, Nelli Finley PA-C, 20 mg at 02/23/24 0917    hydrOXYzine HCL (Atarax) tablet 50 mg, 50 mg, oral, Nightly, Nelli Finley PA-C, 50 mg at 02/22/24 2109    lactated Ringer's infusion, 100 mL/hr, intravenous, Continuous, Lindsay Cortez MD, Last Rate: 100 mL/hr at 02/23/24 1737, 100 mL/hr at 02/23/24 1737    levothyroxine (Synthroid, Levoxyl) tablet 50 mcg, 50 mcg, oral, Daily, Nelli Finley PA-C,  50 mcg at 02/23/24 0618    magnesium oxide (Mag-Ox) tablet 400 mg, 400 mg, oral, Daily, OSCAR Cramer-C, 400 mg at 02/23/24 0917    melatonin tablet 6 mg, 6 mg, oral, Nightly, Nelli Finley, PA-C, 6 mg at 02/22/24 2109    ondansetron ODT (Zofran-ODT) disintegrating tablet 4 mg, 4 mg, oral, q8h PRN **OR** ondansetron (Zofran) injection 4 mg, 4 mg, intravenous, q8h PRN, Nelli Finley PA-C    pantoprazole (ProtoNix) EC tablet 40 mg, 40 mg, oral, Daily before breakfast, OSCAR Cramer-C, 40 mg at 02/23/24 0618    polyethylene glycol (Glycolax, Miralax) packet 17 g, 17 g, oral, Daily, OSCAR Cramer-C    propranolol LA (Inderal LA) 24 hr capsule 60 mg, 60 mg, oral, Daily, Nelli Finley, PA-C, 60 mg at 02/23/24 0917    simvastatin (Zocor) tablet 20 mg, 20 mg, oral, Nightly, Nelli Finley PA-C, 20 mg at 02/22/24 2109    tamsulosin (Flomax) 24 hr capsule 0.8 mg, 0.8 mg, oral, Nightly, Nelli Finley, PA-C, 0.8 mg at 02/22/24 2109    PERTINENT ROS:  GENERAL:  positive for fatigue, poor appetite.  No fever/chills  RESPIRATORY:  positive for shortness of breath.  Negative for cough, wheezing.  CARDIOVASCULAR:   Negative for chest pain or palpitation.  GI:  + for abdominal pain, diarrhea, heartburn, nausea, vomiting  : negative for dysuria, hematuria    Physical Exam:  Vital signs in last 24 hours:  Temp:  [35.8 °C (96.4 °F)-36.6 °C (97.9 °F)] 36.1 °C (97 °F)  Heart Rate:  [72-94] 77  Resp:  [15-18] 16  BP: (101-145)/(56-83) 112/65    General: Awake, cooperative, not in acute distress  HEENT:  NCAT,  mucous membranes moist and pink  NECK:  no JVD, no carotid bruit, supple, no cervical mass or thyromegaly  LUNGS;  Diminished breath sounds, no Rales  CV:  Distant, regular rate and rhythm, no murmurs  ABDOMEN:  abdomen soft, nontender, BS normal, no masses or organomegaly  EDEMA:  no lower extremity edema/dependent edema  SKIN:  dry and normal turgor, no clubbing, cyanosis or petechia.  No rashes  noted      Intake/Output last 3 shifts:  I/O last 3 completed shifts:  In: 2102.5 (37.1 mL/kg) [P.O.:240; I.V.:862.5 (15.2 mL/kg); IV Piggyback:1000]  Out: - (0 mL/kg)   Weight: 56.7 kg     DATA:  Diagnotic tests reviewed for Todays visit:  Results from last 7 days   Lab Units 02/23/24  0536   WBC AUTO x10*3/uL 6.2   RBC AUTO x10*6/uL 3.33*   HEMOGLOBIN g/dL 9.2*   HEMATOCRIT % 30.2*     Results from last 7 days   Lab Units 02/23/24  0537 02/22/24  0929   SODIUM mmol/L 132* 130*   POTASSIUM mmol/L 3.8 3.8   CHLORIDE mmol/L 104 98   CO2 mmol/L 18* 21   BUN mg/dL 47* 52*   CREATININE mg/dL 2.58* 2.98*   CALCIUM mg/dL 9.5 10.6*   MAGNESIUM mg/dL  --  2.26   BILIRUBIN TOTAL mg/dL  --  0.3   ALT U/L  --  7   AST U/L  --  16     Results from last 7 days   Lab Units 02/22/24  1343   COLOR U  Yellow   APPEARANCE U  Hazy*   PH U  5.0   SPEC GRAV UR  1.016   PROTEIN U mg/dL 30 (1+)*   BLOOD UR  NEGATIVE   NITRITE U  NEGATIVE   WBC UR /HPF 1-5   BACTERIA UR /HPF 1+*     IMAGING: CXR reviewed in  images      SIGNATURE: Lindsay Cortez MD  Nephrology and Hypertension  [address] [place]. [address]  Office phone: [phone]  FAX: [phone]    This note was partially generated using the Dragon voice recognition system, and there may be some incorrect words, spelling's and punctuation that were not noted in checking the note before saving.       none

## 2024-04-01 ENCOUNTER — NON-APPOINTMENT (OUTPATIENT)
Age: 79
End: 2024-04-01

## 2024-04-01 ENCOUNTER — APPOINTMENT (OUTPATIENT)
Dept: CARDIOLOGY | Facility: CLINIC | Age: 79
End: 2024-04-01
Payer: MEDICARE

## 2024-04-01 VITALS
WEIGHT: 178.13 LBS | HEIGHT: 66 IN | DIASTOLIC BLOOD PRESSURE: 80 MMHG | HEART RATE: 74 BPM | SYSTOLIC BLOOD PRESSURE: 172 MMHG | BODY MASS INDEX: 28.63 KG/M2 | OXYGEN SATURATION: 98 %

## 2024-04-01 DIAGNOSIS — I25.10 ATHEROSCLEROTIC HEART DISEASE OF NATIVE CORONARY ARTERY W/OUT ANGINA PECTORIS: ICD-10-CM

## 2024-04-01 PROCEDURE — 99215 OFFICE O/P EST HI 40 MIN: CPT

## 2024-04-01 PROCEDURE — 93000 ELECTROCARDIOGRAM COMPLETE: CPT

## 2024-04-01 PROCEDURE — G2211 COMPLEX E/M VISIT ADD ON: CPT

## 2024-04-01 NOTE — CARDIOLOGY SUMMARY
[de-identified] : 8/202/2018, normal, sinus at 70 bpm [de-identified] : 10/19/2020, basal inferolateral defect that partially corrects, suggestive of infarct with marie-infarct ischemia, inferolateral defect that corrected on 2018 study, but does not correct now, LVEF 62%, LVEDV 90 mL 2/1/2023 pharmacologic nuclear stress test - small mild defect in mid-distal inferolateral wall that is reversible, LVEF 70%, LVEDV 86 mL [de-identified] : 9/28/2020, normal LV function, no pulmonary hypertension, enlarged LA size, moderate mitral regurgitation LVEF 50-55%.  5/27/2021, normal LV function, no pulmonary hypertension, normal LA size, minimal MR, LVEF 55-60% 10/26/2022, normal LA size, normal pulmonary pressures, normal LV systolic function, LVEF 55-60% [de-identified] : 11/12/2020 with Gio Painting MD diffuse mild-moderate coronary artery disease

## 2024-04-01 NOTE — HISTORY OF PRESENT ILLNESS
[FreeTextEntry1] : Patient is a 78 year-old gentleman with known cardiovascular risk factors of hypertension, non-insulin dependent type II diabetes (needs better control and is planning to establish care with endocrinologist), and ESRD on HD (Tues-Thurs-Sat) who presents today in his usual state of health for evaluation prior to possible renal transplant. Patient has no chest pains or shortness of breath. Patient does not formally exercise, but he walks regularly and he plays pool.  Patient used to follow with a cardiologist, but he has not seen him recently, and his most recent ischemic evaluation was approximately five years ago (negative stress test).  July 2020 - Patient returns today in his usual state of health. He continues to have dialysis on Tuesday-Thursday-Saturday via left forearm AV fistula. He and his family have not been sick during the Covid pandemic. He does not exercise.  November 2020 - Patient returns today for follow-up after diagnostic left heart catheterization showed diffuse nonobstructive disease. No PCI was performed.   March 2021 - Patient returns today for follow-up.  He continues to have dialysis via left forearm AV fistula (Tuesday-Thursday-Saturday). He received the first dose of Pfizer Covid vaccine two weeks ago (his second dose is scheduled for next week).   May 2021 - Patient returns today for follow-up. His ophthalmologist is concerned about white contoured hemorrhages in both eyes. Patient has no reported fevers or chills. He saw him last week and has requested that we send blood cultures and check an echocardiogram. He continues to have HD via left radial AV fistula (Tuesday-Thursday-Saturday). He has completed his vaccine series for Covid-19 (Pfizer).  June 2021 - Patient returns today for follow-up. His echocardiogram last week was unchanged from prior and had no evidence of infective endocarditis or source of emboli (the ophthalmologist was concerned). His ophthalmologist is concerned about white contoured hemorrhages in both eyes. Patient has no reported fevers or chills. He saw him last week and has requested that we send blood cultures and check an echocardiogram. He continues to have HD via left radial AV fistula (Tuesday-Thursday-Saturday). At the time of his last visit, he was started on Isordil 10 mg TID. He does not take it on dialysis days. Even on non-dialysis days, he finds that the medication makes him feel weak and tired.  March 2022 - Patient returns today for follow-up. He remains hypertensive despite polypharmacy, and his wife believes that it is because of anger, anxiety. He has been compliant with his current medication regimen, including carvedilol, lisinopril, and isosorbide.  June 2022 - Patient returns today for follow-up. Patient reports feeling much better since carvedilol was increased at his last visit. He also remains on lisinopril and isosorbide. He continues taking a loop diuretic.  He continues to have ESRD on HD via left radial AV fistula ([id number]-Wpnwplck-Htgrfweo). He remains listed for renal transplant.   October 2022 - Patient returns today for echocardiogram and follow-up of his transplant list status. He reports a decline in his exertional capacity that he notices while climbing the stairs at home. It has been a gradual change. He continues to have ESRD on HD via left radial AV fistula ([id number]-Umaxwaem-Llncedss).  January 2023 - Patient returns today for follow-up of his renal transplant status. He was scheduled for a nuclear stress test today, but he refused stress testing and wanted to be seen for a visit instead. He has discontinued his antihypertensive regimen including his carvedilol, lisinopril, and isosorbide.  He continues to have HD via left arm on Ndgnqbq-Lzfqnxno-Gaowpxmb.  September 2023 - Patient returns today for follow-up of his renal transplant list status. About six weeks ago, he ran out of his SSRI, and snice then, his blood pressure is very elevated. He has been doubling his beta-blocker, ACEi, and nitrate. He continues to have ESRD on HD via left radial AV fistula (Trixrwy-Bzxwhzhm-Dtqjyhao).  PMD: Isabel Martinez MD [phone] Nephrologist: Kodi Olivares MD [phone] Endocrinologist:  Ophthalmologist: Kieran Gifford MD [phone]

## 2024-04-01 NOTE — DISCUSSION/SUMMARY
[EKG obtained to assist in diagnosis and management of assessed problem(s)] : EKG obtained to assist in diagnosis and management of assessed problem(s) [FreeTextEntry1] : Patient is a very pleasant 78 year-old gentleman with no known cardiovascular disease but multiple cardiovascular risk factors as above.  ESRD on HD.  Echocardiogram and nuclear stress testing in 2018 were normal. Nuclear stress testing was abnormal in 2020. Left heart cath showed diffuse nonobstructive disease. Repeat nuclear stress testing in February 2023 was unchanged from prior.  May 2021, patient's ophthalmologist noted new hemorrhages in both eyes that were concerning for subacute endocarditis - TTE was unchanged from prior and patient has had no fevers, chills, or neurologic symptoms.  Recommend ongoing risk factor modification, including management of hypertension and diabetes. Continue carvedilol 25 mg BID. Continue lisinopril 40 mg daily. Continue isosorbide dinitrate 5 mg TID. Continue atorvastatin to 40 mg daily for patient with atherosclerotic coronary disease and diabetes.  Restart SSRI for depression/anxiety. His wife agrees this made a big difference for him.  Will repeat TTE and nuclear stress test for renal transplant list maintenance.

## 2024-04-01 NOTE — PHYSICAL EXAM
[General Appearance - Well Developed] : well developed [Normal Appearance] : normal appearance [Well Groomed] : well groomed [General Appearance - Well Nourished] : well nourished [No Deformities] : no deformities [General Appearance - In No Acute Distress] : no acute distress [Normal Oral Mucosa] : normal oral mucosa [No Oral Pallor] : no oral pallor [No Oral Cyanosis] : no oral cyanosis [Normal Oropharynx] : normal oropharynx [] : no respiratory distress [Respiration, Rhythm And Depth] : normal respiratory rhythm and effort [Exaggerated Use Of Accessory Muscles For Inspiration] : no accessory muscle use [Auscultation Breath Sounds / Voice Sounds] : lungs were clear to auscultation bilaterally [Bowel Sounds] : normal bowel sounds [Abdomen Soft] : soft [Abdomen Tenderness] : non-tender [Abnormal Walk] : normal gait [Gait - Sufficient For Exercise Testing] : the gait was sufficient for exercise testing [Nail Clubbing] : no clubbing of the fingernails [Cyanosis, Localized] : no localized cyanosis [Skin Color & Pigmentation] : normal skin color and pigmentation [No Venous Stasis] : no venous stasis [No Xanthoma] : no  xanthoma was observed [Oriented To Time, Place, And Person] : oriented to person, place, and time [Impaired Insight] : insight and judgment were intact [Affect] : the affect was normal [Mood] : the mood was normal [No Anxiety] : not feeling anxious [Conjunctiva] : the conjunctiva were normal in both eyes [PERRL] : pupils were equal in size, round, and reactive to light [EOM Intact] : extraocular movements were intact [5th Left ICS - MCL] : palpated at the 5th LICS in the midclavicular line [Normal] : normal [No Precordial Heave] : no precordial heave was noted [Normal Rate] : normal [Rhythm Regular] : regular [Normal S1] : normal S1 [Normal S2] : normal S2 [No Murmur] : no murmurs heard [No Gallop] : no gallop heard [I] : a grade 1 [2+] : right 2+ [No Pitting Edema] : no pitting edema present [FreeTextEntry1] : No JVD [Yellow Sclera (Icteric)] : no scleral icterus was seen [Right Carotid Bruit] : no bruit heard over the right carotid [Left Carotid Bruit] : no bruit heard over the left carotid

## 2024-04-01 NOTE — REASON FOR VISIT
[Other: ____] : [unfilled] [FreeTextEntry1] : April 2024 - Patient returns today for follow-up of his renal transplant list status. He continues to have dialysis Szllyjo-Jzgmphzb-Bdgrmfvd via left radial AV fistula.  He does not exercise, but he climbs from his basement to his top floor (two flights of stairs) approximately 10 times per day without symptoms.

## 2024-04-16 ENCOUNTER — TRANSCRIPTION ENCOUNTER (OUTPATIENT)
Age: 79
End: 2024-04-16

## 2024-04-17 ENCOUNTER — INPATIENT (INPATIENT)
Facility: HOSPITAL | Age: 79
LOS: 4 days | Discharge: HOME | DRG: 652 | End: 2024-04-22
Attending: TRANSPLANT SURGERY | Admitting: TRANSPLANT SURGERY
Payer: COMMERCIAL

## 2024-04-17 ENCOUNTER — APPOINTMENT (OUTPATIENT)
Dept: TRANSPLANT | Facility: HOSPITAL | Age: 79
End: 2024-04-17

## 2024-04-17 VITALS
RESPIRATION RATE: 18 BRPM | OXYGEN SATURATION: 98 % | DIASTOLIC BLOOD PRESSURE: 79 MMHG | WEIGHT: 164.46 LBS | SYSTOLIC BLOOD PRESSURE: 176 MMHG | TEMPERATURE: 100 F | HEIGHT: 65 IN | HEART RATE: 86 BPM

## 2024-04-17 DIAGNOSIS — Z90.49 ACQUIRED ABSENCE OF OTHER SPECIFIED PARTS OF DIGESTIVE TRACT: Chronic | ICD-10-CM

## 2024-04-17 DIAGNOSIS — Z94.0 KIDNEY TRANSPLANT STATUS: ICD-10-CM

## 2024-04-17 LAB
ALBUMIN SERPL ELPH-MCNC: 3 G/DL — LOW (ref 3.3–5)
ALBUMIN SERPL ELPH-MCNC: 4.2 G/DL — SIGNIFICANT CHANGE UP (ref 3.3–5)
ALP SERPL-CCNC: 157 U/L — HIGH (ref 40–120)
ALP SERPL-CCNC: 93 U/L — SIGNIFICANT CHANGE UP (ref 40–120)
ALT FLD-CCNC: 28 U/L — SIGNIFICANT CHANGE UP (ref 10–45)
ALT FLD-CCNC: 36 U/L — SIGNIFICANT CHANGE UP (ref 10–45)
ANION GAP SERPL CALC-SCNC: 15 MMOL/L — SIGNIFICANT CHANGE UP (ref 5–17)
ANION GAP SERPL CALC-SCNC: 17 MMOL/L — SIGNIFICANT CHANGE UP (ref 5–17)
APTT BLD: 27.3 SEC — SIGNIFICANT CHANGE UP (ref 24.5–35.6)
APTT BLD: 30.2 SEC — SIGNIFICANT CHANGE UP (ref 24.5–35.6)
AST SERPL-CCNC: 26 U/L — SIGNIFICANT CHANGE UP (ref 10–40)
AST SERPL-CCNC: 35 U/L — SIGNIFICANT CHANGE UP (ref 10–40)
BASE EXCESS BLDV CALC-SCNC: 4 MMOL/L — HIGH (ref -2–3)
BASOPHILS # BLD AUTO: 0 K/UL — SIGNIFICANT CHANGE UP (ref 0–0.2)
BASOPHILS # BLD AUTO: 0.03 K/UL — SIGNIFICANT CHANGE UP (ref 0–0.2)
BASOPHILS NFR BLD AUTO: 0 % — SIGNIFICANT CHANGE UP (ref 0–2)
BASOPHILS NFR BLD AUTO: 0.4 % — SIGNIFICANT CHANGE UP (ref 0–2)
BILIRUB SERPL-MCNC: 0.3 MG/DL — SIGNIFICANT CHANGE UP (ref 0.2–1.2)
BILIRUB SERPL-MCNC: 0.4 MG/DL — SIGNIFICANT CHANGE UP (ref 0.2–1.2)
BLD GP AB SCN SERPL QL: NEGATIVE — SIGNIFICANT CHANGE UP
BUN SERPL-MCNC: 69 MG/DL — HIGH (ref 7–23)
BUN SERPL-MCNC: 73 MG/DL — HIGH (ref 7–23)
CA-I SERPL-SCNC: 1.19 MMOL/L — SIGNIFICANT CHANGE UP (ref 1.15–1.33)
CALCIUM SERPL-MCNC: 8.4 MG/DL — SIGNIFICANT CHANGE UP (ref 8.4–10.5)
CALCIUM SERPL-MCNC: 9.5 MG/DL — SIGNIFICANT CHANGE UP (ref 8.4–10.5)
CHLORIDE BLDV-SCNC: 98 MMOL/L — SIGNIFICANT CHANGE UP (ref 96–108)
CHLORIDE SERPL-SCNC: 101 MMOL/L — SIGNIFICANT CHANGE UP (ref 96–108)
CHLORIDE SERPL-SCNC: 98 MMOL/L — SIGNIFICANT CHANGE UP (ref 96–108)
CO2 BLDV-SCNC: 32 MMOL/L — HIGH (ref 22–26)
CO2 SERPL-SCNC: 21 MMOL/L — LOW (ref 22–31)
CO2 SERPL-SCNC: 27 MMOL/L — SIGNIFICANT CHANGE UP (ref 22–31)
CREAT SERPL-MCNC: 8.41 MG/DL — HIGH (ref 0.5–1.3)
CREAT SERPL-MCNC: 8.41 MG/DL — HIGH (ref 0.5–1.3)
EGFR: 6 ML/MIN/1.73M2 — LOW
EGFR: 6 ML/MIN/1.73M2 — LOW
EOSINOPHIL # BLD AUTO: 0 K/UL — SIGNIFICANT CHANGE UP (ref 0–0.5)
EOSINOPHIL # BLD AUTO: 0.16 K/UL — SIGNIFICANT CHANGE UP (ref 0–0.5)
EOSINOPHIL NFR BLD AUTO: 0 % — SIGNIFICANT CHANGE UP (ref 0–6)
EOSINOPHIL NFR BLD AUTO: 2.3 % — SIGNIFICANT CHANGE UP (ref 0–6)
FIBRINOGEN PPP-MCNC: 256 MG/DL — SIGNIFICANT CHANGE UP (ref 200–445)
GAS PNL BLDA: SIGNIFICANT CHANGE UP
GAS PNL BLDA: SIGNIFICANT CHANGE UP
GAS PNL BLDV: 139 MMOL/L — SIGNIFICANT CHANGE UP (ref 136–145)
GAS PNL BLDV: SIGNIFICANT CHANGE UP
GLUCOSE BLDC GLUCOMTR-MCNC: 140 MG/DL — HIGH (ref 70–99)
GLUCOSE BLDC GLUCOMTR-MCNC: 187 MG/DL — HIGH (ref 70–99)
GLUCOSE BLDC GLUCOMTR-MCNC: 213 MG/DL — HIGH (ref 70–99)
GLUCOSE BLDV-MCNC: 195 MG/DL — HIGH (ref 70–99)
GLUCOSE SERPL-MCNC: 200 MG/DL — HIGH (ref 70–99)
GLUCOSE SERPL-MCNC: 218 MG/DL — HIGH (ref 70–99)
HBV SURFACE AG SER-ACNC: SIGNIFICANT CHANGE UP
HCO3 BLDV-SCNC: 30 MMOL/L — HIGH (ref 22–29)
HCT VFR BLD CALC: 31.4 % — LOW (ref 39–50)
HCT VFR BLD CALC: 34.8 % — LOW (ref 39–50)
HCT VFR BLDA CALC: 35 % — LOW (ref 39–51)
HCV RNA SPEC NAA+PROBE-LOG IU: SIGNIFICANT CHANGE UP
HCV RNA SPEC NAA+PROBE-LOG IU: SIGNIFICANT CHANGE UP LOGIU/ML
HGB BLD CALC-MCNC: 11.5 G/DL — LOW (ref 12.6–17.4)
HGB BLD-MCNC: 10.3 G/DL — LOW (ref 13–17)
HGB BLD-MCNC: 11.3 G/DL — LOW (ref 13–17)
HIV 1+2 AB+HIV1 P24 AG SERPL QL IA: SIGNIFICANT CHANGE UP
IMM GRANULOCYTES NFR BLD AUTO: 0.3 % — SIGNIFICANT CHANGE UP (ref 0–0.9)
INR BLD: 0.96 RATIO — SIGNIFICANT CHANGE UP (ref 0.85–1.18)
INR BLD: 1.07 RATIO — SIGNIFICANT CHANGE UP (ref 0.85–1.18)
LACTATE BLDV-MCNC: 1.1 MMOL/L — SIGNIFICANT CHANGE UP (ref 0.5–2)
LYMPHOCYTES # BLD AUTO: 0 % — LOW (ref 13–44)
LYMPHOCYTES # BLD AUTO: 0 K/UL — LOW (ref 1–3.3)
LYMPHOCYTES # BLD AUTO: 0.86 K/UL — LOW (ref 1–3.3)
LYMPHOCYTES # BLD AUTO: 12.5 % — LOW (ref 13–44)
MAGNESIUM SERPL-MCNC: 2.1 MG/DL — SIGNIFICANT CHANGE UP (ref 1.6–2.6)
MAGNESIUM SERPL-MCNC: 2.4 MG/DL — SIGNIFICANT CHANGE UP (ref 1.6–2.6)
MANUAL SMEAR VERIFICATION: SIGNIFICANT CHANGE UP
MCHC RBC-ENTMCNC: 32.1 PG — SIGNIFICANT CHANGE UP (ref 27–34)
MCHC RBC-ENTMCNC: 32.3 PG — SIGNIFICANT CHANGE UP (ref 27–34)
MCHC RBC-ENTMCNC: 32.5 GM/DL — SIGNIFICANT CHANGE UP (ref 32–36)
MCHC RBC-ENTMCNC: 32.8 GM/DL — SIGNIFICANT CHANGE UP (ref 32–36)
MCV RBC AUTO: 97.8 FL — SIGNIFICANT CHANGE UP (ref 80–100)
MCV RBC AUTO: 99.4 FL — SIGNIFICANT CHANGE UP (ref 80–100)
MONOCYTES # BLD AUTO: 0.14 K/UL — SIGNIFICANT CHANGE UP (ref 0–0.9)
MONOCYTES # BLD AUTO: 0.54 K/UL — SIGNIFICANT CHANGE UP (ref 0–0.9)
MONOCYTES NFR BLD AUTO: 1.7 % — LOW (ref 2–14)
MONOCYTES NFR BLD AUTO: 7.9 % — SIGNIFICANT CHANGE UP (ref 2–14)
NEUTROPHILS # BLD AUTO: 5.25 K/UL — SIGNIFICANT CHANGE UP (ref 1.8–7.4)
NEUTROPHILS # BLD AUTO: 8.16 K/UL — HIGH (ref 1.8–7.4)
NEUTROPHILS NFR BLD AUTO: 76.6 % — SIGNIFICANT CHANGE UP (ref 43–77)
NEUTROPHILS NFR BLD AUTO: 97.4 % — HIGH (ref 43–77)
NEUTS BAND # BLD: 0.9 % — SIGNIFICANT CHANGE UP (ref 0–8)
NRBC # BLD: 0 /100 WBCS — SIGNIFICANT CHANGE UP (ref 0–0)
PCO2 BLDV: 54 MMHG — SIGNIFICANT CHANGE UP (ref 42–55)
PH BLDV: 7.36 — SIGNIFICANT CHANGE UP (ref 7.32–7.43)
PHOSPHATE SERPL-MCNC: 4.3 MG/DL — SIGNIFICANT CHANGE UP (ref 2.5–4.5)
PHOSPHATE SERPL-MCNC: 4.5 MG/DL — SIGNIFICANT CHANGE UP (ref 2.5–4.5)
PLAT MORPH BLD: NORMAL — SIGNIFICANT CHANGE UP
PLATELET # BLD AUTO: 132 K/UL — LOW (ref 150–400)
PLATELET # BLD AUTO: 97 K/UL — LOW (ref 150–400)
PO2 BLDV: 49 MMHG — HIGH (ref 25–45)
POTASSIUM BLDV-SCNC: 3.8 MMOL/L — SIGNIFICANT CHANGE UP (ref 3.5–5.1)
POTASSIUM SERPL-MCNC: 3.8 MMOL/L — SIGNIFICANT CHANGE UP (ref 3.5–5.3)
POTASSIUM SERPL-MCNC: 3.9 MMOL/L — SIGNIFICANT CHANGE UP (ref 3.5–5.3)
POTASSIUM SERPL-SCNC: 3.8 MMOL/L — SIGNIFICANT CHANGE UP (ref 3.5–5.3)
POTASSIUM SERPL-SCNC: 3.9 MMOL/L — SIGNIFICANT CHANGE UP (ref 3.5–5.3)
PROT SERPL-MCNC: 5.4 G/DL — LOW (ref 6–8.3)
PROT SERPL-MCNC: 7.2 G/DL — SIGNIFICANT CHANGE UP (ref 6–8.3)
PROTHROM AB SERPL-ACNC: 10.1 SEC — SIGNIFICANT CHANGE UP (ref 9.5–13)
PROTHROM AB SERPL-ACNC: 11.8 SEC — SIGNIFICANT CHANGE UP (ref 9.5–13)
RBC # BLD: 3.21 M/UL — LOW (ref 4.2–5.8)
RBC # BLD: 3.5 M/UL — LOW (ref 4.2–5.8)
RBC # FLD: 14.5 % — SIGNIFICANT CHANGE UP (ref 10.3–14.5)
RBC # FLD: 14.6 % — HIGH (ref 10.3–14.5)
RBC BLD AUTO: SIGNIFICANT CHANGE UP
RH IG SCN BLD-IMP: POSITIVE — SIGNIFICANT CHANGE UP
RH IG SCN BLD-IMP: POSITIVE — SIGNIFICANT CHANGE UP
SAO2 % BLDV: 77.8 % — SIGNIFICANT CHANGE UP (ref 67–88)
SARS-COV-2 RNA SPEC QL NAA+PROBE: SIGNIFICANT CHANGE UP
SODIUM SERPL-SCNC: 139 MMOL/L — SIGNIFICANT CHANGE UP (ref 135–145)
SODIUM SERPL-SCNC: 140 MMOL/L — SIGNIFICANT CHANGE UP (ref 135–145)
WBC # BLD: 6.86 K/UL — SIGNIFICANT CHANGE UP (ref 3.8–10.5)
WBC # BLD: 8.3 K/UL — SIGNIFICANT CHANGE UP (ref 3.8–10.5)
WBC # FLD AUTO: 6.86 K/UL — SIGNIFICANT CHANGE UP (ref 3.8–10.5)
WBC # FLD AUTO: 8.3 K/UL — SIGNIFICANT CHANGE UP (ref 3.8–10.5)

## 2024-04-17 PROCEDURE — 74018 RADEX ABDOMEN 1 VIEW: CPT | Mod: 26

## 2024-04-17 PROCEDURE — 71045 X-RAY EXAM CHEST 1 VIEW: CPT | Mod: 26

## 2024-04-17 PROCEDURE — 99222 1ST HOSP IP/OBS MODERATE 55: CPT | Mod: GC

## 2024-04-17 PROCEDURE — 93010 ELECTROCARDIOGRAM REPORT: CPT

## 2024-04-17 PROCEDURE — 76776 US EXAM K TRANSPL W/DOPPLER: CPT | Mod: 26,RT

## 2024-04-17 PROCEDURE — 50605 INSERT URETERAL SUPPORT: CPT | Mod: RT,GC

## 2024-04-17 PROCEDURE — 50360 RNL ALTRNSPLJ W/O RCP NFRCT: CPT | Mod: GC

## 2024-04-17 PROCEDURE — 93010 ELECTROCARDIOGRAM REPORT: CPT | Mod: 77

## 2024-04-17 DEVICE — KIT A-LINE 1LUM 20G X 12CM SAFE KIT: Type: IMPLANTABLE DEVICE | Site: RIGHT | Status: FUNCTIONAL

## 2024-04-17 DEVICE — STENT URET DBL J CLSD 6FRX12CM: Type: IMPLANTABLE DEVICE | Site: RIGHT | Status: FUNCTIONAL

## 2024-04-17 RX ORDER — DEXTROSE 50 % IN WATER 50 %
25 SYRINGE (ML) INTRAVENOUS ONCE
Refills: 0 | Status: DISCONTINUED | OUTPATIENT
Start: 2024-04-17 | End: 2024-04-22

## 2024-04-17 RX ORDER — TRAMADOL HYDROCHLORIDE 50 MG/1
25 TABLET ORAL EVERY 6 HOURS
Refills: 0 | Status: DISCONTINUED | OUTPATIENT
Start: 2024-04-17 | End: 2024-04-22

## 2024-04-17 RX ORDER — DEXTROSE 50 % IN WATER 50 %
12.5 SYRINGE (ML) INTRAVENOUS ONCE
Refills: 0 | Status: DISCONTINUED | OUTPATIENT
Start: 2024-04-17 | End: 2024-04-22

## 2024-04-17 RX ORDER — TRAMADOL HYDROCHLORIDE 50 MG/1
50 TABLET ORAL EVERY 8 HOURS
Refills: 0 | Status: DISCONTINUED | OUTPATIENT
Start: 2024-04-17 | End: 2024-04-22

## 2024-04-17 RX ORDER — INFLUENZA VIRUS VACCINE 15; 15; 15; 15 UG/.5ML; UG/.5ML; UG/.5ML; UG/.5ML
0.7 SUSPENSION INTRAMUSCULAR ONCE
Refills: 0 | Status: DISCONTINUED | OUTPATIENT
Start: 2024-04-17 | End: 2024-04-22

## 2024-04-17 RX ORDER — SODIUM CHLORIDE 9 MG/ML
1000 INJECTION, SOLUTION INTRAVENOUS
Refills: 0 | Status: DISCONTINUED | OUTPATIENT
Start: 2024-04-17 | End: 2024-04-18

## 2024-04-17 RX ORDER — CEFAZOLIN SODIUM 1 G
2000 VIAL (EA) INJECTION ONCE
Refills: 0 | Status: COMPLETED | OUTPATIENT
Start: 2024-04-17 | End: 2024-04-17

## 2024-04-17 RX ORDER — CHLORHEXIDINE GLUCONATE 213 G/1000ML
1 SOLUTION TOPICAL DAILY
Refills: 0 | Status: DISCONTINUED | OUTPATIENT
Start: 2024-04-17 | End: 2024-04-22

## 2024-04-17 RX ORDER — FAMOTIDINE 10 MG/ML
20 INJECTION INTRAVENOUS DAILY
Refills: 0 | Status: DISCONTINUED | OUTPATIENT
Start: 2024-04-18 | End: 2024-04-22

## 2024-04-17 RX ORDER — SODIUM CHLORIDE 9 MG/ML
1000 INJECTION, SOLUTION INTRAVENOUS
Refills: 0 | Status: DISCONTINUED | OUTPATIENT
Start: 2024-04-17 | End: 2024-04-22

## 2024-04-17 RX ORDER — NYSTATIN 500MM UNIT
500000 POWDER (EA) MISCELLANEOUS
Refills: 0 | Status: DISCONTINUED | OUTPATIENT
Start: 2024-04-18 | End: 2024-04-22

## 2024-04-17 RX ORDER — HYDROMORPHONE HYDROCHLORIDE 2 MG/ML
30 INJECTION INTRAMUSCULAR; INTRAVENOUS; SUBCUTANEOUS
Refills: 0 | Status: DISCONTINUED | OUTPATIENT
Start: 2024-04-17 | End: 2024-04-17

## 2024-04-17 RX ORDER — DEXTROSE 10 % IN WATER 10 %
125 INTRAVENOUS SOLUTION INTRAVENOUS ONCE
Refills: 0 | Status: DISCONTINUED | OUTPATIENT
Start: 2024-04-17 | End: 2024-04-22

## 2024-04-17 RX ORDER — FENTANYL CITRATE 50 UG/ML
50 INJECTION INTRAVENOUS
Refills: 0 | Status: DISCONTINUED | OUTPATIENT
Start: 2024-04-17 | End: 2024-04-18

## 2024-04-17 RX ORDER — SENNA PLUS 8.6 MG/1
2 TABLET ORAL AT BEDTIME
Refills: 0 | Status: DISCONTINUED | OUTPATIENT
Start: 2024-04-17 | End: 2024-04-22

## 2024-04-17 RX ORDER — CARVEDILOL PHOSPHATE 80 MG/1
12.5 CAPSULE, EXTENDED RELEASE ORAL ONCE
Refills: 0 | Status: COMPLETED | OUTPATIENT
Start: 2024-04-17 | End: 2024-04-17

## 2024-04-17 RX ORDER — HYDROMORPHONE HYDROCHLORIDE 2 MG/ML
0.5 INJECTION INTRAMUSCULAR; INTRAVENOUS; SUBCUTANEOUS
Refills: 0 | Status: DISCONTINUED | OUTPATIENT
Start: 2024-04-17 | End: 2024-04-18

## 2024-04-17 RX ORDER — POLYETHYLENE GLYCOL 3350 17 G/17G
17 POWDER, FOR SOLUTION ORAL DAILY
Refills: 0 | Status: DISCONTINUED | OUTPATIENT
Start: 2024-04-17 | End: 2024-04-22

## 2024-04-17 RX ORDER — MYCOPHENOLATE MOFETIL 250 MG/1
1 CAPSULE ORAL EVERY 12 HOURS
Refills: 0 | Status: DISCONTINUED | OUTPATIENT
Start: 2024-04-18 | End: 2024-04-22

## 2024-04-17 RX ORDER — BASILIXIMAB 20 MG/5ML
20 INJECTION, POWDER, FOR SOLUTION INTRAVENOUS ONCE
Refills: 0 | Status: DISCONTINUED | OUTPATIENT
Start: 2024-04-17 | End: 2024-04-17

## 2024-04-17 RX ORDER — ONDANSETRON 8 MG/1
4 TABLET, FILM COATED ORAL EVERY 6 HOURS
Refills: 0 | Status: DISCONTINUED | OUTPATIENT
Start: 2024-04-17 | End: 2024-04-22

## 2024-04-17 RX ORDER — GLUCAGON INJECTION, SOLUTION 0.5 MG/.1ML
1 INJECTION, SOLUTION SUBCUTANEOUS ONCE
Refills: 0 | Status: DISCONTINUED | OUTPATIENT
Start: 2024-04-17 | End: 2024-04-22

## 2024-04-17 RX ORDER — INSULIN LISPRO 100/ML
VIAL (ML) SUBCUTANEOUS
Refills: 0 | Status: DISCONTINUED | OUTPATIENT
Start: 2024-04-17 | End: 2024-04-22

## 2024-04-17 RX ORDER — DEXTROSE 50 % IN WATER 50 %
15 SYRINGE (ML) INTRAVENOUS ONCE
Refills: 0 | Status: DISCONTINUED | OUTPATIENT
Start: 2024-04-17 | End: 2024-04-22

## 2024-04-17 RX ORDER — INSULIN LISPRO 100/ML
VIAL (ML) SUBCUTANEOUS AT BEDTIME
Refills: 0 | Status: DISCONTINUED | OUTPATIENT
Start: 2024-04-17 | End: 2024-04-22

## 2024-04-17 RX ORDER — NALBUPHINE HYDROCHLORIDE 10 MG/ML
2.5 INJECTION, SOLUTION INTRAMUSCULAR; INTRAVENOUS; SUBCUTANEOUS EVERY 6 HOURS
Refills: 0 | Status: DISCONTINUED | OUTPATIENT
Start: 2024-04-17 | End: 2024-04-22

## 2024-04-17 RX ORDER — VALGANCICLOVIR 450 MG/1
450 TABLET, FILM COATED ORAL
Refills: 0 | Status: DISCONTINUED | OUTPATIENT
Start: 2024-04-18 | End: 2024-04-20

## 2024-04-17 RX ORDER — SODIUM CHLORIDE 9 MG/ML
500 INJECTION, SOLUTION INTRAVENOUS ONCE
Refills: 0 | Status: COMPLETED | OUTPATIENT
Start: 2024-04-17 | End: 2024-04-17

## 2024-04-17 RX ORDER — NALOXONE HYDROCHLORIDE 4 MG/.1ML
0.1 SPRAY NASAL
Refills: 0 | Status: DISCONTINUED | OUTPATIENT
Start: 2024-04-17 | End: 2024-04-22

## 2024-04-17 RX ORDER — DIPHENHYDRAMINE HCL 50 MG
25 CAPSULE ORAL EVERY 8 HOURS
Refills: 0 | Status: DISCONTINUED | OUTPATIENT
Start: 2024-04-17 | End: 2024-04-22

## 2024-04-17 RX ORDER — ONDANSETRON 8 MG/1
4 TABLET, FILM COATED ORAL ONCE
Refills: 0 | Status: DISCONTINUED | OUTPATIENT
Start: 2024-04-17 | End: 2024-04-18

## 2024-04-17 RX ADMIN — HYDROMORPHONE HYDROCHLORIDE 0.5 MILLIGRAM(S): 2 INJECTION INTRAMUSCULAR; INTRAVENOUS; SUBCUTANEOUS at 18:30

## 2024-04-17 RX ADMIN — SODIUM CHLORIDE 70 MILLILITER(S): 9 INJECTION, SOLUTION INTRAVENOUS at 17:35

## 2024-04-17 RX ADMIN — CARVEDILOL PHOSPHATE 12.5 MILLIGRAM(S): 80 CAPSULE, EXTENDED RELEASE ORAL at 11:12

## 2024-04-17 RX ADMIN — SODIUM CHLORIDE 2000 MILLILITER(S): 9 INJECTION, SOLUTION INTRAVENOUS at 19:00

## 2024-04-17 RX ADMIN — HYDROMORPHONE HYDROCHLORIDE 0.5 MILLIGRAM(S): 2 INJECTION INTRAMUSCULAR; INTRAVENOUS; SUBCUTANEOUS at 18:45

## 2024-04-17 RX ADMIN — Medication 4: at 18:35

## 2024-04-17 NOTE — PROGRESS NOTE ADULT - ASSESSMENT
80 YO M with known CKD (2013), ESRD (2/2018) follows at hospitals dialysis unit [facility] dialysis unit. , DM (age 57) not on insulin; HTN (age 57). No known h/o kidney stone/ Prostatism. No hematuria/Nocturia/Transfusions. Urine out put: 1-2 cups/day. Has no h/o UTI. Pneumonia 25 years ago, was not hospitalized. No illness or hospitalization in the last 6 months. No history of kidney/ bladder/ prostate surgery. Non smoker. Quit 40 years ago, 1/2 ppd for 5 years. Left forearm AVG, Dr. Retana and Dr. Montes De Oca. Presents in usual state of health. Now S/P DDRT  POD#0.     [ ] s/p DDRT (POD#0)  - Post op H/H stable.   - US Doppler:  Patent Vasculature.    - strict I&Os: GLADIS, UO via rubin   - transfuse prn   - ADAT in AM  - IVF  - SCDs    [ ] Immunosuppression  -Envarsus to start in AM (will dose daily per level)  -MMF 1/1  -Thymoglobulin with SST    # [phone]  Transplant PA

## 2024-04-17 NOTE — CONSULT NOTE ADULT - SUBJECTIVE AND OBJECTIVE BOX
[facility] DIVISION OF KIDNEY DISEASES AND HYPERTENSION -- [phone]  -- INITIAL CONSULT NOTE  --------------------------------------------------------------------------------  HPI: 80 YO M with IDDM (age 57); HTN (age 57), and ESRD (follows with Dr. Olivares at [facility] dialysis unit [facility] dialysis unit) presents as renal transplant candidate. Transplant nephrology service consulted for immunosuppressive management.     Last HD 4/16. Makes very little urine. Denied fever, chills, SOB, chest pain, any recent illnesses, surgeries, or hospitalizations.        PAST HISTORY  --------------------------------------------------------------------------------  PAST MEDICAL & SURGICAL HISTORY:  Diabetes mellitus, type 2  Essential (primary) hypertension  GERD without esophagitis  ESRD (end stage renal disease)  Chronic gout of multiple sites, unspecified cause  Hyperlipidemia, unspecified hyperlipidemia type  Enlarged prostate  History of appendectomy      FAMILY HISTORY:  Family history of stomach cancer (Mother, Sibling)    Family history of essential hypertension (Father)    Family history of heart attack (Father)    Diabetes mellitus, type 2 (Sibling)      PAST SOCIAL HISTORY:    ALLERGIES & MEDICATIONS  --------------------------------------------------------------------------------  Allergies    No Known Allergies    Intolerances      Standing Inpatient Medications  antithymocyte globulin rabbit (peripheral) IVPB w/additives 100 milliGRAM(s) IV Intermittent once  carvedilol 12.5 milliGRAM(s) Oral once  influenza  Vaccine (HIGH DOSE) 0.7 milliLiter(s) IntraMuscular once  methylPREDNISolone sodium succinate IVPB 500 milliGRAM(s) IV Intermittent once    PRN Inpatient Medications      REVIEW OF SYSTEMS  --------------------------------------------------------------------------------  Constitutional: No fevers/chills  HEENT: No HA, sore throat   Respiratory: No dyspnea, cough  Cardiovascular: No chest pain  Gastrointestinal: No abdominal pain, diarrhea, nausea, vomiting  Genitourinary: No dysuria, hematuria, urgency  Extremities: No edema  Skin: No rashes  Heme: No easy bruising or bleeding    All other systems were reviewed and are negative, except as noted.    VITALS  --------------------------------------------------------------------------------  T(C): 36.8 (04-17-24 @ 07:08), Max: 37.6 (04-17-24 @ 04:30)  HR: 89 (04-17-24 @ 07:08) (79 - 89)  BP: 150/79 (04-17-24 @ 07:08) (150/79 - 176/79)  RR: 18 (04-17-24 @ 07:08) (18 - 18)  SpO2: 97% (04-17-24 @ 07:08) (97% - 98%)  Wt(kg): --  Height (cm): 165.1 (04-17-24 @ 04:30)  Weight (kg): 74.6 (04-17-24 @ 04:30)  BMI (kg/m2): 27.4 (04-17-24 @ 04:30)  BSA (m2): 1.82 (04-17-24 @ 04:30)    PHYSICAL EXAM:  General: no acute distress  Neuro: no focal deficits  HEENT: NC/AT, anicteric  Pulmonary: lungs CTA B/L  Cardiovascular/Chest: +S1S2, RRR  GI/Abdomen: soft, NT/ND, +bowel sounds  Extremities: No edema  Skin: Warm and dry  Vascular access: LUE AVF    LABS/STUDIES  --------------------------------------------------------------------------------              11.3   6.86  >-----------<  132      [04-17-24 @ 05:27]              34.8     140  |  98  |  69  ----------------------------<  200      [04-17-24 @ 05:27]  3.8   |  27  |  8.41        Ca     9.5     [04-17-24 @ 05:27]      Mg     2.4     [04-17-24 @ 05:27]      Phos  4.3     [04-17-24 @ 05:27]    TPro  7.2  /  Alb  4.2  /  TBili  0.4  /  DBili  x   /  AST  26  /  ALT  36  /  AlkPhos  157  [04-17-24 @ 05:27]    PT/INR: PT 10.1 , INR 0.96       [04-17-24 @ 05:27]  PTT: 30.2       [04-17-24 @ 05:27]      Creatinine Trend:  SCr 8.41 [04-17 @ 05:27]    Urinalysis - [04-17-24 @ 05:27]      Color  / Appearance  / SG  / pH       Gluc 200 / Ketone   / Bili  / Urobili        Blood  / Protein  / Leuk Est  / Nitrite       RBC  / WBC  / Hyaline  / Gran  / Sq Epi  / Non Sq Epi  / Bacteria       HBsAg Nonreact      [04-17-24 @ 05:27]

## 2024-04-17 NOTE — H&P ADULT - NSHPPHYSICALEXAM_GEN_ALL_CORE
Physical Exam: General: Well appearing, resting comfortably in bed in no acute distress  Neuro: Grossly intact bilaterally   HEENT: Normocephalic, atraumatic, no JVD, trachea midline   Heart: Regular S1/S2, no murmurs rubs or gallops    Lungs: Unlabored breathing on RA; Clear to auscultation bilaterally, no adventitious sounds   Abdomen: Soft, non-tender, normoactive bowel sounds, no tenderness to palpation in all 4 quadrants; well healed RLQ incision, PD catheter in LLQ without exudate/erythema, dressing is clean/dry/intact   Upper Extremities: No edema, freely mobile bilaterally   Lower Extremities: No edema,  2+ DP/PT/femoral pulses bilaterally   Skin: Warm, non-diaphoretic throughout

## 2024-04-17 NOTE — PATIENT PROFILE ADULT - NUMBER OF YRS
Patient Education     Pharyngitis: Strep (Confirmed)     You have had a positive test for strep throat. Strep throat is a contagious illness. It is spread by coughing, kissing or by touching others after touching your mouth or nose. Symptoms include throat pain which is worse with swallowing, aching all over, headache and fever. It is treated with antibiotic medication. This should help you start to feel better within 1-2 days.  Home care  · Rest at home. Drink plenty of fluids to avoid dehydration.  · No work or school for the first 2 days of taking the antibiotics. After this time, you will not be contagious. You can then return to school or work if you are feeling better.   · The antibiotic medication must be taken for the full 10 days, even if you feel better. This is very important to ensure the infection is treated. It is also important to prevent drug-resistent organisms from developing. If you were given an antibiotic shot, no more antibiotics are needed.  · You may use acetaminophen (Tylenol) or ibuprofen (Motrin, Advil) to control pain or fever, unless another medicine was prescribed for this. (NOTE: If you have chronic liver or kidney disease or ever had a stomach ulcer or GI bleeding, talk with your doctor before using these medicines.)  · Throat lozenges or sprays (such as Chloraseptic) help reduce pain. Gargling with warm salt water will also reduce throat pain. Dissolve 1/2 teaspoon of salt in 1 glass of warm water. This may be useful just before meals.   · Soft foods are okay. Avoid salty or spicy foods.  Follow-up care  Follow up with your healthcare provider or our staff if you are not improving over the next week.  When to seek medical advice  Call your healthcare provider right away if any of these occur:  · Fever as directed by your doctor   · New or worsening ear pain, sinus pain, or headache  · Painful lumps in the back of neck  · Stiff neck  · Lymph nodes are getting larger or becoming soft  in the middle  · Inability to swallow liquids, excessive drooling, or inability to open mouth wide due to throat pain  · Signs of dehydration (very dark urine or no urine, sunken eyes, dizziness)  · Trouble breathing or noisy breathing  · Muffled voice  · New rash  © 1311-5168 [employer]. [address] [place] [address], [place] [address]. All rights reserved. This information is not intended as a substitute for professional medical care. Always follow your healthcare professional's instructions.            5

## 2024-04-17 NOTE — PATIENT PROFILE ADULT - FALL HARM RISK - UNIVERSAL INTERVENTIONS
Bed in lowest position, wheels locked, appropriate side rails in place/Call bell, personal items and telephone in reach/Instruct patient to call for assistance before getting out of bed or chair/Non-slip footwear when patient is out of bed/[place] to call system/Physically safe environment - no spills, clutter or unnecessary equipment/Purposeful Proactive Rounding/Room/bathroom lighting operational, light cord in reach

## 2024-04-17 NOTE — BRIEF OPERATIVE NOTE - ANESTHESIOLOGIST NAME
Larry Montano

## 2024-04-17 NOTE — H&P ADULT - NS ATTEND AMEND GEN_ALL_CORE FT
GISELA MORALES was seen and evaluated today.  As documented, GISELA MORALES is a 79y Male with end-stage renal disease.  A  donor organ has been made available to her, and she has agreed to proceed with renal transplantation. She has had no major illnesses or hospitalizations since her last hospital visit.    We discussed the risks and benefits of kidney transplantation in depth.  Surgical risks included but were not limited to bleeding, infection, graft thrombosis, delayed graft function, urine leak, and potential need for re operation postoperatively.  We also discussed the risks of postoperative immunosuppression including but not limited to increased risk of infection, cancer, weight gain, new onset or worsening of diabetes or hypertension on a temporary or permanent basis, water retention, back pain, constipation, diarrhea, dizziness, headache, joint pain, loss of appetite, nausea, stomach pain or upset, trouble sleeping, vomiting, and/or mental or mood changes were fully disclosed. GISELA MORALES understands these risks and is willing to proceed with kidney transplantation. We discussed the differences in quality of life and patient survival between remaining on dialysis versus receiving a kidney transplant.

## 2024-04-17 NOTE — CONSULT NOTE ADULT - ASSESSMENT
78 YO M with IDDM (age 57); HTN (age 57), and ESRD (follows with Dr. Olivares at [facility] dialysis unit [facility]) presents as renal transplant candidate. Transplant nephrology service consulted for immunosuppressive management.     #ESRD 2/2 DM and HTN  #Renal transplant candidate  - last HD 4/16  - electrolytes wnl  - BP/volume status ok  - Will follow post-op      Nely Fu DO  Nephrology Fellow  Feel free to contact me directly on TEAMS with any questions.  (After 5pm or on weekends, please call the on-call fellow).

## 2024-04-17 NOTE — H&P ADULT - NSHPSOCIALHISTORY_GEN_ALL_CORE
Family History  Family history of ESRD on dialysis : Sister  Family history of malignant neoplasm of stomach (V16.0) (Z80.0) : Mother, Brother    Social History  Daily caffeinated coffee consumption  Former smoker (V15.82) (Z87.891)    One child  Remote social alcohol use  Remotely quit tobacco use  Retired

## 2024-04-17 NOTE — H&P ADULT - ASSESSMENT
78 YO M with known CKD (2013), ESRD (2/2018) follows at [facility] dialysis unit [facility] dialysis unit. , DM (age 57) not on insulin; HTN (age 57). No known h/o kidney stone/ Prostatism. No hematuria/Nocturia/Transfusions. Urine out put: 1-2 cups/day. Has no h/o UTI. Pneumonia 25 years ago, was not hospitalized. No illness or hospitalization in the last 6 months. No history of kidney/ bladder/ prostate surgery. Non smoker. Quit 40 years ago, 1/2 ppd for 5 years. Left forearm AVG, Dr. Retana and Dr. Montes De Oca. Presents in usual state of health. Pt presents today as a renal transplant candidate.     [ ] Kidney Transplant Candidate  - NPO except meds  - CBC, CMP, Mg/P, VBG  - Coags, T&S x2  - COVID swab  - CXR, EKG  - Ancef on call to OR  - Simulect  induction  - Solumdrol on call to OR.   - Consent done.   - Estimated OR time ~ 4/17/24 0830am.     # [id number]  Transplant PA 78 YO M with known CKD (2013), ESRD (2/2018) follows at [facility] dialysis unit [facility] dialysis unit. , DM (age 57) not on insulin; HTN (age 57). No known h/o kidney stone/ Prostatism. No hematuria/Nocturia/Transfusions. Urine out put: 1-2 cups/day. Has no h/o UTI. Pneumonia 25 years ago, was not hospitalized. No illness or hospitalization in the last 6 months. No history of kidney/ bladder/ prostate surgery. Non smoker. Quit 40 years ago, 1/2 ppd for 5 years. Left forearm AVG, Dr. Retana and Dr. Montes De Oca. Presents in usual state of health. Pt presents today as a renal transplant candidate.     [ ] Kidney Transplant Candidate  - NPO except meds  - CBC, CMP, Mg/P, VBG  - Coags, T&S x2  - COVID swab  - CXR, EKG  - Ancef on call to OR  - Simulect  induction  - Solumedrol on call to OR.   - Consent done.   - Estimated OR time ~ 4/17/24 0830am.     # [id number]  Transplant PA 78 YO M with known CKD (2013), ESRD (2/2018) follows at [facility] dialysis unit [facility] dialysis unit. , DM (age 57) not on insulin; HTN (age 57). No known h/o kidney stone/ Prostatism. No hematuria/Nocturia/Transfusions. Urine out put: 1-2 cups/day. Has no h/o UTI. Pneumonia 25 years ago, was not hospitalized. No illness or hospitalization in the last 6 months. No history of kidney/ bladder/ prostate surgery. Non smoker. Quit 40 years ago, 1/2 ppd for 5 years. Left forearm AVG, Dr. Retana and Dr. Montes De Oca. Presents in usual state of health. Pt presents today as a renal transplant candidate.     [ ] Kidney Transplant Candidate  - NPO except meds  - CBC, CMP, Mg/P, VBG  - Coags, T&S x2  - COVID swab  - CXR, EKG  - Ancef on call to OR  - Thymoglobulin  induction  - Solumedrol on call to OR.   - Consent done.   - Estimated OR time ~ 4/17/24 0830am.     # [id number]  Transplant PA

## 2024-04-17 NOTE — CONSULT NOTE ADULT - TIME BILLING
Kidney transplant candidate   reviewed pre transplant details, comorbidities, home medication regimen, available clinical, lab data, donor information  ESRD on hemodialysis, Last HD on 4/16/24.  DM, HTN, Hyperuricemia  Suggestions  Agree with plan for immunosuppression- Thymo induction, Tac/MMF/Pred early Belatacept conversion  Prophylaxis regimen  Coverage regimen for hyperglycemia  Will follow  I was present during and reviewed clinical and lab data as well as assessment and plan as documented by the house staff as noted. Please contact if any additional questions with any change in clinical condition or on availability of any additional information or reports.

## 2024-04-17 NOTE — H&P ADULT - NSICDXPASTMEDICALHX_GEN_ALL_CORE_FT
PAST MEDICAL HISTORY:  Chronic gout of multiple sites, unspecified cause     Diabetes mellitus, type 2     Enlarged prostate     ESRD (end stage renal disease)     Essential (primary) hypertension     GERD without esophagitis     Head injury with loss of consciousness 2014    Hyperlipidemia, unspecified hyperlipidemia type     Stage 4 chronic kidney disease

## 2024-04-17 NOTE — H&P ADULT - NSICDXFAMILYHX_GEN_ALL_CORE_FT
FAMILY HISTORY:  Father  Still living? No  Family history of essential hypertension, Age at diagnosis: Age Unknown  Family history of heart attack, Age at diagnosis: Age Unknown    Mother  Still living? Unknown  Family history of stomach cancer, Age at diagnosis: Age Unknown    Sibling  Still living? Yes, Estimated age: Age Unknown  Diabetes mellitus, type 2, Age at diagnosis: Age Unknown  Family history of stomach cancer, Age at diagnosis: Age Unknown

## 2024-04-17 NOTE — H&P ADULT - HISTORY OF PRESENT ILLNESS
76 YO M with known CKD (2013), ESRD (2/2018) follows at [facility] dialysis unit [facility] dialysis unit. , DM (age 57) not on insulin; HTN (age 57). No known h/o kidney stone/ Prostatism. No hematuria/Nocturia/Transfusions. Urine out put: 1-2 cups/day. Has no h/o UTI. Pneumonia 25 years ago, was not hospitalized. No illness or hospitalization in the last 6 months. No history of kidney/ bladder/ prostate surgery. Non smoker. Quit 40 years ago, 1/2 ppd for 5 years. Left forearm AVG, Dr. Retana and Dr. Montes De Oca. Presents in usual state of health. Pt presents today as a renal transplant candidate.    80 YO M with known CKD (2013), ESRD (2/2018) follows at [facility] dialysis unit [facility] dialysis unit. , DM (age 57) not on insulin; HTN (age 57). No known h/o kidney stone/ Prostatism. No hematuria/Nocturia/Transfusions. Urine out put: 1-2 cups/day. Has no h/o UTI. Pneumonia 25 years ago, was not hospitalized. No illness or hospitalization in the last 6 months. No history of kidney/ bladder/ prostate surgery. Non smoker. Quit 40 years ago, 1/2 ppd for 5 years. Left forearm AVG, Dr. Retana and Dr. Montes De Oca. Presents in usual state of health. Pt presents today as a renal transplant candidate.    80 YO M with known CKD (2013), ESRD (2/2018) [facility]. , DM (age 57) not on insulin; HTN (age 57). No known h/o kidney stone/ Prostatism. No hematuria/Nocturia/Transfusions. Urine out put: 1-2 cups/day. Has no h/o UTI. Pneumonia 25 years ago, was not hospitalized. No illness or hospitalization in the last 6 months. No history of kidney/ bladder/ prostate surgery. Non smoker. Quit 40 years ago, 1/2 ppd for 5 years. Left forearm AVG, Dr. Retana and Dr. Montes De Oca. Presents in usual state of health. Pt presents today as a renal transplant candidate.     10/26/2022 transthoracic echocardiogram which revealed normal left ventricular systolic function, diastolic LV dysfunction, no significant valve abnormalities, normal pulmonary pressures with an LVEF of 55-60%.    On 2/1/2023 he had a nuclear stress test which revealed a small, mild defect in the mid to distal inferolateral wall that is reversible and consistent with ischemia. LVEF 70%. LVEDV 86 mL.   When compared to prior study of 10/19/2020, there is no change.     Study of 10/19/2020 was followed by left heart catheterization which showed calcified coronaries with diffuse mid to distal LAD disease post large diagonal and moderate disease of distal codominant RCA.

## 2024-04-18 LAB
A1C WITH ESTIMATED AVERAGE GLUCOSE RESULT: 6.4 % — HIGH (ref 4–5.6)
ALBUMIN SERPL ELPH-MCNC: 3.1 G/DL — LOW (ref 3.3–5)
ALBUMIN SERPL ELPH-MCNC: 3.2 G/DL — LOW (ref 3.3–5)
ALP SERPL-CCNC: 87 U/L — SIGNIFICANT CHANGE UP (ref 40–120)
ALP SERPL-CCNC: 87 U/L — SIGNIFICANT CHANGE UP (ref 40–120)
ALT FLD-CCNC: 26 U/L — SIGNIFICANT CHANGE UP (ref 10–45)
ALT FLD-CCNC: 27 U/L — SIGNIFICANT CHANGE UP (ref 10–45)
ANION GAP SERPL CALC-SCNC: 15 MMOL/L — SIGNIFICANT CHANGE UP (ref 5–17)
ANION GAP SERPL CALC-SCNC: 20 MMOL/L — HIGH (ref 5–17)
AST SERPL-CCNC: 36 U/L — SIGNIFICANT CHANGE UP (ref 10–40)
AST SERPL-CCNC: 39 U/L — SIGNIFICANT CHANGE UP (ref 10–40)
BASOPHILS # BLD AUTO: 0.01 K/UL — SIGNIFICANT CHANGE UP (ref 0–0.2)
BASOPHILS # BLD AUTO: 0.03 K/UL — SIGNIFICANT CHANGE UP (ref 0–0.2)
BASOPHILS NFR BLD AUTO: 0.1 % — SIGNIFICANT CHANGE UP (ref 0–2)
BASOPHILS NFR BLD AUTO: 0.2 % — SIGNIFICANT CHANGE UP (ref 0–2)
BILIRUB SERPL-MCNC: 0.2 MG/DL — SIGNIFICANT CHANGE UP (ref 0.2–1.2)
BILIRUB SERPL-MCNC: 0.3 MG/DL — SIGNIFICANT CHANGE UP (ref 0.2–1.2)
BUN SERPL-MCNC: 73 MG/DL — HIGH (ref 7–23)
BUN SERPL-MCNC: 75 MG/DL — HIGH (ref 7–23)
CALCIUM SERPL-MCNC: 8.7 MG/DL — SIGNIFICANT CHANGE UP (ref 8.4–10.5)
CALCIUM SERPL-MCNC: 9.1 MG/DL — SIGNIFICANT CHANGE UP (ref 8.4–10.5)
CHLORIDE SERPL-SCNC: 101 MMOL/L — SIGNIFICANT CHANGE UP (ref 96–108)
CHLORIDE SERPL-SCNC: 103 MMOL/L — SIGNIFICANT CHANGE UP (ref 96–108)
CO2 SERPL-SCNC: 19 MMOL/L — LOW (ref 22–31)
CO2 SERPL-SCNC: 22 MMOL/L — SIGNIFICANT CHANGE UP (ref 22–31)
CREAT SERPL-MCNC: 7.41 MG/DL — HIGH (ref 0.5–1.3)
CREAT SERPL-MCNC: 7.99 MG/DL — HIGH (ref 0.5–1.3)
EGFR: 6 ML/MIN/1.73M2 — LOW
EGFR: 7 ML/MIN/1.73M2 — LOW
EOSINOPHIL # BLD AUTO: 0 K/UL — SIGNIFICANT CHANGE UP (ref 0–0.5)
EOSINOPHIL # BLD AUTO: 0.05 K/UL — SIGNIFICANT CHANGE UP (ref 0–0.5)
EOSINOPHIL NFR BLD AUTO: 0 % — SIGNIFICANT CHANGE UP (ref 0–6)
EOSINOPHIL NFR BLD AUTO: 0.4 % — SIGNIFICANT CHANGE UP (ref 0–6)
ESTIMATED AVERAGE GLUCOSE: 137 MG/DL — HIGH (ref 68–114)
GLUCOSE BLDC GLUCOMTR-MCNC: 177 MG/DL — HIGH (ref 70–99)
GLUCOSE BLDC GLUCOMTR-MCNC: 181 MG/DL — HIGH (ref 70–99)
GLUCOSE BLDC GLUCOMTR-MCNC: 327 MG/DL — HIGH (ref 70–99)
GLUCOSE BLDC GLUCOMTR-MCNC: 331 MG/DL — HIGH (ref 70–99)
GLUCOSE BLDC GLUCOMTR-MCNC: 386 MG/DL — HIGH (ref 70–99)
GLUCOSE SERPL-MCNC: 177 MG/DL — HIGH (ref 70–99)
GLUCOSE SERPL-MCNC: 183 MG/DL — HIGH (ref 70–99)
HBV CORE AB SER-ACNC: SIGNIFICANT CHANGE UP
HBV SURFACE AB SER-ACNC: 38 MIU/ML — SIGNIFICANT CHANGE UP
HCT VFR BLD CALC: 35.2 % — LOW (ref 39–50)
HCT VFR BLD CALC: 37.9 % — LOW (ref 39–50)
HCV AB S/CO SERPL IA: 0.07 S/CO — SIGNIFICANT CHANGE UP (ref 0–0.99)
HCV AB SERPL-IMP: SIGNIFICANT CHANGE UP
HGB BLD-MCNC: 11.6 G/DL — LOW (ref 13–17)
HGB BLD-MCNC: 12.4 G/DL — LOW (ref 13–17)
IMM GRANULOCYTES NFR BLD AUTO: 0.2 % — SIGNIFICANT CHANGE UP (ref 0–0.9)
IMM GRANULOCYTES NFR BLD AUTO: 0.3 % — SIGNIFICANT CHANGE UP (ref 0–0.9)
LYMPHOCYTES # BLD AUTO: 0.03 K/UL — LOW (ref 1–3.3)
LYMPHOCYTES # BLD AUTO: 0.09 K/UL — LOW (ref 1–3.3)
LYMPHOCYTES # BLD AUTO: 0.2 % — LOW (ref 13–44)
LYMPHOCYTES # BLD AUTO: 0.6 % — LOW (ref 13–44)
MAGNESIUM SERPL-MCNC: 2.2 MG/DL — SIGNIFICANT CHANGE UP (ref 1.6–2.6)
MAGNESIUM SERPL-MCNC: 2.3 MG/DL — SIGNIFICANT CHANGE UP (ref 1.6–2.6)
MCHC RBC-ENTMCNC: 31.9 PG — SIGNIFICANT CHANGE UP (ref 27–34)
MCHC RBC-ENTMCNC: 32 PG — SIGNIFICANT CHANGE UP (ref 27–34)
MCHC RBC-ENTMCNC: 32.7 GM/DL — SIGNIFICANT CHANGE UP (ref 32–36)
MCHC RBC-ENTMCNC: 33 GM/DL — SIGNIFICANT CHANGE UP (ref 32–36)
MCV RBC AUTO: 96.7 FL — SIGNIFICANT CHANGE UP (ref 80–100)
MCV RBC AUTO: 97.7 FL — SIGNIFICANT CHANGE UP (ref 80–100)
MONOCYTES # BLD AUTO: 0.18 K/UL — SIGNIFICANT CHANGE UP (ref 0–0.9)
MONOCYTES # BLD AUTO: 0.35 K/UL — SIGNIFICANT CHANGE UP (ref 0–0.9)
MONOCYTES NFR BLD AUTO: 1.5 % — LOW (ref 2–14)
MONOCYTES NFR BLD AUTO: 2.4 % — SIGNIFICANT CHANGE UP (ref 2–14)
NEUTROPHILS # BLD AUTO: 11.81 K/UL — HIGH (ref 1.8–7.4)
NEUTROPHILS # BLD AUTO: 14.06 K/UL — HIGH (ref 1.8–7.4)
NEUTROPHILS NFR BLD AUTO: 96.6 % — HIGH (ref 43–77)
NEUTROPHILS NFR BLD AUTO: 97.5 % — HIGH (ref 43–77)
NRBC # BLD: 0 /100 WBCS — SIGNIFICANT CHANGE UP (ref 0–0)
NRBC # BLD: 0 /100 WBCS — SIGNIFICANT CHANGE UP (ref 0–0)
PHOSPHATE SERPL-MCNC: 4.9 MG/DL — HIGH (ref 2.5–4.5)
PHOSPHATE SERPL-MCNC: 6 MG/DL — HIGH (ref 2.5–4.5)
PLATELET # BLD AUTO: 130 K/UL — LOW (ref 150–400)
PLATELET # BLD AUTO: 95 K/UL — LOW (ref 150–400)
POTASSIUM SERPL-MCNC: 4.3 MMOL/L — SIGNIFICANT CHANGE UP (ref 3.5–5.3)
POTASSIUM SERPL-MCNC: 4.7 MMOL/L — SIGNIFICANT CHANGE UP (ref 3.5–5.3)
POTASSIUM SERPL-SCNC: 4.3 MMOL/L — SIGNIFICANT CHANGE UP (ref 3.5–5.3)
POTASSIUM SERPL-SCNC: 4.7 MMOL/L — SIGNIFICANT CHANGE UP (ref 3.5–5.3)
PROT SERPL-MCNC: 5.6 G/DL — LOW (ref 6–8.3)
PROT SERPL-MCNC: 5.8 G/DL — LOW (ref 6–8.3)
RBC # BLD: 3.64 M/UL — LOW (ref 4.2–5.8)
RBC # BLD: 3.88 M/UL — LOW (ref 4.2–5.8)
RBC # FLD: 16.4 % — HIGH (ref 10.3–14.5)
RBC # FLD: 17.2 % — HIGH (ref 10.3–14.5)
SODIUM SERPL-SCNC: 140 MMOL/L — SIGNIFICANT CHANGE UP (ref 135–145)
SODIUM SERPL-SCNC: 140 MMOL/L — SIGNIFICANT CHANGE UP (ref 135–145)
WBC # BLD: 12.13 K/UL — HIGH (ref 3.8–10.5)
WBC # BLD: 14.56 K/UL — HIGH (ref 3.8–10.5)
WBC # FLD AUTO: 12.13 K/UL — HIGH (ref 3.8–10.5)
WBC # FLD AUTO: 14.56 K/UL — HIGH (ref 3.8–10.5)

## 2024-04-18 PROCEDURE — 99232 SBSQ HOSP IP/OBS MODERATE 35: CPT | Mod: GC,24

## 2024-04-18 PROCEDURE — 99232 SBSQ HOSP IP/OBS MODERATE 35: CPT | Mod: GC

## 2024-04-18 RX ORDER — DIPHENHYDRAMINE HCL 50 MG
25 CAPSULE ORAL ONCE
Refills: 0 | Status: COMPLETED | OUTPATIENT
Start: 2024-04-18 | End: 2024-04-18

## 2024-04-18 RX ORDER — NYSTATIN 100000 [USP'U]/ML
100000 SUSPENSION ORAL 4 TIMES DAILY
Qty: 1 | Refills: 0 | Status: ACTIVE | COMMUNITY
Start: 2024-04-18 | End: 1900-01-01

## 2024-04-18 RX ORDER — GLIPIZIDE 10 MG/1
10 TABLET ORAL
Qty: 180 | Refills: 0 | Status: DISCONTINUED | COMMUNITY
Start: 2016-11-15 | End: 2024-04-18

## 2024-04-18 RX ORDER — ATORVASTATIN CALCIUM 40 MG/1
40 TABLET, FILM COATED ORAL
Qty: 90 | Refills: 3 | Status: DISCONTINUED | COMMUNITY
End: 2024-04-18

## 2024-04-18 RX ORDER — ESCITALOPRAM OXALATE 10 MG/1
10 TABLET ORAL DAILY
Qty: 30 | Refills: 11 | Status: ACTIVE | COMMUNITY
Start: 2024-04-18 | End: 1900-01-01

## 2024-04-18 RX ORDER — ISOSORBIDE DINITRATE 5 MG/1
5 TABLET ORAL 3 TIMES DAILY
Qty: 270 | Refills: 3 | Status: DISCONTINUED | COMMUNITY
Start: 2021-05-24 | End: 2024-04-18

## 2024-04-18 RX ORDER — FERRIC CITRATE 210 MG/1
1 GM TABLET, COATED ORAL
Refills: 0 | Status: DISCONTINUED | COMMUNITY
End: 2024-04-18

## 2024-04-18 RX ORDER — ALLOPURINOL 100 MG/1
100 TABLET ORAL
Qty: 90 | Refills: 3 | Status: ACTIVE | COMMUNITY
Start: 2024-04-18 | End: 1900-01-01

## 2024-04-18 RX ORDER — PREDNISONE 5 MG/1
5 TABLET ORAL
Qty: 30 | Refills: 11 | Status: ACTIVE | COMMUNITY
Start: 2024-04-18 | End: 1900-01-01

## 2024-04-18 RX ORDER — FAMOTIDINE 20 MG/1
20 TABLET, FILM COATED ORAL
Qty: 30 | Refills: 11 | Status: ACTIVE | COMMUNITY
Start: 2024-04-18 | End: 1900-01-01

## 2024-04-18 RX ORDER — SULFAMETHOXAZOLE AND TRIMETHOPRIM 400; 80 MG/1; MG/1
400-80 TABLET ORAL
Qty: 30 | Refills: 5 | Status: ACTIVE | COMMUNITY
Start: 2024-04-18 | End: 1900-01-01

## 2024-04-18 RX ORDER — SENNOSIDES 8.6 MG
8.6 TABLET ORAL DAILY
Qty: 30 | Refills: 11 | Status: ACTIVE | COMMUNITY
Start: 2024-04-18 | End: 1900-01-01

## 2024-04-18 RX ORDER — CARVEDILOL 25 MG/1
25 TABLET, FILM COATED ORAL TWICE DAILY
Qty: 180 | Refills: 2 | Status: DISCONTINUED | COMMUNITY
Start: 2016-11-15 | End: 2024-04-18

## 2024-04-18 RX ORDER — ACETAMINOPHEN 500 MG
650 TABLET ORAL ONCE
Refills: 0 | Status: COMPLETED | OUTPATIENT
Start: 2024-04-18 | End: 2024-04-18

## 2024-04-18 RX ORDER — INSULIN LISPRO 100/ML
7 VIAL (ML) SUBCUTANEOUS
Refills: 0 | Status: DISCONTINUED | OUTPATIENT
Start: 2024-04-18 | End: 2024-04-18

## 2024-04-18 RX ORDER — BLOOD-GLUCOSE METER
W/DEVICE EACH MISCELLANEOUS
Qty: 1 | Refills: 0 | Status: DISCONTINUED | COMMUNITY
Start: 2016-12-26 | End: 2024-04-18

## 2024-04-18 RX ORDER — BLOOD SUGAR DIAGNOSTIC
STRIP MISCELLANEOUS
Qty: 50 | Refills: 0 | Status: DISCONTINUED | COMMUNITY
Start: 2016-12-26 | End: 2024-04-18

## 2024-04-18 RX ORDER — CHOLECALCIFEROL (VITAMIN D3) 1250 MCG
1.25 MG CAPSULE ORAL
Qty: 4 | Refills: 0 | Status: DISCONTINUED | COMMUNITY
Start: 2016-12-23 | End: 2024-04-18

## 2024-04-18 RX ORDER — ATORVASTATIN CALCIUM 40 MG/1
40 TABLET, FILM COATED ORAL
Qty: 30 | Refills: 11 | Status: ACTIVE | COMMUNITY
Start: 2024-04-18 | End: 1900-01-01

## 2024-04-18 RX ORDER — INSULIN GLARGINE 100 [IU]/ML
10 INJECTION, SOLUTION SUBCUTANEOUS AT BEDTIME
Refills: 0 | Status: DISCONTINUED | OUTPATIENT
Start: 2024-04-18 | End: 2024-04-18

## 2024-04-18 RX ORDER — ALLOPURINOL 100 MG/1
100 TABLET ORAL
Qty: 180 | Refills: 0 | Status: DISCONTINUED | COMMUNITY
Start: 2017-01-24 | End: 2024-04-18

## 2024-04-18 RX ORDER — INSULIN GLARGINE 100 [IU]/ML
18 INJECTION, SOLUTION SUBCUTANEOUS AT BEDTIME
Refills: 0 | Status: DISCONTINUED | OUTPATIENT
Start: 2024-04-18 | End: 2024-04-20

## 2024-04-18 RX ORDER — CHLORHEXIDINE GLUCONATE 4 %
325 (65 FE) LIQUID (ML) TOPICAL
Qty: 90 | Refills: 0 | Status: DISCONTINUED | COMMUNITY
Start: 2017-03-28 | End: 2024-04-18

## 2024-04-18 RX ORDER — CYCLOBENZAPRINE HYDROCHLORIDE 10 MG/1
10 TABLET, FILM COATED ORAL
Refills: 0 | Status: DISCONTINUED | COMMUNITY
End: 2024-04-18

## 2024-04-18 RX ORDER — HEPARIN SODIUM 5000 [USP'U]/ML
5000 INJECTION INTRAVENOUS; SUBCUTANEOUS EVERY 12 HOURS
Refills: 0 | Status: DISCONTINUED | OUTPATIENT
Start: 2024-04-18 | End: 2024-04-19

## 2024-04-18 RX ORDER — INSULIN LISPRO 100/ML
5 VIAL (ML) SUBCUTANEOUS
Refills: 0 | Status: DISCONTINUED | OUTPATIENT
Start: 2024-04-18 | End: 2024-04-20

## 2024-04-18 RX ORDER — FUROSEMIDE 40 MG/1
40 TABLET ORAL
Qty: 90 | Refills: 0 | Status: DISCONTINUED | COMMUNITY
Start: 2016-12-29 | End: 2024-04-18

## 2024-04-18 RX ORDER — POLYETHYLENE GLYCOL 3350 AND ELECTROLYTES WITH LEMON FLAVOR 236; 22.74; 6.74; 5.86; 2.97 G/4L; G/4L; G/4L; G/4L; G/4L
236 POWDER, FOR SOLUTION ORAL
Qty: 1 | Refills: 0 | Status: DISCONTINUED | COMMUNITY
Start: 2018-10-26 | End: 2024-04-18

## 2024-04-18 RX ORDER — LISINOPRIL 40 MG/1
40 TABLET ORAL DAILY
Qty: 90 | Refills: 3 | Status: DISCONTINUED | COMMUNITY
Start: 1900-01-01 | End: 2024-04-18

## 2024-04-18 RX ORDER — INSULIN LISPRO 100/ML
5 VIAL (ML) SUBCUTANEOUS
Refills: 0 | Status: DISCONTINUED | OUTPATIENT
Start: 2024-04-18 | End: 2024-04-18

## 2024-04-18 RX ORDER — INSULIN DEGLUDEC INJECTION 200 U/ML
INJECTION, SOLUTION SUBCUTANEOUS
Refills: 0 | Status: DISCONTINUED | COMMUNITY
End: 2024-04-18

## 2024-04-18 RX ORDER — TAMSULOSIN HYDROCHLORIDE 0.4 MG/1
0.4 CAPSULE ORAL
Qty: 30 | Refills: 0 | Status: DISCONTINUED | COMMUNITY
Start: 2017-03-16 | End: 2024-04-18

## 2024-04-18 RX ORDER — MYCOPHENOLATE MOFETIL 500 MG/1
500 TABLET ORAL
Qty: 360 | Refills: 3 | Status: ACTIVE | COMMUNITY
Start: 2024-04-18 | End: 1900-01-01

## 2024-04-18 RX ORDER — DULAGLUTIDE 1.5 MG/.5ML
1.5 INJECTION, SOLUTION SUBCUTANEOUS
Qty: 12 | Refills: 1 | Status: DISCONTINUED | COMMUNITY
Start: 2021-05-24 | End: 2024-04-18

## 2024-04-18 RX ORDER — ESCITALOPRAM OXALATE 10 MG/1
10 TABLET ORAL
Qty: 90 | Refills: 3 | Status: DISCONTINUED | COMMUNITY
Start: 2022-03-02 | End: 2024-04-18

## 2024-04-18 RX ORDER — BELATACEPT 250 MG/1
750 INJECTION, POWDER, LYOPHILIZED, FOR SOLUTION INTRAVENOUS ONCE
Refills: 0 | Status: COMPLETED | OUTPATIENT
Start: 2024-04-18 | End: 2024-04-18

## 2024-04-18 RX ORDER — VALGANCICLOVIR HYDROCHLORIDE 450 MG/1
450 TABLET ORAL
Qty: 30 | Refills: 5 | Status: ACTIVE | COMMUNITY
Start: 2024-04-18 | End: 1900-01-01

## 2024-04-18 RX ADMIN — Medication 5 UNIT(S): at 18:00

## 2024-04-18 RX ADMIN — Medication 500000 UNIT(S): at 21:42

## 2024-04-18 RX ADMIN — SENNA PLUS 2 TABLET(S): 8.6 TABLET ORAL at 20:04

## 2024-04-18 RX ADMIN — Medication 500000 UNIT(S): at 11:44

## 2024-04-18 RX ADMIN — Medication 650 MILLIGRAM(S): at 12:26

## 2024-04-18 RX ADMIN — Medication 8: at 18:00

## 2024-04-18 RX ADMIN — Medication 10: at 13:31

## 2024-04-18 RX ADMIN — Medication 1 TABLET(S): at 11:44

## 2024-04-18 RX ADMIN — BELATACEPT 200 MILLIGRAM(S): 250 INJECTION, POWDER, LYOPHILIZED, FOR SOLUTION INTRAVENOUS at 11:43

## 2024-04-18 RX ADMIN — POLYETHYLENE GLYCOL 3350 17 GRAM(S): 17 POWDER, FOR SOLUTION ORAL at 11:44

## 2024-04-18 RX ADMIN — INSULIN GLARGINE 18 UNIT(S): 100 INJECTION, SOLUTION SUBCUTANEOUS at 21:39

## 2024-04-18 RX ADMIN — Medication 500000 UNIT(S): at 18:00

## 2024-04-18 RX ADMIN — Medication 500000 UNIT(S): at 01:02

## 2024-04-18 RX ADMIN — Medication 500000 UNIT(S): at 05:21

## 2024-04-18 RX ADMIN — Medication 650 MILLIGRAM(S): at 12:31

## 2024-04-18 RX ADMIN — MYCOPHENOLATE MOFETIL 1 GRAM(S): 250 CAPSULE ORAL at 20:03

## 2024-04-18 RX ADMIN — MYCOPHENOLATE MOFETIL 1 GRAM(S): 250 CAPSULE ORAL at 07:57

## 2024-04-18 RX ADMIN — Medication 25 MILLIGRAM(S): at 12:26

## 2024-04-18 RX ADMIN — Medication 50 MILLIGRAM(S): at 12:26

## 2024-04-18 RX ADMIN — SODIUM CHLORIDE 70 MILLILITER(S): 9 INJECTION, SOLUTION INTRAVENOUS at 08:58

## 2024-04-18 RX ADMIN — FAMOTIDINE 20 MILLIGRAM(S): 10 INJECTION INTRAVENOUS at 11:44

## 2024-04-18 RX ADMIN — HEPARIN SODIUM 5000 UNIT(S): 5000 INJECTION INTRAVENOUS; SUBCUTANEOUS at 17:59

## 2024-04-18 RX ADMIN — CHLORHEXIDINE GLUCONATE 1 APPLICATION(S): 213 SOLUTION TOPICAL at 13:50

## 2024-04-18 RX ADMIN — Medication 4: at 21:39

## 2024-04-18 RX ADMIN — Medication 2: at 08:58

## 2024-04-18 NOTE — PROGRESS NOTE ADULT - NS ATTEND AMEND GEN_ALL_CORE FT
POD1 from DDRT  volume responsive hypotension overnight.   good UOP, Cr downtrending  Immuno: thymo today (#2 of 3). Nicole de susan pathway (Days 1,5), MMF 1gm BID,  steroid taper.  start SQH today

## 2024-04-18 NOTE — PHYSICAL THERAPY INITIAL EVALUATION ADULT - GENERAL OBSERVATIONS, REHAB EVAL
Rec'd OOB in recliner, NAD, +Rt wrist IV, +Rt cephalic IV, +RLQ GLADIS maintaining self-suction, +tele, +rubin, +BLE PAS, +eager to work with P/T

## 2024-04-18 NOTE — PROGRESS NOTE ADULT - ASSESSMENT
78 YO M with IDDM (age 57); HTN (age 57), and ESRD (follows with Dr. Olivares at [facility] dialysis unit [facility] dialysis unit) presents as renal transplant candidate. Transplant nephrology service consulted for immunosuppressive management.     #s/p DDRT 4/17  - last HD 4/16  - Cr trending down  - UO 790cc/24h  - electrolytes wnl  - BP/volume status ok    #Immunosuppression  - Thymo induction  - Plan for de susan Belatacept (first dose today), MMF, steroid taper per protocol      Nely Fu DO  Nephrology Fellow  Feel free to contact me directly on TEAMS with any questions.  (After 5pm or on weekends, please call the on-call fellow).

## 2024-04-18 NOTE — PHYSICAL THERAPY INITIAL EVALUATION ADULT - EDEMA 2+ (MILD)
generalized/left arm/right arm/left wrist/right wrist/left hand/right hand/left ankle/left foot/right foot/right ankle

## 2024-04-18 NOTE — PROGRESS NOTE ADULT - TIME BILLING
Kidney recipient post op day 1  Non oliguric  Recvd 1 unit [facility]  Live donor transplant, Thymo induction, Nicole/MMF/steroids maintenance  Reviewed clinical, lab, imaging data  Reviewed immunosuppression, prophylaxis , fluid regimen  Reviewed op note, pre transplant work up, native kidney disease  Suggestions  Continue std steroid taper  Monitor electrolytes, creatinine, Belatacept de susan protocol  Will follow  I was present during and reviewed clinical and lab data as well as assessment and plan as documented by the house staff as noted. Please contact if any additional questions with any change in clinical condition or on availability of any additional information or reports. Kidney recipient post op day 1  Non oliguric  Recvd 1 unit [facility]   donor transplant, Thymo induction, Nicole/MMF/steroids maintenance  Reviewed clinical, lab, imaging data  Reviewed immunosuppression, prophylaxis , fluid regimen  Reviewed op note, pre transplant work up, native kidney disease  Suggestions  Continue std steroid taper  Monitor electrolytes, creatinine, Belatacept de susan protocol  Will follow  I was present during and reviewed clinical and lab data as well as assessment and plan as documented by the house staff as noted. Please contact if any additional questions with any change in clinical condition or on availability of any additional information or reports.

## 2024-04-18 NOTE — PHYSICAL THERAPY INITIAL EVALUATION ADULT - PERTINENT HX OF CURRENT PROBLEM, REHAB EVAL
78 YO M with known CKD (2013), ESRD (2/2018), DM (age 57) not on insulin; HTN (age 57). No known h/o kidney stone/ Prostatism. No hematuria/Nocturia/Transfusions. Urine out put: 1-2 cups/day. Has no h/o UTI. Pneumonia 25 years ago, was not hospitalized. +Left forearm AVG. Presented as a renal transplant candidate. Hosp Course: 4/17 OR: DDRT; +post-op hypotensive responsive to fluid bolus; renal sono: Right lower quadrant renal transplant without hydronephrosis. Patent renal transplant vasculature. Mildly elevated resistive indices.

## 2024-04-18 NOTE — PHYSICAL THERAPY INITIAL EVALUATION ADULT - PLANNED THERAPY INTERVENTIONS, PT EVAL
GOAL: Pt will negotiate up/down 10 steps with unilateral handrail ascending independently in 2 weeks/balance training/bed mobility training/gait training/strengthening/transfer training

## 2024-04-18 NOTE — PHYSICAL THERAPY INITIAL EVALUATION ADULT - GAIT DISTANCE, PT EVAL
500 feet with RW; Patient also amb additional 100ft without device with CG assist x1 with decreased maylin, step length

## 2024-04-18 NOTE — PHYSICAL THERAPY INITIAL EVALUATION ADULT - TRANSFER TRAINING, PT EVAL
GOAL: Patient will perform sit to stand transfers independently with proper hand placement in 2 weeks

## 2024-04-18 NOTE — PROGRESS NOTE ADULT - ASSESSMENT
80 YO M with known CKD (2013), ESRD (2/2018) follows at [facility] dialysis unit [facility]. , DM (age 57) not on insulin; HTN (age 57). No known h/o kidney stone/ Prostatism. No hematuria/Nocturia/Transfusions. Urine out put: 1-2 cups/day. Has no h/o UTI. Pneumonia 25 years ago, was not hospitalized. No illness or hospitalization in the last 6 months. No history of kidney/ bladder/ prostate surgery. Non smoker. Quit 40 years ago, 1/2 ppd for 5 years. Left forearm AVG, Dr. Retana and Dr. Montes De Oca. Presents in usual state of health. Now S/P DDRT  POD#0.     [ ] s/p DDRT (POD#1)  - US Doppler:  Patent Vasculature.    - strict I&Os: GLADIS, UO via rubin   - Advance to regular diet  - dc IVF  - SCDs/PT/IS    [ ] Immunosuppression  - Nicole 1st dose today  - Thymo#2 dose today  -MMF 1/1, pred taper    # [id number]  Transplant PA

## 2024-04-18 NOTE — PHYSICAL THERAPY INITIAL EVALUATION ADULT - ADDITIONAL COMMENTS
Patient resides in [address] [facility] home with wife. +flight of stairs with +raling; +3-4 steps to enter with +rail

## 2024-04-19 LAB
A1C WITH ESTIMATED AVERAGE GLUCOSE RESULT: 6.6 % — HIGH (ref 4–5.6)
ALBUMIN SERPL ELPH-MCNC: 3 G/DL — LOW (ref 3.3–5)
ALP SERPL-CCNC: 73 U/L — SIGNIFICANT CHANGE UP (ref 40–120)
ALT FLD-CCNC: 17 U/L — SIGNIFICANT CHANGE UP (ref 10–45)
ANION GAP SERPL CALC-SCNC: 15 MMOL/L — SIGNIFICANT CHANGE UP (ref 5–17)
AST SERPL-CCNC: 26 U/L — SIGNIFICANT CHANGE UP (ref 10–40)
BASOPHILS # BLD AUTO: 0 K/UL — SIGNIFICANT CHANGE UP (ref 0–0.2)
BASOPHILS NFR BLD AUTO: 0 % — SIGNIFICANT CHANGE UP (ref 0–2)
BILIRUB SERPL-MCNC: 0.2 MG/DL — SIGNIFICANT CHANGE UP (ref 0.2–1.2)
BUN SERPL-MCNC: 72 MG/DL — HIGH (ref 7–23)
CALCIUM SERPL-MCNC: 9.2 MG/DL — SIGNIFICANT CHANGE UP (ref 8.4–10.5)
CHLORIDE SERPL-SCNC: 103 MMOL/L — SIGNIFICANT CHANGE UP (ref 96–108)
CO2 SERPL-SCNC: 21 MMOL/L — LOW (ref 22–31)
CREAT SERPL-MCNC: 4.55 MG/DL — HIGH (ref 0.5–1.3)
EGFR: 13 ML/MIN/1.73M2 — LOW
EOSINOPHIL # BLD AUTO: 0 K/UL — SIGNIFICANT CHANGE UP (ref 0–0.5)
EOSINOPHIL NFR BLD AUTO: 0 % — SIGNIFICANT CHANGE UP (ref 0–6)
ESTIMATED AVERAGE GLUCOSE: 143 MG/DL — HIGH (ref 68–114)
GLUCOSE BLDC GLUCOMTR-MCNC: 199 MG/DL — HIGH (ref 70–99)
GLUCOSE BLDC GLUCOMTR-MCNC: 202 MG/DL — HIGH (ref 70–99)
GLUCOSE BLDC GLUCOMTR-MCNC: 211 MG/DL — HIGH (ref 70–99)
GLUCOSE BLDC GLUCOMTR-MCNC: 309 MG/DL — HIGH (ref 70–99)
GLUCOSE SERPL-MCNC: 223 MG/DL — HIGH (ref 70–99)
HCT VFR BLD CALC: 30.9 % — LOW (ref 39–50)
HCT VFR BLD CALC: 35 % — LOW (ref 39–50)
HGB BLD-MCNC: 10.2 G/DL — LOW (ref 13–17)
HGB BLD-MCNC: 11.1 G/DL — LOW (ref 13–17)
IMM GRANULOCYTES NFR BLD AUTO: 0.5 % — SIGNIFICANT CHANGE UP (ref 0–0.9)
LYMPHOCYTES # BLD AUTO: 0.11 K/UL — LOW (ref 1–3.3)
LYMPHOCYTES # BLD AUTO: 1.1 % — LOW (ref 13–44)
MAGNESIUM SERPL-MCNC: 2.4 MG/DL — SIGNIFICANT CHANGE UP (ref 1.6–2.6)
MCHC RBC-ENTMCNC: 31.3 PG — SIGNIFICANT CHANGE UP (ref 27–34)
MCHC RBC-ENTMCNC: 31.7 GM/DL — LOW (ref 32–36)
MCHC RBC-ENTMCNC: 32 PG — SIGNIFICANT CHANGE UP (ref 27–34)
MCHC RBC-ENTMCNC: 33 GM/DL — SIGNIFICANT CHANGE UP (ref 32–36)
MCV RBC AUTO: 96.9 FL — SIGNIFICANT CHANGE UP (ref 80–100)
MCV RBC AUTO: 98.6 FL — SIGNIFICANT CHANGE UP (ref 80–100)
MONOCYTES # BLD AUTO: 0.53 K/UL — SIGNIFICANT CHANGE UP (ref 0–0.9)
MONOCYTES NFR BLD AUTO: 5.5 % — SIGNIFICANT CHANGE UP (ref 2–14)
NEUTROPHILS # BLD AUTO: 8.88 K/UL — HIGH (ref 1.8–7.4)
NEUTROPHILS NFR BLD AUTO: 92.9 % — HIGH (ref 43–77)
NRBC # BLD: 0 /100 WBCS — SIGNIFICANT CHANGE UP (ref 0–0)
NRBC # BLD: 0 /100 WBCS — SIGNIFICANT CHANGE UP (ref 0–0)
PHOSPHATE SERPL-MCNC: 4.8 MG/DL — HIGH (ref 2.5–4.5)
PLATELET # BLD AUTO: 105 K/UL — LOW (ref 150–400)
PLATELET # BLD AUTO: 110 K/UL — LOW (ref 150–400)
POTASSIUM SERPL-MCNC: 4.4 MMOL/L — SIGNIFICANT CHANGE UP (ref 3.5–5.3)
POTASSIUM SERPL-SCNC: 4.4 MMOL/L — SIGNIFICANT CHANGE UP (ref 3.5–5.3)
PROT SERPL-MCNC: 5.5 G/DL — LOW (ref 6–8.3)
RBC # BLD: 3.19 M/UL — LOW (ref 4.2–5.8)
RBC # BLD: 3.55 M/UL — LOW (ref 4.2–5.8)
RBC # FLD: 17 % — HIGH (ref 10.3–14.5)
RBC # FLD: 17 % — HIGH (ref 10.3–14.5)
SODIUM SERPL-SCNC: 139 MMOL/L — SIGNIFICANT CHANGE UP (ref 135–145)
WBC # BLD: 9.57 K/UL — SIGNIFICANT CHANGE UP (ref 3.8–10.5)
WBC # BLD: 9.8 K/UL — SIGNIFICANT CHANGE UP (ref 3.8–10.5)
WBC # FLD AUTO: 9.57 K/UL — SIGNIFICANT CHANGE UP (ref 3.8–10.5)
WBC # FLD AUTO: 9.8 K/UL — SIGNIFICANT CHANGE UP (ref 3.8–10.5)

## 2024-04-19 PROCEDURE — 99232 SBSQ HOSP IP/OBS MODERATE 35: CPT | Mod: GC,24

## 2024-04-19 PROCEDURE — 99232 SBSQ HOSP IP/OBS MODERATE 35: CPT | Mod: GC

## 2024-04-19 RX ORDER — PEN NEEDLE, DIABETIC 29 G X1/2"
32G X 4 MM NEEDLE, DISPOSABLE MISCELLANEOUS
Qty: 120 | Refills: 3 | Status: ACTIVE | COMMUNITY
Start: 2024-04-19 | End: 1900-01-01

## 2024-04-19 RX ORDER — BELATACEPT 250 MG/1
250 INJECTION, POWDER, LYOPHILIZED, FOR SOLUTION INTRAVENOUS
Refills: 0 | Status: ACTIVE | OUTPATIENT
Start: 2024-04-19

## 2024-04-19 RX ORDER — BELATACEPT 250 MG/1
250 INJECTION, POWDER, LYOPHILIZED, FOR SOLUTION INTRAVENOUS
Qty: 4 | Refills: 11 | Status: ACTIVE | COMMUNITY
Start: 2024-04-19 | End: 1900-01-01

## 2024-04-19 RX ORDER — ASPIRIN/CALCIUM CARB/MAGNESIUM 324 MG
81 TABLET ORAL DAILY
Refills: 0 | Status: DISCONTINUED | OUTPATIENT
Start: 2024-04-19 | End: 2024-04-19

## 2024-04-19 RX ORDER — BELATACEPT 250 MG/1
250 INJECTION, POWDER, LYOPHILIZED, FOR SOLUTION INTRAVENOUS
Qty: 2 | Refills: 11 | Status: ACTIVE | COMMUNITY
Start: 2024-04-19 | End: 1900-01-01

## 2024-04-19 RX ORDER — INSULIN DEGLUDEC INJECTION 100 U/ML
100 INJECTION, SOLUTION SUBCUTANEOUS AT BEDTIME
Qty: 30 | Refills: 1 | Status: ACTIVE | COMMUNITY
Start: 2024-04-19 | End: 1900-01-01

## 2024-04-19 RX ORDER — ESCITALOPRAM OXALATE 10 MG/1
10 TABLET, FILM COATED ORAL DAILY
Refills: 0 | Status: DISCONTINUED | OUTPATIENT
Start: 2024-04-19 | End: 2024-04-22

## 2024-04-19 RX ORDER — DIPHENHYDRAMINE HCL 50 MG
25 CAPSULE ORAL ONCE
Refills: 0 | Status: COMPLETED | OUTPATIENT
Start: 2024-04-19 | End: 2024-04-19

## 2024-04-19 RX ORDER — ATORVASTATIN CALCIUM 80 MG/1
40 TABLET, FILM COATED ORAL AT BEDTIME
Refills: 0 | Status: DISCONTINUED | OUTPATIENT
Start: 2024-04-19 | End: 2024-04-22

## 2024-04-19 RX ORDER — ALLOPURINOL 300 MG
100 TABLET ORAL DAILY
Refills: 0 | Status: DISCONTINUED | OUTPATIENT
Start: 2024-04-19 | End: 2024-04-22

## 2024-04-19 RX ORDER — ACETAMINOPHEN 500 MG
650 TABLET ORAL ONCE
Refills: 0 | Status: COMPLETED | OUTPATIENT
Start: 2024-04-19 | End: 2024-04-19

## 2024-04-19 RX ADMIN — Medication 4: at 18:09

## 2024-04-19 RX ADMIN — MYCOPHENOLATE MOFETIL 1 GRAM(S): 250 CAPSULE ORAL at 19:38

## 2024-04-19 RX ADMIN — Medication 5 UNIT(S): at 18:09

## 2024-04-19 RX ADMIN — Medication 650 MILLIGRAM(S): at 12:37

## 2024-04-19 RX ADMIN — Medication 4: at 13:44

## 2024-04-19 RX ADMIN — Medication 125 MILLIGRAM(S): at 12:38

## 2024-04-19 RX ADMIN — Medication 650 MILLIGRAM(S): at 12:44

## 2024-04-19 RX ADMIN — SENNA PLUS 2 TABLET(S): 8.6 TABLET ORAL at 19:38

## 2024-04-19 RX ADMIN — INSULIN GLARGINE 18 UNIT(S): 100 INJECTION, SOLUTION SUBCUTANEOUS at 21:33

## 2024-04-19 RX ADMIN — Medication 500000 UNIT(S): at 12:21

## 2024-04-19 RX ADMIN — ATORVASTATIN CALCIUM 40 MILLIGRAM(S): 80 TABLET, FILM COATED ORAL at 19:38

## 2024-04-19 RX ADMIN — VALGANCICLOVIR 450 MILLIGRAM(S): 450 TABLET, FILM COATED ORAL at 08:04

## 2024-04-19 RX ADMIN — MYCOPHENOLATE MOFETIL 1 GRAM(S): 250 CAPSULE ORAL at 08:03

## 2024-04-19 RX ADMIN — HEPARIN SODIUM 5000 UNIT(S): 5000 INJECTION INTRAVENOUS; SUBCUTANEOUS at 05:04

## 2024-04-19 RX ADMIN — POLYETHYLENE GLYCOL 3350 17 GRAM(S): 17 POWDER, FOR SOLUTION ORAL at 12:21

## 2024-04-19 RX ADMIN — Medication 5 UNIT(S): at 09:06

## 2024-04-19 RX ADMIN — FAMOTIDINE 20 MILLIGRAM(S): 10 INJECTION INTRAVENOUS at 12:21

## 2024-04-19 RX ADMIN — Medication 500000 UNIT(S): at 05:04

## 2024-04-19 RX ADMIN — Medication 500000 UNIT(S): at 18:09

## 2024-04-19 RX ADMIN — Medication 2: at 09:06

## 2024-04-19 RX ADMIN — CHLORHEXIDINE GLUCONATE 1 APPLICATION(S): 213 SOLUTION TOPICAL at 12:21

## 2024-04-19 RX ADMIN — Medication 500000 UNIT(S): at 21:35

## 2024-04-19 RX ADMIN — Medication 1 TABLET(S): at 12:21

## 2024-04-19 RX ADMIN — Medication 25 MILLIGRAM(S): at 12:37

## 2024-04-19 RX ADMIN — Medication 5 UNIT(S): at 13:44

## 2024-04-19 RX ADMIN — Medication 4: at 21:34

## 2024-04-19 NOTE — DIETITIAN INITIAL EVALUATION ADULT - PERTINENT MEDS FT
MEDICATIONS  (STANDING):  allopurinol 100 milliGRAM(s) Oral daily  atorvastatin 40 milliGRAM(s) Oral at bedtime  chlorhexidine 2% Cloths 1 Application(s) Topical daily  dextrose 10% Bolus 125 milliLiter(s) IV Bolus once  dextrose 5%. 1000 milliLiter(s) (50 mL/Hr) IV Continuous <Continuous>  dextrose 5%. 1000 milliLiter(s) (100 mL/Hr) IV Continuous <Continuous>  dextrose 50% Injectable 25 Gram(s) IV Push once  dextrose 50% Injectable 12.5 Gram(s) IV Push once  escitalopram 10 milliGRAM(s) Oral daily  famotidine    Tablet 20 milliGRAM(s) Oral daily  glucagon  Injectable 1 milliGRAM(s) IntraMuscular once  influenza  Vaccine (HIGH DOSE) 0.7 milliLiter(s) IntraMuscular once  insulin glargine Injectable (LANTUS) 18 Unit(s) SubCutaneous at bedtime  insulin lispro (ADMELOG) corrective regimen sliding scale   SubCutaneous three times a day before meals  insulin lispro (ADMELOG) corrective regimen sliding scale   SubCutaneous at bedtime  insulin lispro Injectable (ADMELOG) 5 Unit(s) SubCutaneous three times a day before meals  mycophenolate mofetil 1 Gram(s) Oral every 12 hours  nystatin    Suspension 621619 Unit(s) Swish and Swallow four times a day  polyethylene glycol 3350 17 Gram(s) Oral daily  senna 2 Tablet(s) Oral at bedtime  trimethoprim   80 mG/sulfamethoxazole 400 mG 1 Tablet(s) Oral daily  valGANciclovir 450 milliGRAM(s) Oral <User Schedule>    MEDICATIONS  (PRN):  dextrose Oral Gel 15 Gram(s) Oral once PRN Blood Glucose LESS THAN 70 milliGRAM(s)/deciliter  diphenhydrAMINE Injectable 25 milliGRAM(s) IV Push every 8 hours PRN Pruritus  nalbuphine Injectable 2.5 milliGRAM(s) IV Push every 6 hours PRN Pruritus  naloxone Injectable 0.1 milliGRAM(s) IV Push every 3 minutes PRN For ANY of the following changes in patient status:  A. RR LESS THAN 10 breaths per minute, B. Oxygen saturation LESS THAN 90%, C. Sedation score of 6  ondansetron Injectable 4 milliGRAM(s) IV Push every 6 hours PRN Nausea and/or Vomiting  ondansetron Injectable 4 milliGRAM(s) IV Push every 6 hours PRN Nausea  traMADol 50 milliGRAM(s) Oral every 8 hours PRN Severe Pain (7 - 10)  traMADol 25 milliGRAM(s) Oral every 6 hours PRN Moderate Pain (4 - 6)

## 2024-04-19 NOTE — PROGRESS NOTE ADULT - NS ATTEND AMEND GEN_ALL_CORE FT
Good graft function.   Start ASA 81. SQHep  Immuno: thymo 3/3, isma dose #1 yesterday, MMF 1gm BID, steroid taper. Good graft function.   Hold ASA 81, SQHep for hematuria, HCT drop.  BP stable.  Immuno: thymo 3/3, isma dose #1 yesterday, MMF 1gm BID, steroid taper.

## 2024-04-19 NOTE — DIETITIAN INITIAL EVALUATION ADULT - PHYSCIAL ASSESSMENT
Weights:  - Source: Patient   - UBW: 76.5 kg  - Reported weight changes:  Pt denies any recent weight changes     Current Admission Weights:  - Dosing weight:  74.6 kg/ 164.5 pounds  (24)  - Daily Weight in k.8 (-19), Weight in k.2 (-18), Weight in k.6 (-17)    Weight History per [facility] HIE:  - 78  kg/ 172 pounds (2/10/23, 23, 10/26/22)    Weight Change:  - Weight fluctuations in setting of fluid shifts (IVF, intraoperative fluids). Will continue to monitor weight trends as available/able.     IBW:  136 pounds   %IBW: 129%

## 2024-04-19 NOTE — DIETITIAN INITIAL EVALUATION ADULT - LITERATURE/VIDEOS GIVEN
Provided nutrition package including: USDA pamphlet on food safety in Cameroonian. food safety and BG control handouts(in Micronesian), fridge magnet with cooking temperatures, and RD information card.

## 2024-04-19 NOTE — DIETITIAN INITIAL EVALUATION ADULT - ADD RECOMMEND
1) Continue Consistent Carbohydrate diet  2) Encourage adequate PO intakes to promote surgical healing post-transplant.   3) Reinforce post-transplant nutrition therapy and food safety guidelines in-house and prior to discharge.   4) Discharge diet: Continue as above. Recommend follow up visit with Transplant MD and outpatient RD for dietary modifications as warranted.  5) Monitor PO intake,  Urine Output, GI tolerance, skin integrity,and labs. RD remains available if needed, pt is aware.

## 2024-04-19 NOTE — PROGRESS NOTE ADULT - ASSESSMENT
80 YO M with known CKD (2013), ESRD (2/2018) follows at [facility] dialysis unit [facility] dialysis unit. , DM (age 57) not on insulin; HTN (age 57). No known h/o kidney stone/ Prostatism. No hematuria/Nocturia/Transfusions. Urine out put: 1-2 cups/day. Has no h/o UTI. Pneumonia 25 years ago, was not hospitalized. No illness or hospitalization in the last 6 months. No history of kidney/ bladder/ prostate surgery. Non smoker. Quit 40 years ago, 1/2 ppd for 5 years. Left forearm AVG, Dr. Retana and Dr. Montes De Oca. Presents in usual state of health. Now S/P DDRT  POD#0.     [ ] s/p DDRT (POD#2)  - US Doppler:  Patent Vasculature.    - strict I&Os: GLADIS, UO via rubin   - PO diet  - SCDs/PT/IS  - Hold SQH, repeat CBC    [ ] Immunosuppression  - Nicole 1st dose 4/18/24 next dose due 4/22/24  - Thymo#3 dose today  -MMF 1/1, pred taper

## 2024-04-19 NOTE — PROGRESS NOTE ADULT - ASSESSMENT
78 YO M with IDDM (age 57); HTN (age 57), and ESRD (follows with Dr. Olivares at [facility] dialysis unit [facility] dialysis unit) presents as renal transplant candidate. Transplant nephrology service consulted for immunosuppressive management.     #s/p DDRT 4/17  - last HD 4/16  - Cr trending down  - UO 1.8/24h  - electrolytes wnl  - BP/volume status ok    #Immunosuppression  - Thymo induction with third dose 4/19  - s/p 1st Belatacept 10mg/kg on 4/18 and next dose due 4/22/24, MMF, steroid taper per protocol      Nely Fu DO  Nephrology Fellow  Feel free to contact me directly on TEAMS with any questions.  (After 5pm or on weekends, please call the on-call fellow).

## 2024-04-19 NOTE — DIETITIAN INITIAL EVALUATION ADULT - REASON INDICATOR FOR ASSESSMENT
Pt seen for post kidney transplant recipient nutrition evaluation per department protocol.   Information obtained from: Review of pt's current medical record, interview with pt in his assigned room on 6MONTI

## 2024-04-19 NOTE — PROGRESS NOTE ADULT - TIME BILLING
Kidney recipient post op day 2  Non oliguric   donor transplant, Thymo induction, Nicole/MMF/steroids maintenance  Not on CNI  Reviewed clinical, lab, imaging data  Reviewed immunosuppression, prophylaxis   Reviewed op note, pre transplant work up, native kidney disease  Suggestions  Continue std steroid taper  Thymo as per protocol  Monitor electrolytes, creatinine, Belatacept de susan protocol  Will follow  I was present during and reviewed clinical and lab data as well as assessment and plan as documented by the house staff as noted. Please contact if any additional questions with any change in clinical condition or on availability of any additional information or reports.

## 2024-04-19 NOTE — DIETITIAN INITIAL EVALUATION ADULT - PERTINENT LABORATORY DATA
04-19    139  |  103  |  72<H>  ----------------------------<  223<H>  4.4   |  21<L>  |  4.55<H>    Ca    9.2      19 Apr 2024 06:34  Phos  4.8     04-19  Mg     2.4     04-19    TPro  5.5<L>  /  Alb  3.0<L>  /  TBili  0.2  /  DBili  x   /  AST  26  /  ALT  17  /  AlkPhos  73  04-19    POCT Blood Glucose.: 202 mg/dL (04-19-24 @ 12:45)  POCT Blood Glucose.: 199 mg/dL (04-19-24 @ 08:33)  POCT Blood Glucose.: 327 mg/dL (04-18-24 @ 21:10)  POCT Blood Glucose.: 331 mg/dL (04-18-24 @ 17:45)    A1C with Estimated Average Glucose Result: 6.6 % (04-19-24 @ 06:35)  A1C with Estimated Average Glucose Result: 6.4 % (04-18-24 @ 06:42)

## 2024-04-19 NOTE — DIETITIAN INITIAL EVALUATION ADULT - OTHER INFO
Pt is s/p DDRT 4/17/24 POD#2:  -- Thymoglobulin, Solu-medrol,  Cellcept transplant meds ordered  -- BG management: A1c=6.6 % (04-19-24), indicates good glycemic control; ordered for SSI, Lispro 5 U 3x/day before meals, and Lantus 18 U a bedtime  -- Urine output per flow sheets 430 ml thus far (4/19), 1,860 ml (4/18), 790 ml (4/17)   -- Hyperphosphatemia noted

## 2024-04-19 NOTE — DIETITIAN INITIAL EVALUATION ADULT - REASON FOR ADMISSION
Chart Reviewed, Events noted  '78 YO M with IDDM ; HTN , and ESRD presents as renal transplant candidate. "

## 2024-04-19 NOTE — DIETITIAN INITIAL EVALUATION ADULT - ORAL INTAKE PTA/DIET HISTORY
Pt reports a fair appetite PTA, was following a low potassium and phosphorus diet at home. Reports occasional nausea/vomiting and diarrhea or constipation.  Took Vitamin D3  Once a month PTA.

## 2024-04-20 LAB
ALBUMIN SERPL ELPH-MCNC: 3.1 G/DL — LOW (ref 3.3–5)
ALP SERPL-CCNC: 73 U/L — SIGNIFICANT CHANGE UP (ref 40–120)
ALT FLD-CCNC: 26 U/L — SIGNIFICANT CHANGE UP (ref 10–45)
ANION GAP SERPL CALC-SCNC: 12 MMOL/L — SIGNIFICANT CHANGE UP (ref 5–17)
AST SERPL-CCNC: 31 U/L — SIGNIFICANT CHANGE UP (ref 10–40)
BASOPHILS # BLD AUTO: 0.01 K/UL — SIGNIFICANT CHANGE UP (ref 0–0.2)
BASOPHILS NFR BLD AUTO: 0.2 % — SIGNIFICANT CHANGE UP (ref 0–2)
BILIRUB SERPL-MCNC: 0.3 MG/DL — SIGNIFICANT CHANGE UP (ref 0.2–1.2)
BLD GP AB SCN SERPL QL: NEGATIVE — SIGNIFICANT CHANGE UP
BUN SERPL-MCNC: 71 MG/DL — HIGH (ref 7–23)
CALCIUM SERPL-MCNC: 8.9 MG/DL — SIGNIFICANT CHANGE UP (ref 8.4–10.5)
CHLORIDE SERPL-SCNC: 104 MMOL/L — SIGNIFICANT CHANGE UP (ref 96–108)
CO2 SERPL-SCNC: 22 MMOL/L — SIGNIFICANT CHANGE UP (ref 22–31)
CREAT SERPL-MCNC: 3.1 MG/DL — HIGH (ref 0.5–1.3)
EGFR: 20 ML/MIN/1.73M2 — LOW
EOSINOPHIL # BLD AUTO: 0 K/UL — SIGNIFICANT CHANGE UP (ref 0–0.5)
EOSINOPHIL NFR BLD AUTO: 0 % — SIGNIFICANT CHANGE UP (ref 0–6)
GLUCOSE BLDC GLUCOMTR-MCNC: 236 MG/DL — HIGH (ref 70–99)
GLUCOSE BLDC GLUCOMTR-MCNC: 254 MG/DL — HIGH (ref 70–99)
GLUCOSE BLDC GLUCOMTR-MCNC: 256 MG/DL — HIGH (ref 70–99)
GLUCOSE BLDC GLUCOMTR-MCNC: 270 MG/DL — HIGH (ref 70–99)
GLUCOSE SERPL-MCNC: 241 MG/DL — HIGH (ref 70–99)
HCT VFR BLD CALC: 29.5 % — LOW (ref 39–50)
HGB BLD-MCNC: 9.5 G/DL — LOW (ref 13–17)
IMM GRANULOCYTES NFR BLD AUTO: 0.3 % — SIGNIFICANT CHANGE UP (ref 0–0.9)
LYMPHOCYTES # BLD AUTO: 0.11 K/UL — LOW (ref 1–3.3)
LYMPHOCYTES # BLD AUTO: 1.7 % — LOW (ref 13–44)
MAGNESIUM SERPL-MCNC: 2.2 MG/DL — SIGNIFICANT CHANGE UP (ref 1.6–2.6)
MCHC RBC-ENTMCNC: 31.5 PG — SIGNIFICANT CHANGE UP (ref 27–34)
MCHC RBC-ENTMCNC: 32.2 GM/DL — SIGNIFICANT CHANGE UP (ref 32–36)
MCV RBC AUTO: 97.7 FL — SIGNIFICANT CHANGE UP (ref 80–100)
MONOCYTES # BLD AUTO: 0.48 K/UL — SIGNIFICANT CHANGE UP (ref 0–0.9)
MONOCYTES NFR BLD AUTO: 7.5 % — SIGNIFICANT CHANGE UP (ref 2–14)
NEUTROPHILS # BLD AUTO: 5.79 K/UL — SIGNIFICANT CHANGE UP (ref 1.8–7.4)
NEUTROPHILS NFR BLD AUTO: 90.3 % — HIGH (ref 43–77)
NRBC # BLD: 0 /100 WBCS — SIGNIFICANT CHANGE UP (ref 0–0)
PHOSPHATE SERPL-MCNC: 3.5 MG/DL — SIGNIFICANT CHANGE UP (ref 2.5–4.5)
PLATELET # BLD AUTO: 89 K/UL — LOW (ref 150–400)
POTASSIUM SERPL-MCNC: 4.5 MMOL/L — SIGNIFICANT CHANGE UP (ref 3.5–5.3)
POTASSIUM SERPL-SCNC: 4.5 MMOL/L — SIGNIFICANT CHANGE UP (ref 3.5–5.3)
PROT SERPL-MCNC: 5.2 G/DL — LOW (ref 6–8.3)
RBC # BLD: 3.02 M/UL — LOW (ref 4.2–5.8)
RBC # FLD: 16.1 % — HIGH (ref 10.3–14.5)
RH IG SCN BLD-IMP: POSITIVE — SIGNIFICANT CHANGE UP
SODIUM SERPL-SCNC: 138 MMOL/L — SIGNIFICANT CHANGE UP (ref 135–145)
WBC # BLD: 6.41 K/UL — SIGNIFICANT CHANGE UP (ref 3.8–10.5)
WBC # FLD AUTO: 6.41 K/UL — SIGNIFICANT CHANGE UP (ref 3.8–10.5)

## 2024-04-20 PROCEDURE — 99233 SBSQ HOSP IP/OBS HIGH 50: CPT

## 2024-04-20 PROCEDURE — 99232 SBSQ HOSP IP/OBS MODERATE 35: CPT | Mod: GC,24

## 2024-04-20 RX ORDER — INSULIN GLARGINE 100 [IU]/ML
20 INJECTION, SOLUTION SUBCUTANEOUS AT BEDTIME
Refills: 0 | Status: DISCONTINUED | OUTPATIENT
Start: 2024-04-20 | End: 2024-04-22

## 2024-04-20 RX ORDER — VALGANCICLOVIR 450 MG/1
450 TABLET, FILM COATED ORAL DAILY
Refills: 0 | Status: DISCONTINUED | OUTPATIENT
Start: 2024-04-20 | End: 2024-04-22

## 2024-04-20 RX ORDER — HEPARIN SODIUM 5000 [USP'U]/ML
5000 INJECTION INTRAVENOUS; SUBCUTANEOUS EVERY 12 HOURS
Refills: 0 | Status: DISCONTINUED | OUTPATIENT
Start: 2024-04-20 | End: 2024-04-21

## 2024-04-20 RX ORDER — INSULIN LISPRO 100/ML
10 VIAL (ML) SUBCUTANEOUS
Refills: 0 | Status: DISCONTINUED | OUTPATIENT
Start: 2024-04-20 | End: 2024-04-22

## 2024-04-20 RX ORDER — CARVEDILOL PHOSPHATE 80 MG/1
6.25 CAPSULE, EXTENDED RELEASE ORAL EVERY 12 HOURS
Refills: 0 | Status: DISCONTINUED | OUTPATIENT
Start: 2024-04-20 | End: 2024-04-21

## 2024-04-20 RX ADMIN — Medication 5 UNIT(S): at 09:04

## 2024-04-20 RX ADMIN — Medication 1 TABLET(S): at 11:15

## 2024-04-20 RX ADMIN — HEPARIN SODIUM 5000 UNIT(S): 5000 INJECTION INTRAVENOUS; SUBCUTANEOUS at 17:18

## 2024-04-20 RX ADMIN — Medication 500000 UNIT(S): at 05:07

## 2024-04-20 RX ADMIN — Medication 6: at 13:19

## 2024-04-20 RX ADMIN — Medication 100 MILLIGRAM(S): at 11:16

## 2024-04-20 RX ADMIN — Medication 5 UNIT(S): at 13:19

## 2024-04-20 RX ADMIN — FAMOTIDINE 20 MILLIGRAM(S): 10 INJECTION INTRAVENOUS at 11:15

## 2024-04-20 RX ADMIN — Medication 6: at 17:54

## 2024-04-20 RX ADMIN — Medication 60 MILLIGRAM(S): at 05:07

## 2024-04-20 RX ADMIN — SENNA PLUS 2 TABLET(S): 8.6 TABLET ORAL at 21:33

## 2024-04-20 RX ADMIN — MYCOPHENOLATE MOFETIL 1 GRAM(S): 250 CAPSULE ORAL at 19:54

## 2024-04-20 RX ADMIN — Medication 500000 UNIT(S): at 17:18

## 2024-04-20 RX ADMIN — VALGANCICLOVIR 450 MILLIGRAM(S): 450 TABLET, FILM COATED ORAL at 11:15

## 2024-04-20 RX ADMIN — ESCITALOPRAM OXALATE 10 MILLIGRAM(S): 10 TABLET, FILM COATED ORAL at 11:15

## 2024-04-20 RX ADMIN — MYCOPHENOLATE MOFETIL 1 GRAM(S): 250 CAPSULE ORAL at 07:49

## 2024-04-20 RX ADMIN — Medication 500000 UNIT(S): at 11:14

## 2024-04-20 RX ADMIN — ATORVASTATIN CALCIUM 40 MILLIGRAM(S): 80 TABLET, FILM COATED ORAL at 21:33

## 2024-04-20 RX ADMIN — Medication 6: at 09:04

## 2024-04-20 RX ADMIN — CARVEDILOL PHOSPHATE 6.25 MILLIGRAM(S): 80 CAPSULE, EXTENDED RELEASE ORAL at 17:54

## 2024-04-20 RX ADMIN — INSULIN GLARGINE 20 UNIT(S): 100 INJECTION, SOLUTION SUBCUTANEOUS at 21:33

## 2024-04-20 RX ADMIN — Medication 10 UNIT(S): at 17:55

## 2024-04-20 RX ADMIN — CHLORHEXIDINE GLUCONATE 1 APPLICATION(S): 213 SOLUTION TOPICAL at 11:14

## 2024-04-20 NOTE — PROGRESS NOTE ADULT - ASSESSMENT
79 year old man with h/o longstanding Diabetes II on insulin and HTN, ESRD on HD since 2018.   Received  donor kidney transplant from a 48 year old, Hep C +ve donor, KDPI 87%  Smooth post op course, doing well, creatinine falling, good UOP. Keep rubin in place, monitor hematuria.     S/p DDRT on 24   - UOP 2 liters, Creatinine improving. Electrolytes fair. Euvolemic. No need for dialysis.     Immunosuppression   - Thymo induction completed  - Denovo Belatacept to minimize CNI toxicity risk given vascular sclerosis seen on donor bx, 1st Belatacept 10mg/kg on  and next dose due 24  - MMF 1 gram po bid, steroid taper per protocol    Infection prophylaxis   Valcyte 450mg po daily - CMV intermediate risk D+/R+  Nystatin swish and swallow 4x/day   Bactrim SS daily     Diabetes type II on insulin   - Continue to monitor, adjust Lantus Admelog as needed     HTN : Continue to monitor BP, if SBP > 160 start Coreg 12.5 mg po bid  HLD : continue atorvastatin 40mg po qhs   BPH - resume Flomax 0.4mg po qhs   Anemia - mild, continue to monitor for now   Hep C +ve donor - send Hep C PCR prior to discharge

## 2024-04-20 NOTE — PROGRESS NOTE ADULT - ASSESSMENT
78 YO M with known CKD (2013), ESRD (2/2018) follows at [facility] dialysis unit [facility] dialysis unit. , DM (age 57) not on insulin; HTN (age 57). No known h/o kidney stone/ Prostatism. No hematuria/Nocturia/Transfusions. Urine out put: 1-2 cups/day. Has no h/o UTI. Pneumonia 25 years ago, was not hospitalized. No illness or hospitalization in the last 6 months. No history of kidney/ bladder/ prostate surgery. Non smoker. Quit 40 years ago, 1/2 ppd for 5 years. Left forearm AVG, Dr. Retana and Dr. Montes De Oca. Presents in usual state of health. Now S/P DDRT  POD#0.     [ ] s/p DDRT (POD#3)  - US Doppler:  Patent Vasculature.    - strict I&Os: GLADIS, UO via rubin   - PO diet  - SCDs/PT/IS  - resume SQH  - SOLU 60 today  - Valcyte daily      [ ] Immunosuppression  - Nicole 1st dose 4/18/24 next dose due 4/22/24  - Completed thymo induction  -MMF 1/1, pred taper

## 2024-04-21 LAB
ALBUMIN SERPL ELPH-MCNC: 3 G/DL — LOW (ref 3.3–5)
ALP SERPL-CCNC: 80 U/L — SIGNIFICANT CHANGE UP (ref 40–120)
ALT FLD-CCNC: 28 U/L — SIGNIFICANT CHANGE UP (ref 10–45)
ANION GAP SERPL CALC-SCNC: 10 MMOL/L — SIGNIFICANT CHANGE UP (ref 5–17)
AST SERPL-CCNC: 26 U/L — SIGNIFICANT CHANGE UP (ref 10–40)
BASOPHILS # BLD AUTO: 0 K/UL — SIGNIFICANT CHANGE UP (ref 0–0.2)
BASOPHILS NFR BLD AUTO: 0 % — SIGNIFICANT CHANGE UP (ref 0–2)
BILIRUB SERPL-MCNC: 0.4 MG/DL — SIGNIFICANT CHANGE UP (ref 0.2–1.2)
BUN SERPL-MCNC: 61 MG/DL — HIGH (ref 7–23)
CALCIUM SERPL-MCNC: 8.8 MG/DL — SIGNIFICANT CHANGE UP (ref 8.4–10.5)
CHLORIDE SERPL-SCNC: 108 MMOL/L — SIGNIFICANT CHANGE UP (ref 96–108)
CO2 SERPL-SCNC: 21 MMOL/L — LOW (ref 22–31)
CREAT SERPL-MCNC: 2.15 MG/DL — HIGH (ref 0.5–1.3)
EGFR: 31 ML/MIN/1.73M2 — LOW
EOSINOPHIL # BLD AUTO: 0.05 K/UL — SIGNIFICANT CHANGE UP (ref 0–0.5)
EOSINOPHIL NFR BLD AUTO: 1 % — SIGNIFICANT CHANGE UP (ref 0–6)
GLUCOSE BLDC GLUCOMTR-MCNC: 187 MG/DL — HIGH (ref 70–99)
GLUCOSE BLDC GLUCOMTR-MCNC: 192 MG/DL — HIGH (ref 70–99)
GLUCOSE BLDC GLUCOMTR-MCNC: 205 MG/DL — HIGH (ref 70–99)
GLUCOSE BLDC GLUCOMTR-MCNC: 235 MG/DL — HIGH (ref 70–99)
GLUCOSE SERPL-MCNC: 160 MG/DL — HIGH (ref 70–99)
HCT VFR BLD CALC: 29.3 % — LOW (ref 39–50)
HGB BLD-MCNC: 9.4 G/DL — LOW (ref 13–17)
IMM GRANULOCYTES NFR BLD AUTO: 0.6 % — SIGNIFICANT CHANGE UP (ref 0–0.9)
LYMPHOCYTES # BLD AUTO: 0.24 K/UL — LOW (ref 1–3.3)
LYMPHOCYTES # BLD AUTO: 5 % — LOW (ref 13–44)
MAGNESIUM SERPL-MCNC: 2 MG/DL — SIGNIFICANT CHANGE UP (ref 1.6–2.6)
MCHC RBC-ENTMCNC: 31.4 PG — SIGNIFICANT CHANGE UP (ref 27–34)
MCHC RBC-ENTMCNC: 32.1 GM/DL — SIGNIFICANT CHANGE UP (ref 32–36)
MCV RBC AUTO: 98 FL — SIGNIFICANT CHANGE UP (ref 80–100)
MONOCYTES # BLD AUTO: 0.46 K/UL — SIGNIFICANT CHANGE UP (ref 0–0.9)
MONOCYTES NFR BLD AUTO: 9.5 % — SIGNIFICANT CHANGE UP (ref 2–14)
NEUTROPHILS # BLD AUTO: 4.04 K/UL — SIGNIFICANT CHANGE UP (ref 1.8–7.4)
NEUTROPHILS NFR BLD AUTO: 83.9 % — HIGH (ref 43–77)
NRBC # BLD: 0 /100 WBCS — SIGNIFICANT CHANGE UP (ref 0–0)
PHOSPHATE SERPL-MCNC: 2.9 MG/DL — SIGNIFICANT CHANGE UP (ref 2.5–4.5)
PLATELET # BLD AUTO: 86 K/UL — LOW (ref 150–400)
POTASSIUM SERPL-MCNC: 4.3 MMOL/L — SIGNIFICANT CHANGE UP (ref 3.5–5.3)
POTASSIUM SERPL-SCNC: 4.3 MMOL/L — SIGNIFICANT CHANGE UP (ref 3.5–5.3)
PROT SERPL-MCNC: 5.1 G/DL — LOW (ref 6–8.3)
RBC # BLD: 2.99 M/UL — LOW (ref 4.2–5.8)
RBC # FLD: 16.1 % — HIGH (ref 10.3–14.5)
SODIUM SERPL-SCNC: 139 MMOL/L — SIGNIFICANT CHANGE UP (ref 135–145)
WBC # BLD: 4.82 K/UL — SIGNIFICANT CHANGE UP (ref 3.8–10.5)
WBC # FLD AUTO: 4.82 K/UL — SIGNIFICANT CHANGE UP (ref 3.8–10.5)

## 2024-04-21 PROCEDURE — 99233 SBSQ HOSP IP/OBS HIGH 50: CPT

## 2024-04-21 RX ORDER — CARVEDILOL PHOSPHATE 80 MG/1
12.5 CAPSULE, EXTENDED RELEASE ORAL EVERY 12 HOURS
Refills: 0 | Status: DISCONTINUED | OUTPATIENT
Start: 2024-04-21 | End: 2024-04-22

## 2024-04-21 RX ORDER — HEPARIN SODIUM 5000 [USP'U]/ML
5000 INJECTION INTRAVENOUS; SUBCUTANEOUS EVERY 12 HOURS
Refills: 0 | Status: DISCONTINUED | OUTPATIENT
Start: 2024-04-21 | End: 2024-04-22

## 2024-04-21 RX ORDER — NIFEDIPINE 30 MG
30 TABLET, EXTENDED RELEASE 24 HR ORAL DAILY
Refills: 0 | Status: DISCONTINUED | OUTPATIENT
Start: 2024-04-21 | End: 2024-04-22

## 2024-04-21 RX ORDER — HYDRALAZINE HCL 50 MG
10 TABLET ORAL ONCE
Refills: 0 | Status: COMPLETED | OUTPATIENT
Start: 2024-04-21 | End: 2024-04-21

## 2024-04-21 RX ADMIN — Medication 30 MILLIGRAM(S): at 05:21

## 2024-04-21 RX ADMIN — Medication 10 UNIT(S): at 12:47

## 2024-04-21 RX ADMIN — ATORVASTATIN CALCIUM 40 MILLIGRAM(S): 80 TABLET, FILM COATED ORAL at 21:05

## 2024-04-21 RX ADMIN — Medication 500000 UNIT(S): at 17:20

## 2024-04-21 RX ADMIN — VALGANCICLOVIR 450 MILLIGRAM(S): 450 TABLET, FILM COATED ORAL at 11:54

## 2024-04-21 RX ADMIN — Medication 500000 UNIT(S): at 01:14

## 2024-04-21 RX ADMIN — Medication 2: at 08:31

## 2024-04-21 RX ADMIN — Medication 500000 UNIT(S): at 11:54

## 2024-04-21 RX ADMIN — Medication 1 TABLET(S): at 11:55

## 2024-04-21 RX ADMIN — ESCITALOPRAM OXALATE 10 MILLIGRAM(S): 10 TABLET, FILM COATED ORAL at 11:56

## 2024-04-21 RX ADMIN — HEPARIN SODIUM 5000 UNIT(S): 5000 INJECTION INTRAVENOUS; SUBCUTANEOUS at 17:20

## 2024-04-21 RX ADMIN — FAMOTIDINE 20 MILLIGRAM(S): 10 INJECTION INTRAVENOUS at 11:55

## 2024-04-21 RX ADMIN — INSULIN GLARGINE 20 UNIT(S): 100 INJECTION, SOLUTION SUBCUTANEOUS at 21:05

## 2024-04-21 RX ADMIN — SENNA PLUS 2 TABLET(S): 8.6 TABLET ORAL at 21:05

## 2024-04-21 RX ADMIN — CARVEDILOL PHOSPHATE 6.25 MILLIGRAM(S): 80 CAPSULE, EXTENDED RELEASE ORAL at 05:21

## 2024-04-21 RX ADMIN — HEPARIN SODIUM 5000 UNIT(S): 5000 INJECTION INTRAVENOUS; SUBCUTANEOUS at 05:21

## 2024-04-21 RX ADMIN — CHLORHEXIDINE GLUCONATE 1 APPLICATION(S): 213 SOLUTION TOPICAL at 12:08

## 2024-04-21 RX ADMIN — MYCOPHENOLATE MOFETIL 1 GRAM(S): 250 CAPSULE ORAL at 07:42

## 2024-04-21 RX ADMIN — Medication 500000 UNIT(S): at 05:21

## 2024-04-21 RX ADMIN — Medication 10 MILLIGRAM(S): at 01:37

## 2024-04-21 RX ADMIN — Medication 40 MILLIGRAM(S): at 05:21

## 2024-04-21 RX ADMIN — CARVEDILOL PHOSPHATE 12.5 MILLIGRAM(S): 80 CAPSULE, EXTENDED RELEASE ORAL at 17:20

## 2024-04-21 RX ADMIN — Medication 10 UNIT(S): at 17:42

## 2024-04-21 RX ADMIN — MYCOPHENOLATE MOFETIL 1 GRAM(S): 250 CAPSULE ORAL at 20:11

## 2024-04-21 RX ADMIN — Medication 100 MILLIGRAM(S): at 11:57

## 2024-04-21 RX ADMIN — Medication 10 UNIT(S): at 08:31

## 2024-04-21 RX ADMIN — Medication 4: at 17:41

## 2024-04-21 RX ADMIN — Medication 4: at 12:47

## 2024-04-21 NOTE — PROGRESS NOTE ADULT - ASSESSMENT
79 year old man with h/o longstanding Diabetes II on insulin and HTN, ESRD on HD since 2018.   Received  donor kidney transplant from a 48 year old, Hep C +ve donor, KDPI 87%  Smooth post op course, doing well, creatinine falling, good UOP. Keep rubin in place, monitor hematuria.     S/p DDRT on 24   - Good UOP,  Creatinine improving. Electrolytes fair. Euvolemic. No need for dialysis.     Immunosuppression   - Thymo induction completed  - Denovo Belatacept to minimize CNI toxicity risk given vascular sclerosis seen on donor bx, 1st Belatacept 10mg/kg on  and next dose due 24  - MMF 1 gram po bid, steroid taper per protocol    Infection prophylaxis   Valcyte 450mg po daily - CMV intermediate risk D+/R+  Nystatin swish and swallow 4x/day   Bactrim SS daily     Diabetes type II on insulin   - Continue to monitor, adjust Lantus Admelog as needed     HTN : Continue Coreg 6.25mg po bid, Nifedipine 30mg daily. BP goal 120-150 systolic. Avoid excessive lowering to allow better allograft perfusion.   HLD : continue atorvastatin 40mg po qhs   BPH - resume Flomax 0.4mg po qhs   Anemia - mild, continue to monitor for now   Hep C +ve donor - Hep C PCR sent today     D/c planning tomorrow after Nicole

## 2024-04-21 NOTE — PROGRESS NOTE ADULT - ASSESSMENT
78 YO M with known CKD (2013), ESRD (2/2018) follows at [facility] dialysis unit [facility] dialysis unit. , DM (age 57) not on insulin; HTN (age 57). No known h/o kidney stone/ Prostatism. No hematuria/Nocturia/Transfusions. Urine out put: 1-2 cups/day. Has no h/o UTI. Pneumonia 25 years ago, was not hospitalized. No illness or hospitalization in the last 6 months. No history of kidney/ bladder/ prostate surgery. Non smoker. Quit 40 years ago, 1/2 ppd for 5 years. Left forearm AVG, Dr. Retana and Dr. Montes De Oca. Presents in usual state of health. Now S/P DDRT  POD#0.     [ ] s/p DDRT (POD#4)  - US Doppler:  Patent Vasculature.    - strict I&Os: GLADIS, UO via rubin - dc rubin  - PO diet  - SCDs/PT/IS  - SQH      [ ] Immunosuppression  - Nicole 1st dose 4/18/24 next dose due 4/22/24  - Completed thymo induction  -MMF 1/1, pred taper  - ppx: nystatin, valcyte, bactrim     80 YO M with known CKD (2013), ESRD (2/2018) follows at [facility] dialysis unit [facility] dialysis unit. , DM (age 57) not on insulin; HTN (age 57). No known h/o kidney stone/ Prostatism. No hematuria/Nocturia/Transfusions. Urine out put: 1-2 cups/day. Has no h/o UTI. Pneumonia 25 years ago, was not hospitalized. No illness or hospitalization in the last 6 months. No history of kidney/ bladder/ prostate surgery. Non smoker. Quit 40 years ago, 1/2 ppd for 5 years. Left forearm AVG, Dr. Retana and Dr. Montes De Oca. Presents in usual state of health. Now S/P DDRT  POD#0.     [ ] s/p DDRT (POD#4)  - US Doppler:  Patent Vasculature.    - strict I&Os: GLADIS, UO via rubin   - PO diet  - SCDs/PT/IS  - SQH      [ ] Immunosuppression  - Nicole 1st dose 4/18/24 next dose due 4/22/24  - Completed thymo induction  -MMF 1/1, pred taper  - ppx: nystatin, valcyte, bactrim     80 YO M with known CKD (2013), ESRD (2/2018) follows at [facility] dialysis unit [facility] dialysis unit. , DM (age 57) not on insulin; HTN (age 57). No known h/o kidney stone/ Prostatism. No hematuria/Nocturia/Transfusions. Urine out put: 1-2 cups/day. Has no h/o UTI. Pneumonia 25 years ago, was not hospitalized. No illness or hospitalization in the last 6 months. No history of kidney/ bladder/ prostate surgery. Non smoker. Quit 40 years ago, 1/2 ppd for 5 years. Left forearm AVG, Dr. Retana and Dr. Montes De Oca. Presents in usual state of health. Now S/P DDRT  POD#0.     [ ] s/p DDRT (POD#4) 1a/1v/1u stented  - US Doppler:  Patent Vasculature.    - strict I&Os: GLADIS, UO via rubin   - PO diet  - SCDs/PT/IS  - SQH    [ ] Immunosuppression  - Nicole 1st dose 4/18/24 next dose due 4/22/24  - Completed thymo induction  -MMF 1/1, pred taper  - ppx: nystatin, valcyte, bactrim    [] Donor HCV JUDIE +  - FU HCV PCR and genotype

## 2024-04-22 ENCOUNTER — TRANSCRIPTION ENCOUNTER (OUTPATIENT)
Age: 79
End: 2024-04-22

## 2024-04-22 VITALS
DIASTOLIC BLOOD PRESSURE: 75 MMHG | HEART RATE: 80 BPM | TEMPERATURE: 99 F | OXYGEN SATURATION: 96 % | RESPIRATION RATE: 18 BRPM | SYSTOLIC BLOOD PRESSURE: 165 MMHG

## 2024-04-22 LAB
ALBUMIN SERPL ELPH-MCNC: 3.2 G/DL — LOW (ref 3.3–5)
ALP SERPL-CCNC: 84 U/L — SIGNIFICANT CHANGE UP (ref 40–120)
ALT FLD-CCNC: 36 U/L — SIGNIFICANT CHANGE UP (ref 10–45)
ANION GAP SERPL CALC-SCNC: 12 MMOL/L — SIGNIFICANT CHANGE UP (ref 5–17)
AST SERPL-CCNC: 31 U/L — SIGNIFICANT CHANGE UP (ref 10–40)
BASOPHILS # BLD AUTO: 0.01 K/UL — SIGNIFICANT CHANGE UP (ref 0–0.2)
BASOPHILS NFR BLD AUTO: 0.2 % — SIGNIFICANT CHANGE UP (ref 0–2)
BILIRUB SERPL-MCNC: 0.6 MG/DL — SIGNIFICANT CHANGE UP (ref 0.2–1.2)
BUN SERPL-MCNC: 58 MG/DL — HIGH (ref 7–23)
CALCIUM SERPL-MCNC: 8.9 MG/DL — SIGNIFICANT CHANGE UP (ref 8.4–10.5)
CHLORIDE SERPL-SCNC: 107 MMOL/L — SIGNIFICANT CHANGE UP (ref 96–108)
CO2 SERPL-SCNC: 20 MMOL/L — LOW (ref 22–31)
CREAT SERPL-MCNC: 1.84 MG/DL — HIGH (ref 0.5–1.3)
EGFR: 37 ML/MIN/1.73M2 — LOW
EOSINOPHIL # BLD AUTO: 0.06 K/UL — SIGNIFICANT CHANGE UP (ref 0–0.5)
EOSINOPHIL NFR BLD AUTO: 1.2 % — SIGNIFICANT CHANGE UP (ref 0–6)
GLUCOSE BLDC GLUCOMTR-MCNC: 133 MG/DL — HIGH (ref 70–99)
GLUCOSE BLDC GLUCOMTR-MCNC: 221 MG/DL — HIGH (ref 70–99)
GLUCOSE SERPL-MCNC: 93 MG/DL — SIGNIFICANT CHANGE UP (ref 70–99)
HCT VFR BLD CALC: 29.2 % — LOW (ref 39–50)
HCV RNA SERPL NAA DL=5-ACNC: 191 IU/ML — SIGNIFICANT CHANGE UP
HCV RNA SPEC NAA+PROBE-LOG IU: 2.28 LOGIU/ML — SIGNIFICANT CHANGE UP
HCV RNA SPEC NAA+PROBE-LOG IU: DETECTED
HGB BLD-MCNC: 9.5 G/DL — LOW (ref 13–17)
IMM GRANULOCYTES NFR BLD AUTO: 0.6 % — SIGNIFICANT CHANGE UP (ref 0–0.9)
LYMPHOCYTES # BLD AUTO: 0.31 K/UL — LOW (ref 1–3.3)
LYMPHOCYTES # BLD AUTO: 6.2 % — LOW (ref 13–44)
MAGNESIUM SERPL-MCNC: 2 MG/DL — SIGNIFICANT CHANGE UP (ref 1.6–2.6)
MCHC RBC-ENTMCNC: 31.5 PG — SIGNIFICANT CHANGE UP (ref 27–34)
MCHC RBC-ENTMCNC: 32.5 GM/DL — SIGNIFICANT CHANGE UP (ref 32–36)
MCV RBC AUTO: 96.7 FL — SIGNIFICANT CHANGE UP (ref 80–100)
MONOCYTES # BLD AUTO: 0.47 K/UL — SIGNIFICANT CHANGE UP (ref 0–0.9)
MONOCYTES NFR BLD AUTO: 9.3 % — SIGNIFICANT CHANGE UP (ref 2–14)
NEUTROPHILS # BLD AUTO: 4.15 K/UL — SIGNIFICANT CHANGE UP (ref 1.8–7.4)
NEUTROPHILS NFR BLD AUTO: 82.5 % — HIGH (ref 43–77)
NRBC # BLD: 0 /100 WBCS — SIGNIFICANT CHANGE UP (ref 0–0)
PHOSPHATE SERPL-MCNC: 3 MG/DL — SIGNIFICANT CHANGE UP (ref 2.5–4.5)
PLATELET # BLD AUTO: 107 K/UL — LOW (ref 150–400)
POTASSIUM SERPL-MCNC: 4.6 MMOL/L — SIGNIFICANT CHANGE UP (ref 3.5–5.3)
POTASSIUM SERPL-SCNC: 4.6 MMOL/L — SIGNIFICANT CHANGE UP (ref 3.5–5.3)
PROT SERPL-MCNC: 5.2 G/DL — LOW (ref 6–8.3)
RBC # BLD: 3.02 M/UL — LOW (ref 4.2–5.8)
RBC # FLD: 15.9 % — HIGH (ref 10.3–14.5)
SODIUM SERPL-SCNC: 139 MMOL/L — SIGNIFICANT CHANGE UP (ref 135–145)
WBC # BLD: 5.03 K/UL — SIGNIFICANT CHANGE UP (ref 3.8–10.5)
WBC # FLD AUTO: 5.03 K/UL — SIGNIFICANT CHANGE UP (ref 3.8–10.5)

## 2024-04-22 PROCEDURE — P9040: CPT

## 2024-04-22 PROCEDURE — 84295 ASSAY OF SERUM SODIUM: CPT

## 2024-04-22 PROCEDURE — 85018 HEMOGLOBIN: CPT

## 2024-04-22 PROCEDURE — 82947 ASSAY GLUCOSE BLOOD QUANT: CPT

## 2024-04-22 PROCEDURE — C1769: CPT

## 2024-04-22 PROCEDURE — 85610 PROTHROMBIN TIME: CPT

## 2024-04-22 PROCEDURE — 97116 GAIT TRAINING THERAPY: CPT

## 2024-04-22 PROCEDURE — 86803 HEPATITIS C AB TEST: CPT

## 2024-04-22 PROCEDURE — 85025 COMPLETE CBC W/AUTO DIFF WBC: CPT

## 2024-04-22 PROCEDURE — 86900 BLOOD TYPING SEROLOGIC ABO: CPT

## 2024-04-22 PROCEDURE — 83605 ASSAY OF LACTIC ACID: CPT

## 2024-04-22 PROCEDURE — 86850 RBC ANTIBODY SCREEN: CPT

## 2024-04-22 PROCEDURE — 80053 COMPREHEN METABOLIC PANEL: CPT

## 2024-04-22 PROCEDURE — 82962 GLUCOSE BLOOD TEST: CPT

## 2024-04-22 PROCEDURE — 85384 FIBRINOGEN ACTIVITY: CPT

## 2024-04-22 PROCEDURE — 85730 THROMBOPLASTIN TIME PARTIAL: CPT

## 2024-04-22 PROCEDURE — 82435 ASSAY OF BLOOD CHLORIDE: CPT

## 2024-04-22 PROCEDURE — 97530 THERAPEUTIC ACTIVITIES: CPT

## 2024-04-22 PROCEDURE — 97161 PT EVAL LOW COMPLEX 20 MIN: CPT

## 2024-04-22 PROCEDURE — 82803 BLOOD GASES ANY COMBINATION: CPT

## 2024-04-22 PROCEDURE — 86923 COMPATIBILITY TEST ELECTRIC: CPT

## 2024-04-22 PROCEDURE — 93005 ELECTROCARDIOGRAM TRACING: CPT

## 2024-04-22 PROCEDURE — 87389 HIV-1 AG W/HIV-1&-2 AB AG IA: CPT

## 2024-04-22 PROCEDURE — 85014 HEMATOCRIT: CPT

## 2024-04-22 PROCEDURE — 82330 ASSAY OF CALCIUM: CPT

## 2024-04-22 PROCEDURE — 86901 BLOOD TYPING SEROLOGIC RH(D): CPT

## 2024-04-22 PROCEDURE — 86706 HEP B SURFACE ANTIBODY: CPT

## 2024-04-22 PROCEDURE — 71045 X-RAY EXAM CHEST 1 VIEW: CPT

## 2024-04-22 PROCEDURE — 36415 COLL VENOUS BLD VENIPUNCTURE: CPT

## 2024-04-22 PROCEDURE — 87902 NFCT AGT GNTYP ALYS HEP C: CPT

## 2024-04-22 PROCEDURE — 84100 ASSAY OF PHOSPHORUS: CPT

## 2024-04-22 PROCEDURE — 84132 ASSAY OF SERUM POTASSIUM: CPT

## 2024-04-22 PROCEDURE — 87522 HEPATITIS C REVRS TRNSCRPJ: CPT

## 2024-04-22 PROCEDURE — 87340 HEPATITIS B SURFACE AG IA: CPT

## 2024-04-22 PROCEDURE — 36430 TRANSFUSION BLD/BLD COMPNT: CPT

## 2024-04-22 PROCEDURE — 83735 ASSAY OF MAGNESIUM: CPT

## 2024-04-22 PROCEDURE — 99232 SBSQ HOSP IP/OBS MODERATE 35: CPT | Mod: GC

## 2024-04-22 PROCEDURE — 87635 SARS-COV-2 COVID-19 AMP PRB: CPT

## 2024-04-22 PROCEDURE — 82565 ASSAY OF CREATININE: CPT

## 2024-04-22 PROCEDURE — 76776 US EXAM K TRANSPL W/DOPPLER: CPT

## 2024-04-22 PROCEDURE — C2617: CPT

## 2024-04-22 PROCEDURE — 86704 HEP B CORE ANTIBODY TOTAL: CPT

## 2024-04-22 PROCEDURE — 74018 RADEX ABDOMEN 1 VIEW: CPT

## 2024-04-22 PROCEDURE — 85027 COMPLETE CBC AUTOMATED: CPT

## 2024-04-22 PROCEDURE — 83036 HEMOGLOBIN GLYCOSYLATED A1C: CPT

## 2024-04-22 RX ORDER — ALLOPURINOL 300 MG
1 TABLET ORAL
Qty: 0 | Refills: 0 | DISCHARGE
Start: 2024-04-22

## 2024-04-22 RX ORDER — ESCITALOPRAM OXALATE 10 MG/1
1 TABLET, FILM COATED ORAL
Qty: 30 | Refills: 0
Start: 2024-04-22 | End: 2024-05-21

## 2024-04-22 RX ORDER — VALGANCICLOVIR 450 MG/1
1 TABLET, FILM COATED ORAL
Qty: 0 | Refills: 0 | DISCHARGE
Start: 2024-04-22

## 2024-04-22 RX ORDER — FUROSEMIDE 40 MG
1 TABLET ORAL
Qty: 0 | Refills: 0 | DISCHARGE

## 2024-04-22 RX ORDER — NIFEDIPINE 30 MG
1 TABLET, EXTENDED RELEASE 24 HR ORAL
Qty: 30 | Refills: 0
Start: 2024-04-22 | End: 2024-05-21

## 2024-04-22 RX ORDER — ATORVASTATIN CALCIUM 80 MG/1
1 TABLET, FILM COATED ORAL
Qty: 0 | Refills: 0 | DISCHARGE
Start: 2024-04-22

## 2024-04-22 RX ORDER — NYSTATIN 500MM UNIT
5 POWDER (EA) MISCELLANEOUS
Qty: 0 | Refills: 0 | DISCHARGE
Start: 2024-04-22

## 2024-04-22 RX ORDER — DULAGLUTIDE 1.5 MG/.5ML
1.5 INJECTION, SOLUTION SUBCUTANEOUS
Qty: 12 | Refills: 1 | Status: DISCONTINUED | COMMUNITY
Start: 2024-04-18 | End: 2024-04-22

## 2024-04-22 RX ORDER — CARVEDILOL PHOSPHATE 80 MG/1
1 CAPSULE, EXTENDED RELEASE ORAL
Qty: 60 | Refills: 0
Start: 2024-04-22 | End: 2024-05-21

## 2024-04-22 RX ORDER — NIFEDIPINE 30 MG
1 TABLET, EXTENDED RELEASE 24 HR ORAL
Qty: 0 | Refills: 0 | DISCHARGE
Start: 2024-04-22

## 2024-04-22 RX ORDER — SENNA PLUS 8.6 MG/1
2 TABLET ORAL
Qty: 0 | Refills: 0 | DISCHARGE
Start: 2024-04-22

## 2024-04-22 RX ORDER — INSULIN ASPART 100 [IU]/ML
10 INJECTION, SOLUTION SUBCUTANEOUS
Qty: 0 | Refills: 0 | DISCHARGE

## 2024-04-22 RX ORDER — TAMSULOSIN HYDROCHLORIDE 0.4 MG/1
1 CAPSULE ORAL
Qty: 30 | Refills: 0
Start: 2024-04-22 | End: 2024-05-21

## 2024-04-22 RX ORDER — FAMOTIDINE 10 MG/ML
1 INJECTION INTRAVENOUS
Qty: 0 | Refills: 0 | DISCHARGE
Start: 2024-04-22

## 2024-04-22 RX ORDER — FERRIC CITRATE 210 MG/1
3 TABLET, COATED ORAL
Qty: 0 | Refills: 0 | DISCHARGE

## 2024-04-22 RX ORDER — ALLOPURINOL 300 MG
1 TABLET ORAL
Qty: 0 | Refills: 0 | DISCHARGE

## 2024-04-22 RX ORDER — CARVEDILOL PHOSPHATE 80 MG/1
1 CAPSULE, EXTENDED RELEASE ORAL
Qty: 0 | Refills: 0 | DISCHARGE
Start: 2024-04-22

## 2024-04-22 RX ORDER — ASPIRIN 81 MG/1
81 TABLET, DELAYED RELEASE ORAL DAILY
Qty: 30 | Refills: 11 | Status: ACTIVE | COMMUNITY
Start: 2024-04-22 | End: 1900-01-01

## 2024-04-22 RX ORDER — ATORVASTATIN CALCIUM 80 MG/1
1 TABLET, FILM COATED ORAL
Qty: 0 | Refills: 0 | DISCHARGE

## 2024-04-22 RX ORDER — CARVEDILOL PHOSPHATE 80 MG/1
1 CAPSULE, EXTENDED RELEASE ORAL
Qty: 0 | Refills: 0 | DISCHARGE

## 2024-04-22 RX ORDER — LISINOPRIL 2.5 MG/1
80 TABLET ORAL
Qty: 0 | Refills: 0 | DISCHARGE

## 2024-04-22 RX ORDER — BELATACEPT 250 MG/1
750 INJECTION, POWDER, LYOPHILIZED, FOR SOLUTION INTRAVENOUS ONCE
Refills: 0 | Status: COMPLETED | OUTPATIENT
Start: 2024-04-22 | End: 2024-04-22

## 2024-04-22 RX ORDER — MYCOPHENOLATE MOFETIL 250 MG/1
1000 CAPSULE ORAL
Qty: 0 | Refills: 0 | DISCHARGE
Start: 2024-04-22

## 2024-04-22 RX ORDER — BELATACEPT 250 MG/1
750 INJECTION, POWDER, LYOPHILIZED, FOR SOLUTION INTRAVENOUS
Qty: 0 | Refills: 0 | DISCHARGE
Start: 2024-04-22

## 2024-04-22 RX ORDER — ESCITALOPRAM OXALATE 10 MG/1
1 TABLET, FILM COATED ORAL
Qty: 0 | Refills: 0 | DISCHARGE
Start: 2024-04-22

## 2024-04-22 RX ORDER — INSULIN DEGLUDEC 100 U/ML
20 INJECTION, SOLUTION SUBCUTANEOUS
Qty: 0 | Refills: 0 | DISCHARGE

## 2024-04-22 RX ORDER — CHOLECALCIFEROL (VITAMIN D3) 125 MCG
0 CAPSULE ORAL
Qty: 0 | Refills: 0 | DISCHARGE

## 2024-04-22 RX ADMIN — BELATACEPT 200 MILLIGRAM(S): 250 INJECTION, POWDER, LYOPHILIZED, FOR SOLUTION INTRAVENOUS at 12:06

## 2024-04-22 RX ADMIN — FAMOTIDINE 20 MILLIGRAM(S): 10 INJECTION INTRAVENOUS at 12:07

## 2024-04-22 RX ADMIN — VALGANCICLOVIR 450 MILLIGRAM(S): 450 TABLET, FILM COATED ORAL at 12:08

## 2024-04-22 RX ADMIN — CARVEDILOL PHOSPHATE 12.5 MILLIGRAM(S): 80 CAPSULE, EXTENDED RELEASE ORAL at 05:05

## 2024-04-22 RX ADMIN — Medication 500000 UNIT(S): at 05:05

## 2024-04-22 RX ADMIN — POLYETHYLENE GLYCOL 3350 17 GRAM(S): 17 POWDER, FOR SOLUTION ORAL at 12:07

## 2024-04-22 RX ADMIN — Medication 10 UNIT(S): at 13:40

## 2024-04-22 RX ADMIN — HEPARIN SODIUM 5000 UNIT(S): 5000 INJECTION INTRAVENOUS; SUBCUTANEOUS at 05:05

## 2024-04-22 RX ADMIN — Medication 10 UNIT(S): at 08:29

## 2024-04-22 RX ADMIN — Medication 100 MILLIGRAM(S): at 12:07

## 2024-04-22 RX ADMIN — Medication 1 TABLET(S): at 12:07

## 2024-04-22 RX ADMIN — Medication 30 MILLIGRAM(S): at 05:05

## 2024-04-22 RX ADMIN — MYCOPHENOLATE MOFETIL 1 GRAM(S): 250 CAPSULE ORAL at 08:23

## 2024-04-22 RX ADMIN — Medication 20 MILLIGRAM(S): at 05:05

## 2024-04-22 RX ADMIN — Medication 500000 UNIT(S): at 00:48

## 2024-04-22 RX ADMIN — Medication 500000 UNIT(S): at 12:07

## 2024-04-22 RX ADMIN — ESCITALOPRAM OXALATE 10 MILLIGRAM(S): 10 TABLET, FILM COATED ORAL at 12:08

## 2024-04-22 RX ADMIN — Medication 4: at 13:40

## 2024-04-22 NOTE — PROGRESS NOTE ADULT - REASON FOR ADMISSION
Kidney Transplant Candidate
Kidney Transplant Candidate.
Kidney Transplant Candidate

## 2024-04-22 NOTE — PROGRESS NOTE ADULT - SUBJECTIVE AND OBJECTIVE BOX
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 PMH is significant for CKD (2013), ESRD (2/2018), DM (age 57); HTN (age 57).     NKDA    CMV+/+  ENBV+/+    Transplant Medications  Induction  -Thymoglobulin 1.5 mg/kg/dose x 3 doses (cumulative dose of 4.5 mg/kg)        Premedicate one hour prior to administration with                -Acetaminophen 650 mg PO/SD                -Diphenhydramine 50 mg IV/PO                -Steroid taper        Adjust dose for WBC < 3 or PLT < 75  -Methyprednisolone taper (switch to PO prednisone on POD 4)            POD 0: 500 mg IV in OR            POD 1: 125 mg IV Q12H            POD 2: 60 mg IV Q12H            POD 3: 30 mg IV Q12H        Maintenance Immunosuppression  -Tacrolimus (Envarsus) 0.14 mg/kg daily (Adjust for goal trough: 8-10)  -Mycophenolate 1,000 mg PO Q12H  -Prednisone             POD 4: 20 mg PO Q12H            POD 5: 10 mg PO Q12H            POD 6: 5 mg PO Q12H            POD 7-: 5 mg daily     Anti-infection   -Bactrim SS tablet (frequency based on renal function)  -Valganciclovir (dose based on CMV serostatus and frequency based on renal function)  -Nystatin swish and swallow 5 mL four times daily    Surgical prophylaxis pre- and intra-operative dosing  -Cefazolin    Prophylaxis  -GI ppx: famotidine 20 mg daily  -Bowel ppx: senna  -DVT: sequential compression device  -Pain:            Mild: Acetaminophen 650 mg every 6 hours PRN           Moderate: Tramadol 25 mg every 4 hours PRN (adjust for renal function)           Severe: Tramadol 50 mg every 4 hours PRN (adjust for renal function)    Home Medications:  allopurinol 100 mg oral tablet: 1 tab(s) orally 2 times a day (12 Nov 2020 07:05)  aspirin 81 mg oral tablet: 1 tab(s) orally once a day (12 Nov 2020 07:05)  atorvastatin 10 mg oral tablet: 1 tab(s) orally once a day (12 Nov 2020 07:37)  Auryxia 210 mg oral tablet: 3 tab(s) orally 3 times a day (12 Nov 2020 07:37)  carvedilol 12.5 mg oral tablet: 1 tab(s) orally 2 times a day (12 Nov 2020 07:37)  furosemide 40 mg oral tablet: 1 tab(s) orally once a day (12 Nov 2020 07:05)  lisinopril: 80 milligram(s) orally once a day (12 Nov 2020 07:37)  tamsulosin 0.4 mg oral capsule: 1 cap(s) orally once a day (12 Nov 2020 07:37)  Tradjenta 5 mg oral tablet: 1 tab(s) orally once a day (12 Nov 2020 07:37)  Tresiba FlexTouch 100 units/mL subcutaneous solution: 22 microcurie subcutaneous once a day (at bedtime) (12 Nov 2020 07:37)  Vitamin D3 50,000 intl units (1250 mcg) oral capsule: orally once a month (12 Nov 2020 07:37)      Outpatient medication reconciliation reviewed and will be re-started appropriately.  Plan discussed with multidisciplinary team.   
Transplant Surgery - Multidisciplinary Progress Note  --------------------------------------------------------------  R DDRT 4/17/24  POD#4    Present: Patient seen and examined with multidisciplinary team Surgeon Dr. Molina, Nephrologist Dr. Garcia, [facility]. Disciplines not in attendance will be notified of the plan.    HPI: 78 YO M with known CKD (2013), ESRD (2/2018) follows at [facility] dialysis unit [facility] dialysis unit. , DM (age 57) not on insulin; HTN (age 57). No known h/o kidney stone/ Prostatism. No hematuria/Nocturia/Transfusions. Urine out put: 1-2 cups/day. Has no h/o UTI. Pneumonia 25 years ago, was not hospitalized. No illness or hospitalization in the last 6 months. No history of kidney/ bladder/ prostate surgery. Non smoker. Quit 40 years ago, 1/2 ppd for 5 years. Left forearm AVG, Dr. Retana and Dr. Montes De Oca. Presents in usual state of health. Now S/P DDRT  POD#0.     Interval Events:  - Afebrile, hypertensive, started coreg 6.25 BID  - completed thymo, solu 60  - resumed SQH  - ON HTN again, hydral 10 IV x1, started nifed 30mg qd      Immunosuppression:   Induction:  Thymoglobulin completed                                            Nicole, MMF 1/1, SST  Ongoing monitoring for signs of rejection.   Potential Discharge date: Pending clinical course   Education:  Medications  Plan of care:  See Below      MEDICATIONS  (STANDING):  allopurinol 100 milliGRAM(s) Oral daily  atorvastatin 40 milliGRAM(s) Oral at bedtime  carvedilol 6.25 milliGRAM(s) Oral every 12 hours  chlorhexidine 2% Cloths 1 Application(s) Topical daily  dextrose 10% Bolus 125 milliLiter(s) IV Bolus once  dextrose 5%. 1000 milliLiter(s) (50 mL/Hr) IV Continuous <Continuous>  dextrose 5%. 1000 milliLiter(s) (100 mL/Hr) IV Continuous <Continuous>  dextrose 50% Injectable 12.5 Gram(s) IV Push once  dextrose 50% Injectable 25 Gram(s) IV Push once  escitalopram 10 milliGRAM(s) Oral daily  famotidine    Tablet 20 milliGRAM(s) Oral daily  glucagon  Injectable 1 milliGRAM(s) IntraMuscular once  heparin   Injectable 5000 Unit(s) SubCutaneous every 12 hours  influenza  Vaccine (HIGH DOSE) 0.7 milliLiter(s) IntraMuscular once  insulin glargine Injectable (LANTUS) 20 Unit(s) SubCutaneous at bedtime  insulin lispro (ADMELOG) corrective regimen sliding scale   SubCutaneous at bedtime  insulin lispro (ADMELOG) corrective regimen sliding scale   SubCutaneous three times a day before meals  insulin lispro Injectable (ADMELOG) 10 Unit(s) SubCutaneous three times a day before meals  mycophenolate mofetil 1 Gram(s) Oral every 12 hours  NIFEdipine XL 30 milliGRAM(s) Oral daily  nystatin    Suspension 272564 Unit(s) Swish and Swallow four times a day  polyethylene glycol 3350 17 Gram(s) Oral daily  predniSONE   Tablet   Oral   predniSONE   Tablet 20 milliGRAM(s) Oral once  senna 2 Tablet(s) Oral at bedtime  trimethoprim   80 mG/sulfamethoxazole 400 mG 1 Tablet(s) Oral daily  valGANciclovir 450 milliGRAM(s) Oral daily    MEDICATIONS  (PRN):  dextrose Oral Gel 15 Gram(s) Oral once PRN Blood Glucose LESS THAN 70 milliGRAM(s)/deciliter  diphenhydrAMINE Injectable 25 milliGRAM(s) IV Push every 8 hours PRN Pruritus  nalbuphine Injectable 2.5 milliGRAM(s) IV Push every 6 hours PRN Pruritus  naloxone Injectable 0.1 milliGRAM(s) IV Push every 3 minutes PRN For ANY of the following changes in patient status:  A. RR LESS THAN 10 breaths per minute, B. Oxygen saturation LESS THAN 90%, C. Sedation score of 6  ondansetron Injectable 4 milliGRAM(s) IV Push every 6 hours PRN Nausea  ondansetron Injectable 4 milliGRAM(s) IV Push every 6 hours PRN Nausea and/or Vomiting  traMADol 50 milliGRAM(s) Oral every 8 hours PRN Severe Pain (7 - 10)  traMADol 25 milliGRAM(s) Oral every 6 hours PRN Moderate Pain (4 - 6)      PAST MEDICAL & SURGICAL HISTORY:  Diabetes mellitus, type 2      Essential (primary) hypertension      GERD without esophagitis      ESRD (end stage renal disease)      Stage 4 chronic kidney disease      Head injury with loss of consciousness  2014      Chronic gout of multiple sites, unspecified cause      Hyperlipidemia, unspecified hyperlipidemia type      Enlarged prostate      History of appendectomy          Vital Signs Last 24 Hrs  T(C): 36.8 (21 Apr 2024 05:00), Max: 37.1 (20 Apr 2024 21:20)  T(F): 98.2 (21 Apr 2024 05:00), Max: 98.8 (20 Apr 2024 21:20)  HR: 71 (21 Apr 2024 05:00) (69 - 81)  BP: 156/70 (21 Apr 2024 05:00) (149/67 - 178/80)  BP(mean): 97 (21 Apr 2024 02:14) (97 - 97)  RR: 17 (21 Apr 2024 05:00) (17 - 18)  SpO2: 94% (21 Apr 2024 05:00) (94% - 100%)    Parameters below as of 21 Apr 2024 05:00  Patient On (Oxygen Delivery Method): room air        I&O's Summary    20 Apr 2024 07:01  -  21 Apr 2024 07:00  --------------------------------------------------------  IN: 1560 mL / OUT: 2085 mL / NET: -525 mL                              9.5    6.41  )-----------( 89       ( 20 Apr 2024 06:39 )             29.5     04-20    138  |  104  |  71<H>  ----------------------------<  241<H>  4.5   |  22  |  3.10<H>    Ca    8.9      20 Apr 2024 06:39  Phos  3.5     04-20  Mg     2.2     04-20    TPro  5.2<L>  /  Alb  3.1<L>  /  TBili  0.3  /  DBili  x   /  AST  31  /  ALT  26  /  AlkPhos  73  04-20        Review of systems  Gen: No weight changes, fatigue, fevers/chills, weakness  Skin: No rashes  Head/Eyes/Ears/Mouth: No headache; Normal hearing; Normal vision w/o blurriness; No sinus pain/discomfort, sore throat  Respiratory: No dyspnea, cough, wheezing, hemoptysis  CV: No chest pain, PND, orthopnea  GI: C/O mild abdominal pain at surgical site, No diarrhea, constipation, nausea, vomiting, melena, hematochezia  : No increased frequency, dysuria, hematuria, nocturia  MSK: No joint pain/swelling; no back pain; no edema  Neuro: No dizziness/lightheadedness, weakness, seizures, numbness, tingling  Heme: No easy bruising or bleeding  Endo: No heat/cold intolerance  Psych: No significant nervousness, anxiety, stress, depression  All other systems were reviewed and are negative, except as noted.      PHYSICAL EXAM:  Constitutional: Well developed / well nourished  Eyes: Anicteric, PERRLA  ENMT: nc/at  Neck: Supple, trachea midline, line c/d/i  Respiratory: CTA B/L  Cardiovascular: RRR  Gastrointestinal: Soft, non distended, appropriate incisional TTP. dressing c/d/i. JPx1 ss  Genitourinary: rubin in place  Extremities: SCD's in place and working bilaterally  Vascular: Palpable dp pulses bilaterally  Neurological: A&O x3  Skin: no rashes, ulcerations or lesions;  Musculoskeletal: Moving all extremities  Psychiatric: Responsive 
Transplant Surgery - POC  --------------------------------------------------------------  R DDRT 4/17/24  POD#0    HPI: 80 YO M with known CKD (2013), ESRD (2/2018) [facility] dialysis unit. , DM (age 57) not on insulin; HTN (age 57). No known h/o kidney stone/ Prostatism. No hematuria/Nocturia/Transfusions. Urine out put: 1-2 cups/day. Has no h/o UTI. Pneumonia 25 years ago, was not hospitalized. No illness or hospitalization in the last 6 months. No history of kidney/ bladder/ prostate surgery. Non smoker. Quit 40 years ago, 1/2 ppd for 5 years. Left forearm AVG, Dr. Retana and Dr. Montes De Oca. Presents in usual state of health. Now S/P DDRT  POD#0.     Interval Events:  - Afebrile. S/P 500cc x1, 1U PRBC in PACU.   - Graft: 20-35cc/hr, doppler with good flow  - drowsy, but AAOx3  - appropriate incisional pain  - GLADIS OP: 430cc sang.     Immunosuppression:   Induction:  Thymoglobulin                                            ENV per level, MMF 1/1, SST  Ongoing monitoring for signs of rejection.     Potential Discharge date: Pending clinical course   Education:  Medications  Plan of care:  See Below    MEDICATIONS  (STANDING):  chlorhexidine 2% Cloths 1 Application(s) Topical daily  dextrose 10% Bolus 125 milliLiter(s) IV Bolus once  dextrose 5%. 1000 milliLiter(s) (50 mL/Hr) IV Continuous <Continuous>  dextrose 5%. 1000 milliLiter(s) (100 mL/Hr) IV Continuous <Continuous>  dextrose 50% Injectable 12.5 Gram(s) IV Push once  dextrose 50% Injectable 25 Gram(s) IV Push once  glucagon  Injectable 1 milliGRAM(s) IntraMuscular once  influenza  Vaccine (HIGH DOSE) 0.7 milliLiter(s) IntraMuscular once  insulin lispro (ADMELOG) corrective regimen sliding scale   SubCutaneous three times a day before meals  insulin lispro (ADMELOG) corrective regimen sliding scale   SubCutaneous at bedtime  multiple electrolytes Injection Type 1 1000 milliLiter(s) (70 mL/Hr) IV Continuous <Continuous>  multiple electrolytes Injection Type 1 1000 milliLiter(s) (50 mL/Hr) IV Continuous <Continuous>  polyethylene glycol 3350 17 Gram(s) Oral daily  senna 2 Tablet(s) Oral at bedtime    MEDICATIONS  (PRN):  dextrose Oral Gel 15 Gram(s) Oral once PRN Blood Glucose LESS THAN 70 milliGRAM(s)/deciliter  diphenhydrAMINE Injectable 25 milliGRAM(s) IV Push every 8 hours PRN Pruritus  fentaNYL    Injectable 50 MICROGram(s) IV Push every 15 minutes PRN Severe Pain (7 - 10)  HYDROmorphone  Injectable 0.5 milliGRAM(s) IV Push every 10 minutes PRN Moderate Pain (4 - 6)  nalbuphine Injectable 2.5 milliGRAM(s) IV Push every 6 hours PRN Pruritus  naloxone Injectable 0.1 milliGRAM(s) IV Push every 3 minutes PRN For ANY of the following changes in patient status:  A. RR LESS THAN 10 breaths per minute, B. Oxygen saturation LESS THAN 90%, C. Sedation score of 6  ondansetron Injectable 4 milliGRAM(s) IV Push every 6 hours PRN Nausea  ondansetron Injectable 4 milliGRAM(s) IV Push once PRN Nausea and/or Vomiting  ondansetron Injectable 4 milliGRAM(s) IV Push every 6 hours PRN Nausea and/or Vomiting  traMADol 25 milliGRAM(s) Oral every 6 hours PRN Moderate Pain (4 - 6)  traMADol 50 milliGRAM(s) Oral every 8 hours PRN Severe Pain (7 - 10)    PAST MEDICAL & SURGICAL HISTORY:  Diabetes mellitus, type 2  Essential (primary) hypertension  GERD without esophagitis  ESRD (end stage renal disease)      Stage 4 chronic kidney disease      Head injury with loss of consciousness  2014      Chronic gout of multiple sites, unspecified cause      Hyperlipidemia, unspecified hyperlipidemia type      Enlarged prostate    History of appendectomy    Vital Signs Last 24 Hrs  T(C): 36.3 (17 Apr 2024 22:00), Max: 37.6 (17 Apr 2024 04:30)  T(F): 97.3 (17 Apr 2024 22:00), Max: 99.6 (17 Apr 2024 04:30)  HR: 69 (17 Apr 2024 22:00) (66 - 89)  BP: 129/58 (17 Apr 2024 22:00) (94/52 - 183/78)  BP(mean): 84 (17 Apr 2024 22:00) (70 - 91)  RR: 16 (17 Apr 2024 22:00) (16 - 18)  SpO2: 98% (17 Apr 2024 22:00) (97% - 100%)    Parameters below as of 17 Apr 2024 21:00  Patient On (Oxygen Delivery Method): room air    I&O's Summary    17 Apr 2024 07:01  -  17 Apr 2024 22:20  --------------------------------------------------------  IN: 1150 mL / OUT: 765 mL / NET: 385 mL                         10.3   8.30  )-----------( 97       ( 17 Apr 2024 18:34 )             31.4     04-17    139  |  101  |  73<H>  ----------------------------<  218<H>  3.9   |  21<L>  |  8.41<H>    Ca    8.4      17 Apr 2024 18:34  Phos  4.5     04-17  Mg     2.1     04-17    TPro  5.4<L>  /  Alb  3.0<L>  /  TBili  0.3  /  DBili  x   /  AST  35  /  ALT  28  /  AlkPhos  93  04-17    Review of systems  All other systems were reviewed and are negative, except as noted.      PHYSICAL EXAM:  Constitutional: Well developed / well nourished  Eyes: Anicteric, PERRLA  ENMT: nc/at  Neck: Supple, trachea midline, line c/d/i  Respiratory: CTA B/L  Cardiovascular: RRR  Gastrointestinal: Soft, non distended, appropriate incisional TTP. dressing c/d/i. JPx1 ss  Genitourinary: anuric via rubin  Extremities: SCD's in place and working bilaterally, AVF non functional w/o thrill, right perm cath dressing cdi  Vascular: Palpable dp pulses bilaterally  Neurological: A&O x3  Skin: no rashes, ulcerations or lesions;  Musculoskeletal: Moving all extremities  Psychiatric: Responsive 
[facility] DIVISION OF KIDNEY DISEASES AND HYPERTENSION   FOLLOW UP NOTE    --------------------------------------------------------------------------------    SUBJECTIVE / ROS / INTERVAL EVENTS:  - Patient seen and examined at bedside  - No acute complaints or overnight events  - UO 1.8L      PAST HISTORY  --------------------------------------------------------------------------------  No significant changes to PMH, PSH, FHx, SHx, unless otherwise noted    ALLERGIES & MEDICATIONS  --------------------------------------------------------------------------------  Allergies    No Known Allergies    Standing Inpatient Medications  allopurinol 100 milliGRAM(s) Oral daily  atorvastatin 40 milliGRAM(s) Oral at bedtime  chlorhexidine 2% Cloths 1 Application(s) Topical daily  dextrose 10% Bolus 125 milliLiter(s) IV Bolus once  dextrose 5%. 1000 milliLiter(s) IV Continuous <Continuous>  dextrose 5%. 1000 milliLiter(s) IV Continuous <Continuous>  dextrose 50% Injectable 12.5 Gram(s) IV Push once  dextrose 50% Injectable 25 Gram(s) IV Push once  escitalopram 10 milliGRAM(s) Oral daily  famotidine    Tablet 20 milliGRAM(s) Oral daily  glucagon  Injectable 1 milliGRAM(s) IntraMuscular once  influenza  Vaccine (HIGH DOSE) 0.7 milliLiter(s) IntraMuscular once  insulin glargine Injectable (LANTUS) 18 Unit(s) SubCutaneous at bedtime  insulin lispro (ADMELOG) corrective regimen sliding scale   SubCutaneous at bedtime  insulin lispro (ADMELOG) corrective regimen sliding scale   SubCutaneous three times a day before meals  insulin lispro Injectable (ADMELOG) 5 Unit(s) SubCutaneous three times a day before meals  mycophenolate mofetil 1 Gram(s) Oral every 12 hours  nystatin    Suspension [id number] Unit(s) Swish and Swallow four times a day  polyethylene glycol 3350 17 Gram(s) Oral daily  senna 2 Tablet(s) Oral at bedtime  trimethoprim   80 mG/sulfamethoxazole 400 mG 1 Tablet(s) Oral daily  valGANciclovir 450 milliGRAM(s) Oral <User Schedule>    PRN Inpatient Medications  dextrose Oral Gel 15 Gram(s) Oral once PRN  diphenhydrAMINE Injectable 25 milliGRAM(s) IV Push every 8 hours PRN  nalbuphine Injectable 2.5 milliGRAM(s) IV Push every 6 hours PRN  naloxone Injectable 0.1 milliGRAM(s) IV Push every 3 minutes PRN  ondansetron Injectable 4 milliGRAM(s) IV Push every 6 hours PRN  ondansetron Injectable 4 milliGRAM(s) IV Push every 6 hours PRN  traMADol 25 milliGRAM(s) Oral every 6 hours PRN  traMADol 50 milliGRAM(s) Oral every 8 hours PRN      VITALS  --------------------------------------------------------------------------------  T(C): 36.9 (04-19-24 @ 13:00), Max: 36.9 (04-19-24 @ 13:00)  HR: 75 (04-19-24 @ 13:00) (70 - 82)  BP: 169/79 (04-19-24 @ 13:00) (129/60 - 176/78)  RR: 18 (04-19-24 @ 13:00) (18 - 18)  SpO2: 99% (04-19-24 @ 13:00) (95% - 99%)  Wt(kg): --      04-18-24 @ 07:01  -  04-19-24 @ 07:00  --------------------------------------------------------  IN: 3159 mL / OUT: 2050 mL / NET: 1109 mL    04-19-24 @ 07:01  -  04-19-24 @ 15:53  --------------------------------------------------------  IN: 840 mL / OUT: 440 mL / NET: 400 mL      PHYSICAL EXAM:  General: no acute distress  Neuro: no focal deficits  HEENT: NC/AT, anicteric  Pulmonary: lungs CTA B/L  Cardiovascular/Chest: +S1S2, RRR  GI/Abdomen: soft, NT/ND, +bowel sounds         Transplant site: minimal TTP, incision c/d/i  Extremities: No edema  Skin: Warm and dry  Vascular access: LUE AVF    LABS/STUDIES  --------------------------------------------------------------------------------              11.1   9.80  >-----------<  110      [04-19-24 @ 12:46]              35.0     139  |  103  |  72  ----------------------------<  223      [04-19-24 @ 06:34]  4.4   |  21  |  4.55        Ca     9.2     [04-19-24 @ 06:34]      Mg     2.4     [04-19-24 @ 06:34]      Phos  4.8     [04-19-24 @ 06:34]    TPro  5.5  /  Alb  3.0  /  TBili  0.2  /  DBili  x   /  AST  26  /  ALT  17  /  AlkPhos  73  [04-19-24 @ 06:34]    PT/INR: PT 11.8 , INR 1.07       [04-17-24 @ 19:24]  PTT: 27.3       [04-17-24 @ 19:24]      Creatinine Trend:  SCr 4.55 [04-19 @ 06:34]  SCr 7.41 [04-18 @ 06:42]  SCr 7.99 [04-17 @ 23:57]  SCr 8.41 [04-17 @ 18:34]  SCr 8.41 [04-17 @ 05:27]    Urinalysis - [04-19-24 @ 06:34]      Color  / Appearance  / SG  / pH       Gluc 223 / Ketone   / Bili  / Urobili        Blood  / Protein  / Leuk Est  / Nitrite       RBC  / WBC  / Hyaline  / Gran  / Sq Epi  / Non Sq Epi  / Bacteria       HBsAb 38.0      [04-17-24 @ 05:27]  HBsAg Nonreact      [04-17-24 @ 05:27]  HBcAb Nonreact      [04-17-24 @ 05:27]  HCV 0.07, Nonreact      [04-17-24 @ 05:27]  HIV Nonreact      [04-17-24 @ 05:27]
[facility] DIVISION OF KIDNEY DISEASES AND HYPERTENSION   FOLLOW UP NOTE    --------------------------------------------------------------------------------    SUBJECTIVE / ROS / INTERVAL EVENTS:  - Patient seen and examined at bedside  - s/p DDRT on 4/17 with good graft function  - no acute complaints      PAST HISTORY  --------------------------------------------------------------------------------  No significant changes to PMH, PSH, FHx, SHx, unless otherwise noted    ALLERGIES & MEDICATIONS  --------------------------------------------------------------------------------  Allergies    No Known Allergies      Standing Inpatient Medications  acetaminophen     Tablet .. 650 milliGRAM(s) Oral once  antithymocyte globulin rabbit (peripheral) IVPB w/additives 125 milliGRAM(s) IV Intermittent once  belatacept IVPB 750 milliGRAM(s) IV Intermittent once  chlorhexidine 2% Cloths 1 Application(s) Topical daily  dextrose 10% Bolus 125 milliLiter(s) IV Bolus once  dextrose 5%. 1000 milliLiter(s) IV Continuous <Continuous>  dextrose 5%. 1000 milliLiter(s) IV Continuous <Continuous>  dextrose 50% Injectable 25 Gram(s) IV Push once  dextrose 50% Injectable 12.5 Gram(s) IV Push once  diphenhydrAMINE 25 milliGRAM(s) Oral once  famotidine    Tablet 20 milliGRAM(s) Oral daily  glucagon  Injectable 1 milliGRAM(s) IntraMuscular once  heparin   Injectable 5000 Unit(s) SubCutaneous every 12 hours  influenza  Vaccine (HIGH DOSE) 0.7 milliLiter(s) IntraMuscular once  insulin lispro (ADMELOG) corrective regimen sliding scale   SubCutaneous three times a day before meals  insulin lispro (ADMELOG) corrective regimen sliding scale   SubCutaneous at bedtime  methylPREDNISolone sodium succinate IVPB 250 milliGRAM(s) IV Intermittent once  multiple electrolytes Injection Type 1 1000 milliLiter(s) IV Continuous <Continuous>  multiple electrolytes Injection Type 1 1000 milliLiter(s) IV Continuous <Continuous>  mycophenolate mofetil 1 Gram(s) Oral every 12 hours  nystatin    Suspension [id number] Unit(s) Swish and Swallow four times a day  polyethylene glycol 3350 17 Gram(s) Oral daily  senna 2 Tablet(s) Oral at bedtime  trimethoprim   80 mG/sulfamethoxazole 400 mG 1 Tablet(s) Oral daily  valGANciclovir 450 milliGRAM(s) Oral <User Schedule>    PRN Inpatient Medications  dextrose Oral Gel 15 Gram(s) Oral once PRN  diphenhydrAMINE Injectable 25 milliGRAM(s) IV Push every 8 hours PRN  nalbuphine Injectable 2.5 milliGRAM(s) IV Push every 6 hours PRN  naloxone Injectable 0.1 milliGRAM(s) IV Push every 3 minutes PRN  ondansetron Injectable 4 milliGRAM(s) IV Push every 6 hours PRN  ondansetron Injectable 4 milliGRAM(s) IV Push every 6 hours PRN  traMADol 50 milliGRAM(s) Oral every 8 hours PRN  traMADol 25 milliGRAM(s) Oral every 6 hours PRN      VITALS  --------------------------------------------------------------------------------  T(C): 36.7 (04-18-24 @ 10:04), Max: 37 (04-17-24 @ 11:32)  HR: 93 (04-18-24 @ 10:21) (66 - 93)  BP: 138/72 (04-18-24 @ 10:21) (94/52 - 183/78)  RR: 18 (04-18-24 @ 10:04) (16 - 18)  SpO2: 96% (04-18-24 @ 10:21) (93% - 100%)  Wt(kg): --  Height (cm): 165.1 (04-17-24 @ 12:44)  Weight (kg): 74.6 (04-17-24 @ 12:44)  BMI (kg/m2): 27.4 (04-17-24 @ 12:44)  BSA (m2): 1.82 (04-17-24 @ 12:44)    04-17-24 @ 07:01  -  04-18-24 @ 07:00  --------------------------------------------------------  IN: 1780 mL / OUT: 1330 mL / NET: 450 mL    04-18-24 @ 07:01  -  04-18-24 @ 10:38  --------------------------------------------------------  IN: 840 mL / OUT: 260 mL / NET: 580 mL      PHYSICAL EXAM:  General: no acute distress  Neuro: no focal deficits  HEENT: NC/AT, anicteric  Pulmonary: lungs CTA B/L  Cardiovascular/Chest: +S1S2, RRR  GI/Abdomen: soft, NT/ND, +bowel sounds         Transplant site: minimal TTP, incision c/d/i  Extremities: No edema  Skin: Warm and dry  Vascular access: JOJO SOLORZANO    LABS/STUDIES  --------------------------------------------------------------------------------              12.4   14.56 >-----------<  130      [04-18-24 @ 06:42]              37.9     140  |  101  |  75  ----------------------------<  183      [04-18-24 @ 06:42]  4.7   |  19  |  7.41        Ca     9.1     [04-18-24 @ 06:42]      Mg     2.3     [04-18-24 @ 06:42]      Phos  6.0     [04-18-24 @ 06:42]    TPro  5.8  /  Alb  3.1  /  TBili  0.3  /  DBili  x   /  AST  36  /  ALT  26  /  AlkPhos  87  [04-18-24 @ 06:42]    PT/INR: PT 11.8 , INR 1.07       [04-17-24 @ 19:24]  PTT: 27.3       [04-17-24 @ 19:24]      Creatinine Trend:  SCr 7.41 [04-18 @ 06:42]  SCr 7.99 [04-17 @ 23:57]  SCr 8.41 [04-17 @ 18:34]  SCr 8.41 [04-17 @ 05:27]    Urinalysis - [04-18-24 @ 06:42]      Color  / Appearance  / SG  / pH       Gluc 183 / Ketone   / Bili  / Urobili        Blood  / Protein  / Leuk Est  / Nitrite       RBC  / WBC  / Hyaline  / Gran  / Sq Epi  / Non Sq Epi  / Bacteria       HBsAb 38.0      [04-17-24 @ 05:27]  HBsAg Nonreact      [04-17-24 @ 05:27]  HBcAb Nonreact      [04-17-24 @ 05:27]  HCV 0.07, Nonreact      [04-17-24 @ 05:27]  HIV Nonreact      [04-17-24 @ 05:27]
[facility] DIVISION OF KIDNEY DISEASES AND HYPERTENSION -- FOLLOW UP NOTE  --------------------------------------------------------------------------------  Authored by: Nathan Garcia   Cell # [phone]     GISELA MORALES was seen and examined at bedside.   Patient is a 79y old  Male who presents with a chief complaint of Kidney Transplant Candidate (04-22-24)      24 hour events/subjective: No major events. Hematuria clearing. -170, Coreg dose increased to 12..5mg po bid. Afebrile. Ambulating, tolerating diet. No NVD.       Standing Inpatient Medications  allopurinol 100 milliGRAM(s) Oral daily  atorvastatin 40 milliGRAM(s) Oral at bedtime  belatacept IVPB 750 milliGRAM(s) IV Intermittent once  carvedilol 12.5 milliGRAM(s) Oral every 12 hours  escitalopram 10 milliGRAM(s) Oral daily  famotidine    Tablet 20 milliGRAM(s) Oral daily  glucagon  Injectable 1 milliGRAM(s) IntraMuscular once  heparin   Injectable 5000 Unit(s) SubCutaneous every 12 hours  influenza  Vaccine (HIGH DOSE) 0.7 milliLiter(s) IntraMuscular once  insulin glargine Injectable (LANTUS) 20 Unit(s) SubCutaneous at bedtime  insulin lispro (ADMELOG) corrective regimen sliding scale   SubCutaneous three times a day before meals  insulin lispro (ADMELOG) corrective regimen sliding scale   SubCutaneous at bedtime  insulin lispro Injectable (ADMELOG) 10 Unit(s) SubCutaneous three times a day before meals  mycophenolate mofetil 1 Gram(s) Oral every 12 hours  NIFEdipine XL 30 milliGRAM(s) Oral daily  nystatin    Suspension 895482 Unit(s) Swish and Swallow four times a day  polyethylene glycol 3350 17 Gram(s) Oral daily  predniSONE   Tablet   Oral   predniSONE   Tablet 10 milliGRAM(s) Oral once  senna 2 Tablet(s) Oral at bedtime  trimethoprim   80 mG/sulfamethoxazole 400 mG 1 Tablet(s) Oral daily  valGANciclovir 450 milliGRAM(s) Oral daily    PRN Inpatient Medications  dextrose Oral Gel 15 Gram(s) Oral once PRN  diphenhydrAMINE Injectable 25 milliGRAM(s) IV Push every 8 hours PRN  nalbuphine Injectable 2.5 milliGRAM(s) IV Push every 6 hours PRN  naloxone Injectable 0.1 milliGRAM(s) IV Push every 3 minutes PRN  ondansetron Injectable 4 milliGRAM(s) IV Push every 6 hours PRN  ondansetron Injectable 4 milliGRAM(s) IV Push every 6 hours PRN  traMADol 50 milliGRAM(s) Oral every 8 hours PRN  traMADol 25 milliGRAM(s) Oral every 6 hours PRN        VITALS/PHYSICAL EXAM  --------------------------------------------------------------------------------  T(C): 36.8 (04-22-24 @ 08:30), Max: 36.8 (04-21-24 @ 21:00)  HR: 80 (04-22-24 @ 09:41) (73 - 80)  BP: 167/62 (04-22-24 @ 08:30) (147/71 - 173/73)  RR: 18 (04-22-24 @ 08:30) (17 - 18)  SpO2: 96% (04-22-24 @ 08:30) (95% - 100%)          04-21-24 @ 07:01  -  04-22-24 @ 07:00  --------------------------------------------------------  IN: 1440 mL / OUT: 2245 mL / NET: -805 mL    04-22-24 @ 07:01  -  04-22-24 @ 10:59  --------------------------------------------------------  IN: 240 mL / OUT: 220 mL / NET: 20 mL      Physical Exam:  Awake and alert  Resting comfortably  Lungs clear   Abdomen soft, non distended, RLQ incision clean dry and intact, + dionna. GLADIS serosanguinous ~ 100ml   Calderon in place, light pink urine   No edema   LUE AVF   Alert and oriented     LABS/STUDIES  --------------------------------------------------------------------------------              9.5    5.03  >-----------<  107      [04-22-24 @ 07:24]              29.2     139  |  107  |  58  ----------------------------<  93      [04-22-24 @ 07:24]  4.6   |  20  |  1.84        Ca     8.9     [04-22-24 @ 07:24]      Mg     2.0     [04-22-24 @ 07:24]      Phos  3.0     [04-22-24 @ 07:24]    TPro  5.2  /  Alb  3.2  /  TBili  0.6  /  DBili  x   /  AST  31  /  ALT  36  /  AlkPhos  84  [04-22-24 @ 07:24]      Creatinine Trend:  SCr 1.84 [04-22 @ 07:24]  SCr 2.15 [04-21 @ 08:18]  SCr 3.10 [04-20 @ 06:39]  SCr 4.55 [04-19 @ 06:34]  SCr 7.41 [04-18 @ 06:42]      CAPILLARY BLOOD GLUCOSE  POCT Blood Glucose.: 133 mg/dL (22 Apr 2024 08:24)  POCT Blood Glucose.: 187 mg/dL (21 Apr 2024 21:03)  POCT Blood Glucose.: 235 mg/dL (21 Apr 2024 17:28)  POCT Blood Glucose.: 205 mg/dL (21 Apr 2024 12:38)      HBsAb 38.0      [04-17-24 @ 05:27]  HBsAg Nonreact      [04-17-24 @ 05:27]  HBcAb Nonreact      [04-17-24 @ 05:27]  HCV 0.07, Nonreact      [04-17-24 @ 05:27]  HIV Nonreact      [04-17-24 @ 05:27]      
[facility] DIVISION OF KIDNEY DISEASES AND HYPERTENSION -- FOLLOW UP NOTE  --------------------------------------------------------------------------------  Authored by: Nathan Garcia   Cell # [phone]     GISELA MORALES was seen and examined at bedside.   Patient is a 79y old  Male who presents with a chief complaint of Kidney Transplant Candidate (04-21-24)      24 hour events/subjective: No major events. Feels well. Pain controlled. No NVD. Ambulating with a walker. Good UOP via Calderon still has hematuria.    Started Coreg and Nifedipine for HTN. BP improved this AM. Lantus dose increased to 20, ADMELOG 10 and sliding scale glucose better controlled.     Standing Inpatient Medications  allopurinol 100 milliGRAM(s) Oral daily  atorvastatin 40 milliGRAM(s) Oral at bedtime  carvedilol 6.25 milliGRAM(s) Oral every 12 hours  escitalopram 10 milliGRAM(s) Oral daily  famotidine    Tablet 20 milliGRAM(s) Oral daily  heparin   Injectable 5000 Unit(s) SubCutaneous every 12 hours  influenza  Vaccine (HIGH DOSE) 0.7 milliLiter(s) IntraMuscular once  insulin glargine Injectable (LANTUS) 20 Unit(s) SubCutaneous at bedtime  insulin lispro (ADMELOG) corrective regimen sliding scale   SubCutaneous at bedtime  insulin lispro (ADMELOG) corrective regimen sliding scale   SubCutaneous three times a day before meals  insulin lispro Injectable (ADMELOG) 10 Unit(s) SubCutaneous three times a day before meals  mycophenolate mofetil 1 Gram(s) Oral every 12 hours  NIFEdipine XL 30 milliGRAM(s) Oral daily  nystatin    Suspension 754640 Unit(s) Swish and Swallow four times a day  polyethylene glycol 3350 17 Gram(s) Oral daily  predniSONE   Tablet   Oral   predniSONE   Tablet 20 milliGRAM(s) Oral once  senna 2 Tablet(s) Oral at bedtime  trimethoprim   80 mG/sulfamethoxazole 400 mG 1 Tablet(s) Oral daily  valGANciclovir 450 milliGRAM(s) Oral daily    PRN Inpatient Medications  dextrose Oral Gel 15 Gram(s) Oral once PRN  diphenhydrAMINE Injectable 25 milliGRAM(s) IV Push every 8 hours PRN  nalbuphine Injectable 2.5 milliGRAM(s) IV Push every 6 hours PRN  naloxone Injectable 0.1 milliGRAM(s) IV Push every 3 minutes PRN  ondansetron Injectable 4 milliGRAM(s) IV Push every 6 hours PRN  ondansetron Injectable 4 milliGRAM(s) IV Push every 6 hours PRN  traMADol 25 milliGRAM(s) Oral every 6 hours PRN  traMADol 50 milliGRAM(s) Oral every 8 hours PRN        VITALS/PHYSICAL EXAM  --------------------------------------------------------------------------------  T(C): 36.7 (04-21-24 @ 09:00), Max: 37.1 (04-20-24 @ 21:20)  HR: 86 (04-21-24 @ 09:00) (69 - 86)  BP: 160/80 (04-21-24 @ 09:00) (149/67 - 178/80)  RR: 18 (04-21-24 @ 09:00) (17 - 18)  SpO2: 97% (04-21-24 @ 09:00) (94% - 100%)        04-20-24 @ 07:01  -  04-21-24 @ 07:00  --------------------------------------------------------  IN: 1560 mL / OUT: 2085 mL / NET: -525 mL    04-21-24 @ 07:01  -  04-21-24 @ 11:29  --------------------------------------------------------  IN: 240 mL / OUT: 320 mL / NET: -80 mL      Physical Exam:  Awake and alert  Resting comfortably , respirations non labored   Lungs clear   Abdomen soft, non distended, RLQ incision clean melvi and intact, |+ staples. GLADIS serosanguinous ~ 75ml   Calderon in place, blood tinged urine   No edema   LUE AVF   Alert and oriented     LABS/STUDIES  --------------------------------------------------------------------------------              9.4    4.82  >-----------<  86       [04-21-24 @ 08:21]              29.3     139  |  108  |  61  ----------------------------<  160      [04-21-24 @ 08:18]  4.3   |  21  |  2.15        Ca     8.8     [04-21-24 @ 08:18]      Mg     2.0     [04-21-24 @ 08:18]      Phos  2.9     [04-21-24 @ 08:18]    TPro  5.1  /  Alb  3.0  /  TBili  0.4  /  DBili  x   /  AST  26  /  ALT  28  /  AlkPhos  80  [04-21-24 @ 08:18]      Creatinine Trend:  SCr 2.15 [04-21 @ 08:18]  SCr 3.10 [04-20 @ 06:39]  SCr 4.55 [04-19 @ 06:34]  SCr 7.41 [04-18 @ 06:42]  SCr 7.99 [04-17 @ 23:57]      CAPILLARY BLOOD GLUCOSE  POCT Blood Glucose.: 192 mg/dL (21 Apr 2024 08:13)  POCT Blood Glucose.: 236 mg/dL (20 Apr 2024 20:59)  POCT Blood Glucose.: 254 mg/dL (20 Apr 2024 17:47)  POCT Blood Glucose.: 270 mg/dL (20 Apr 2024 12:42)          
[facility] DIVISION OF KIDNEY DISEASES AND HYPERTENSION -- FOLLOW UP NOTE  --------------------------------------------------------------------------------  Authored by: Nathan Garcia   Cell # [phone]     GISELAAFIA VYASANTWON was seen and examined at bedside.   Patient is a 79y old  Male who presents with a chief complaint of Kidney Transplant Candidate (04-20-24)      24 hour events/subjective: No major events. Had hematuria,  CBC stable. Tolerating diet, no fever.  UOP 2 liters.   -160, blood glucose 200-300.         Standing Inpatient Medications  allopurinol 100 milliGRAM(s) Oral daily  atorvastatin 40 milliGRAM(s) Oral at bedtime  escitalopram 10 milliGRAM(s) Oral daily  famotidine    Tablet 20 milliGRAM(s) Oral daily  heparin   Injectable 5000 Unit(s) SubCutaneous every 12 hours  insulin glargine Injectable (LANTUS) 18 Unit(s) SubCutaneous at bedtime  insulin lispro (ADMELOG) corrective regimen sliding scale   SubCutaneous at bedtime  insulin lispro (ADMELOG) corrective regimen sliding scale   SubCutaneous three times a day before meals  insulin lispro Injectable (ADMELOG) 5 Unit(s) SubCutaneous three times a day before meals  mycophenolate mofetil 1 Gram(s) Oral every 12 hours  nystatin    Suspension 762399 Unit(s) Swish and Swallow four times a day  polyethylene glycol 3350 17 Gram(s) Oral daily  senna 2 Tablet(s) Oral at bedtime  trimethoprim   80 mG/sulfamethoxazole 400 mG 1 Tablet(s) Oral daily  valGANciclovir 450 milliGRAM(s) Oral daily    PRN Inpatient Medications  dextrose Oral Gel 15 Gram(s) Oral once PRN  diphenhydrAMINE Injectable 25 milliGRAM(s) IV Push every 8 hours PRN  nalbuphine Injectable 2.5 milliGRAM(s) IV Push every 6 hours PRN  naloxone Injectable 0.1 milliGRAM(s) IV Push every 3 minutes PRN  ondansetron Injectable 4 milliGRAM(s) IV Push every 6 hours PRN  ondansetron Injectable 4 milliGRAM(s) IV Push every 6 hours PRN  traMADol 25 milliGRAM(s) Oral every 6 hours PRN  traMADol 50 milliGRAM(s) Oral every 8 hours PRN        VITALS/PHYSICAL EXAM  --------------------------------------------------------------------------------  T(C): 36.7 (04-20-24 @ 09:00), Max: 36.9 (04-19-24 @ 13:00)  HR: 81 (04-20-24 @ 09:00) (72 - 84)  BP: 165/78 (04-20-24 @ 09:00) (152/71 - 169/79)  RR: 18 (04-20-24 @ 09:00) (18 - 18)  SpO2: 96% (04-20-24 @ 09:00) (96% - 99%)        04-19-24 @ 07:01  -  04-20-24 @ 07:00  --------------------------------------------------------  IN: 2352 mL / OUT: 2135 mL / NET: 217 mL    04-20-24 @ 07:01  -  04-20-24 @ 09:46  --------------------------------------------------------  IN: 240 mL / OUT: 190 mL / NET: 50 mL      Physical Exam:  Awake and alert  Resting comfortably , respirations non labored   Lungs clear   Abdomen soft, non distended, incision clean melvi and intact. GLADIS serosanguinous ~ 100ml  Calderon in place, blood tinged urine   No edema   LUE AVF   Alert and oriented        LABS/STUDIES  --------------------------------------------------------------------------------              9.5    6.41  >-----------<  89       [04-20-24 @ 06:39]              29.5     138  |  104  |  71  ----------------------------<  241      [04-20-24 @ 06:39]  4.5   |  22  |  3.10        Ca     8.9     [04-20-24 @ 06:39]      Mg     2.2     [04-20-24 @ 06:39]      Phos  3.5     [04-20-24 @ 06:39]    TPro  5.2  /  Alb  3.1  /  TBili  0.3  /  DBili  x   /  AST  31  /  ALT  26  /  AlkPhos  73  [04-20-24 @ 06:39]        Creatinine Trend:  SCr 3.10 [04-20 @ 06:39]  SCr 4.55 [04-19 @ 06:34]  SCr 7.41 [04-18 @ 06:42]  SCr 7.99 [04-17 @ 23:57]  SCr 8.41 [04-17 @ 18:34]      CAPILLARY BLOOD GLUCOSE  POCT Blood Glucose.: 256 mg/dL (20 Apr 2024 08:39)  POCT Blood Glucose.: 309 mg/dL (19 Apr 2024 21:11)  POCT Blood Glucose.: 211 mg/dL (19 Apr 2024 17:36)  POCT Blood Glucose.: 202 mg/dL (19 Apr 2024 12:45)    
Transplant Surgery - Multidisciplinary Progress Note  --------------------------------------------------------------  R DDRT 4/17/24  POD#3    Present: Patient seen and examined with multidisciplinary team Surgeon Dr. Vaughan, Nephrologist Dr. Garcia, [facility]. Disciplines not in attendance will be notified of the plan.    HPI: 78 YO M with known CKD (2013), ESRD (2/2018) follows at \[facility]\"" dialysis unit [facility] dialysis unit. , DM (age 57) not on insulin; HTN (age 57). No known h/o kidney stone/ Prostatism. No hematuria/Nocturia/Transfusions. Urine out put: 1-2 cups/day. Has no h/o UTI. Pneumonia 25 years ago, was not hospitalized. No illness or hospitalization in the last 6 months. No history of kidney/ bladder/ prostate surgery. Non smoker. Quit 40 years ago, 1/2 ppd for 5 years. Left forearm AVG, Dr. Retana and Dr. Montes De Oca. Presents in usual state of health. Now S/P DDRT  POD#0.     Interval Events:  - Afebrile VSS  - POD#3  - hematuria, stable CBC  - HELD SQH  - 3rd dose of thymo    Immunosuppression:   Induction:  Thymoglobulin                                            Nicole, MMF 1/1, SST  Ongoing monitoring for signs of rejection.   Potential Discharge date: Pending clinical course   Education:  Medications  Plan of care:  See Below      MEDICATIONS  (STANDING):  allopurinol 100 milliGRAM(s) Oral daily  atorvastatin 40 milliGRAM(s) Oral at bedtime  chlorhexidine 2% Cloths 1 Application(s) Topical daily  dextrose 10% Bolus 125 milliLiter(s) IV Bolus once  dextrose 5%. 1000 milliLiter(s) (100 mL/Hr) IV Continuous <Continuous>  dextrose 5%. 1000 milliLiter(s) (50 mL/Hr) IV Continuous <Continuous>  dextrose 50% Injectable 25 Gram(s) IV Push once  dextrose 50% Injectable 12.5 Gram(s) IV Push once  escitalopram 10 milliGRAM(s) Oral daily  famotidine    Tablet 20 milliGRAM(s) Oral daily  glucagon  Injectable 1 milliGRAM(s) IntraMuscular once  influenza  Vaccine (HIGH DOSE) 0.7 milliLiter(s) IntraMuscular once  insulin glargine Injectable (LANTUS) 18 Unit(s) SubCutaneous at bedtime  insulin lispro (ADMELOG) corrective regimen sliding scale   SubCutaneous at bedtime  insulin lispro (ADMELOG) corrective regimen sliding scale   SubCutaneous three times a day before meals  insulin lispro Injectable (ADMELOG) 5 Unit(s) SubCutaneous three times a day before meals  mycophenolate mofetil 1 Gram(s) Oral every 12 hours  nystatin    Suspension 318157 Unit(s) Swish and Swallow four times a day  polyethylene glycol 3350 17 Gram(s) Oral daily  senna 2 Tablet(s) Oral at bedtime  trimethoprim   80 mG/sulfamethoxazole 400 mG 1 Tablet(s) Oral daily  valGANciclovir 450 milliGRAM(s) Oral daily    MEDICATIONS  (PRN):  dextrose Oral Gel 15 Gram(s) Oral once PRN Blood Glucose LESS THAN 70 milliGRAM(s)/deciliter  diphenhydrAMINE Injectable 25 milliGRAM(s) IV Push every 8 hours PRN Pruritus  nalbuphine Injectable 2.5 milliGRAM(s) IV Push every 6 hours PRN Pruritus  naloxone Injectable 0.1 milliGRAM(s) IV Push every 3 minutes PRN For ANY of the following changes in patient status:  A. RR LESS THAN 10 breaths per minute, B. Oxygen saturation LESS THAN 90%, C. Sedation score of 6  ondansetron Injectable 4 milliGRAM(s) IV Push every 6 hours PRN Nausea  ondansetron Injectable 4 milliGRAM(s) IV Push every 6 hours PRN Nausea and/or Vomiting  traMADol 25 milliGRAM(s) Oral every 6 hours PRN Moderate Pain (4 - 6)  traMADol 50 milliGRAM(s) Oral every 8 hours PRN Severe Pain (7 - 10)      PAST MEDICAL & SURGICAL HISTORY:  Diabetes mellitus, type 2      Essential (primary) hypertension      GERD without esophagitis      ESRD (end stage renal disease)      Stage 4 chronic kidney disease      Head injury with loss of consciousness  2014      Chronic gout of multiple sites, unspecified cause      Hyperlipidemia, unspecified hyperlipidemia type      Enlarged prostate      History of appendectomy          Vital Signs Last 24 Hrs  T(C): 36.8 (20 Apr 2024 05:02), Max: 36.9 (19 Apr 2024 13:00)  T(F): 98.2 (20 Apr 2024 05:02), Max: 98.5 (19 Apr 2024 13:00)  HR: 77 (20 Apr 2024 05:02) (72 - 84)  BP: 159/78 (20 Apr 2024 05:02) (152/71 - 169/79)  BP(mean): 112 (20 Apr 2024 05:02) (103 - 117)  RR: 18 (20 Apr 2024 05:02) (18 - 18)  SpO2: 96% (20 Apr 2024 05:02) (96% - 99%)    Parameters below as of 20 Apr 2024 05:02  Patient On (Oxygen Delivery Method): room air        I&O's Summary    19 Apr 2024 07:01  -  20 Apr 2024 07:00  --------------------------------------------------------  IN: 2352 mL / OUT: 2135 mL / NET: 217 mL    20 Apr 2024 07:01  -  20 Apr 2024 09:24  --------------------------------------------------------  IN: 240 mL / OUT: 190 mL / NET: 50 mL                              9.5    6.41  )-----------( 89       ( 20 Apr 2024 06:39 )             29.5     04-20    138  |  104  |  71<H>  ----------------------------<  241<H>  4.5   |  22  |  3.10<H>    Ca    8.9      20 Apr 2024 06:39  Phos  3.5     04-20  Mg     2.2     04-20    TPro  5.2<L>  /  Alb  3.1<L>  /  TBili  0.3  /  DBili  x   /  AST  31  /  ALT  26  /  AlkPhos  73  04-20         Review of systems  Gen: No weight changes, fatigue, fevers/chills, weakness  Skin: No rashes  Head/Eyes/Ears/Mouth: No headache; Normal hearing; Normal vision w/o blurriness; No sinus pain/discomfort, sore throat  Respiratory: No dyspnea, cough, wheezing, hemoptysis  CV: No chest pain, PND, orthopnea  GI: C/O mild abdominal pain at surgical site, No diarrhea, constipation, nausea, vomiting, melena, hematochezia  : No increased frequency, dysuria, hematuria, nocturia  MSK: No joint pain/swelling; no back pain; no edema  Neuro: No dizziness/lightheadedness, weakness, seizures, numbness, tingling  Heme: No easy bruising or bleeding  Endo: No heat/cold intolerance  Psych: No significant nervousness, anxiety, stress, depression  All other systems were reviewed and are negative, except as noted.      PHYSICAL EXAM:  Constitutional: Well developed / well nourished  Eyes: Anicteric, PERRLA  ENMT: nc/at  Neck: Supple, trachea midline, line c/d/i  Respiratory: CTA B/L  Cardiovascular: RRR  Gastrointestinal: Soft, non distended, appropriate incisional TTP. dressing c/d/i. JPx1 ss  Genitourinary: rubin in place  Extremities: SCD's in place and working bilaterally  Vascular: Palpable dp pulses bilaterally  Neurological: A&O x3  Skin: no rashes, ulcerations or lesions;  Musculoskeletal: Moving all extremities  Psychiatric: Responsive 
Transplant Surgery - Multidisciplinary Progress Note  --------------------------------------------------------------  R DDRT 4/17/24  POD#5    Present: Patient seen and examined with multidisciplinary team Surgeon Dr. Molina, Nephrologist Dr. Garcia, [facility]. Disciplines not in attendance will be notified of the plan.    HPI: 80 YO M with known CKD (2013), ESRD (2/2018) follows at \[facility]\"" dialysis unit [facility] dialysis unit. , DM (age 57) not on insulin; HTN (age 57). No known h/o kidney stone/ Prostatism. No hematuria/Nocturia/Transfusions. Urine out put: 1-2 cups/day. Has no h/o UTI. Pneumonia 25 years ago, was not hospitalized. No illness or hospitalization in the last 6 months. No history of kidney/ bladder/ prostate surgery. Non smoker. Quit 40 years ago, 1/2 ppd for 5 years. Left forearm AVG, Dr. Retana and Dr. Montes De Oca. Presents in usual state of health. Now S/P DDRT      Interval Events:  - Afebrile, hypertensive, inc coreg to 12.5 BID        Immunosuppression:   Induction:  Thymoglobulin completed                                            Nicole, MMF 1/1, SST  Ongoing monitoring for signs of rejection.   Potential Discharge date: Pending clinical course   Education:  Medications  Plan of care:  See Below        MEDICATIONS  (STANDING):  allopurinol 100 milliGRAM(s) Oral daily  atorvastatin 40 milliGRAM(s) Oral at bedtime  belatacept IVPB 750 milliGRAM(s) IV Intermittent once  carvedilol 12.5 milliGRAM(s) Oral every 12 hours  chlorhexidine 2% Cloths 1 Application(s) Topical daily  dextrose 10% Bolus 125 milliLiter(s) IV Bolus once  dextrose 5%. 1000 milliLiter(s) (50 mL/Hr) IV Continuous <Continuous>  dextrose 5%. 1000 milliLiter(s) (100 mL/Hr) IV Continuous <Continuous>  dextrose 50% Injectable 12.5 Gram(s) IV Push once  dextrose 50% Injectable 25 Gram(s) IV Push once  escitalopram 10 milliGRAM(s) Oral daily  famotidine    Tablet 20 milliGRAM(s) Oral daily  glucagon  Injectable 1 milliGRAM(s) IntraMuscular once  heparin   Injectable 5000 Unit(s) SubCutaneous every 12 hours  influenza  Vaccine (HIGH DOSE) 0.7 milliLiter(s) IntraMuscular once  insulin glargine Injectable (LANTUS) 20 Unit(s) SubCutaneous at bedtime  insulin lispro (ADMELOG) corrective regimen sliding scale   SubCutaneous at bedtime  insulin lispro (ADMELOG) corrective regimen sliding scale   SubCutaneous three times a day before meals  insulin lispro Injectable (ADMELOG) 10 Unit(s) SubCutaneous three times a day before meals  mycophenolate mofetil 1 Gram(s) Oral every 12 hours  NIFEdipine XL 30 milliGRAM(s) Oral daily  nystatin    Suspension 521114 Unit(s) Swish and Swallow four times a day  polyethylene glycol 3350 17 Gram(s) Oral daily  predniSONE   Tablet 10 milliGRAM(s) Oral once  predniSONE   Tablet   Oral   senna 2 Tablet(s) Oral at bedtime  trimethoprim   80 mG/sulfamethoxazole 400 mG 1 Tablet(s) Oral daily  valGANciclovir 450 milliGRAM(s) Oral daily    MEDICATIONS  (PRN):  dextrose Oral Gel 15 Gram(s) Oral once PRN Blood Glucose LESS THAN 70 milliGRAM(s)/deciliter  diphenhydrAMINE Injectable 25 milliGRAM(s) IV Push every 8 hours PRN Pruritus  nalbuphine Injectable 2.5 milliGRAM(s) IV Push every 6 hours PRN Pruritus  naloxone Injectable 0.1 milliGRAM(s) IV Push every 3 minutes PRN For ANY of the following changes in patient status:  A. RR LESS THAN 10 breaths per minute, B. Oxygen saturation LESS THAN 90%, C. Sedation score of 6  ondansetron Injectable 4 milliGRAM(s) IV Push every 6 hours PRN Nausea and/or Vomiting  ondansetron Injectable 4 milliGRAM(s) IV Push every 6 hours PRN Nausea  traMADol 50 milliGRAM(s) Oral every 8 hours PRN Severe Pain (7 - 10)  traMADol 25 milliGRAM(s) Oral every 6 hours PRN Moderate Pain (4 - 6)      PAST MEDICAL & SURGICAL HISTORY:  Diabetes mellitus, type 2      Essential (primary) hypertension      GERD without esophagitis      ESRD (end stage renal disease)      Stage 4 chronic kidney disease      Head injury with loss of consciousness  2014      Chronic gout of multiple sites, unspecified cause      Hyperlipidemia, unspecified hyperlipidemia type      Enlarged prostate      History of appendectomy          Vital Signs Last 24 Hrs  T(C): 36.8 (22 Apr 2024 08:30), Max: 36.8 (21 Apr 2024 21:00)  T(F): 98.2 (22 Apr 2024 08:30), Max: 98.3 (22 Apr 2024 04:56)  HR: 80 (22 Apr 2024 09:41) (73 - 80)  BP: 167/62 (22 Apr 2024 08:30) (147/71 - 173/73)  BP(mean): 105 (22 Apr 2024 08:30) (105 - 105)  RR: 18 (22 Apr 2024 08:30) (17 - 18)  SpO2: 96% (22 Apr 2024 08:30) (95% - 100%)    Parameters below as of 22 Apr 2024 08:30  Patient On (Oxygen Delivery Method): room air        I&O's Summary    21 Apr 2024 07:01  -  22 Apr 2024 07:00  --------------------------------------------------------  IN: 1440 mL / OUT: 2245 mL / NET: -805 mL    22 Apr 2024 07:01  -  22 Apr 2024 10:51  --------------------------------------------------------  IN: 240 mL / OUT: 220 mL / NET: 20 mL                              9.5    5.03  )-----------( 107      ( 22 Apr 2024 07:24 )             29.2     04-22    139  |  107  |  58<H>  ----------------------------<  93  4.6   |  20<L>  |  1.84<H>    Ca    8.9      22 Apr 2024 07:24  Phos  3.0     04-22  Mg     2.0     04-22    TPro  5.2<L>  /  Alb  3.2<L>  /  TBili  0.6  /  DBili  x   /  AST  31  /  ALT  36  /  AlkPhos  84  04-22        Review of systems  Gen: No weight changes, fatigue, fevers/chills, weakness  Skin: No rashes  Head/Eyes/Ears/Mouth: No headache; Normal hearing; Normal vision w/o blurriness; No sinus pain/discomfort, sore throat  Respiratory: No dyspnea, cough, wheezing, hemoptysis  CV: No chest pain, PND, orthopnea  GI: C/O mild abdominal pain at surgical site, No diarrhea, constipation, nausea, vomiting, melena, hematochezia  : No increased frequency, dysuria, hematuria, nocturia  MSK: No joint pain/swelling; no back pain; no edema  Neuro: No dizziness/lightheadedness, weakness, seizures, numbness, tingling  Heme: No easy bruising or bleeding  Endo: No heat/cold intolerance  Psych: No significant nervousness, anxiety, stress, depression  All other systems were reviewed and are negative, except as noted.      PHYSICAL EXAM:  Constitutional: Well developed / well nourished  Eyes: Anicteric, PERRLA  ENMT: nc/at  Neck: Supple, trachea midline, line c/d/i  Respiratory: CTA B/L  Cardiovascular: RRR  Gastrointestinal: Soft, non distended, appropriate incisional TTP. dressing c/d/i. JPx1 ss  Genitourinary: rubin in place  Extremities: SCD's in place and working bilaterally  Vascular: Palpable dp pulses bilaterally  Neurological: A&O x3  Skin: no rashes, ulcerations or lesions;  Musculoskeletal: Moving all extremities  Psychiatric: Responsive 
Transplant Surgery - Multidisciplinary Progress Note  --------------------------------------------------------------  R DDRT 4/17/24  POD#1    80 YO M with known CKD (2013), ESRD (2/2018) follows at [facility] dialysis unit [facility] dialysis unit. , DM (age 57) not on insulin; HTN (age 57). No known h/o kidney stone/ Prostatism. No hematuria/Nocturia/Transfusions. Urine out put: 1-2 cups/day. Has no h/o UTI. Pneumonia 25 years ago, was not hospitalized. No illness or hospitalization in the last 6 months. No history of kidney/ bladder/ prostate surgery. Non smoker. Quit 40 years ago, 1/2 ppd for 5 years. Left forearm AVG, Dr. Retana and Dr. Montes De Oca. Presents in usual state of health. Now S/P DDRT  POD#0.     Interval Events:  - Afebrile. S/P 500cc x1, 1U PRBC in PACU.   - Graft: 50cc/hr, doppler with good flow  - AAOx3  - GLADIS OP: 430cc sang.     Immunosuppression:   Induction:  Thymoglobulin                                            Nicole, MMF 1/1, SST  Ongoing monitoring for signs of rejection.     Potential Discharge date: Pending clinical course   Education:  Medications  Plan of care:  See Below      MEDICATIONS  (STANDING):  acetaminophen     Tablet .. 650 milliGRAM(s) Oral once  antithymocyte globulin rabbit (peripheral) IVPB w/additives 125 milliGRAM(s) IV Intermittent once  belatacept IVPB 750 milliGRAM(s) IV Intermittent once  chlorhexidine 2% Cloths 1 Application(s) Topical daily  dextrose 10% Bolus 125 milliLiter(s) IV Bolus once  dextrose 5%. 1000 milliLiter(s) (50 mL/Hr) IV Continuous <Continuous>  dextrose 5%. 1000 milliLiter(s) (100 mL/Hr) IV Continuous <Continuous>  dextrose 50% Injectable 25 Gram(s) IV Push once  dextrose 50% Injectable 12.5 Gram(s) IV Push once  diphenhydrAMINE 25 milliGRAM(s) Oral once  famotidine    Tablet 20 milliGRAM(s) Oral daily  glucagon  Injectable 1 milliGRAM(s) IntraMuscular once  heparin   Injectable 5000 Unit(s) SubCutaneous every 12 hours  influenza  Vaccine (HIGH DOSE) 0.7 milliLiter(s) IntraMuscular once  insulin lispro (ADMELOG) corrective regimen sliding scale   SubCutaneous three times a day before meals  insulin lispro (ADMELOG) corrective regimen sliding scale   SubCutaneous at bedtime  methylPREDNISolone sodium succinate IVPB 250 milliGRAM(s) IV Intermittent once  multiple electrolytes Injection Type 1 1000 milliLiter(s) (70 mL/Hr) IV Continuous <Continuous>  mycophenolate mofetil 1 Gram(s) Oral every 12 hours  nystatin    Suspension 505076 Unit(s) Swish and Swallow four times a day  polyethylene glycol 3350 17 Gram(s) Oral daily  senna 2 Tablet(s) Oral at bedtime  trimethoprim   80 mG/sulfamethoxazole 400 mG 1 Tablet(s) Oral daily  valGANciclovir 450 milliGRAM(s) Oral <User Schedule>    MEDICATIONS  (PRN):  dextrose Oral Gel 15 Gram(s) Oral once PRN Blood Glucose LESS THAN 70 milliGRAM(s)/deciliter  diphenhydrAMINE Injectable 25 milliGRAM(s) IV Push every 8 hours PRN Pruritus  nalbuphine Injectable 2.5 milliGRAM(s) IV Push every 6 hours PRN Pruritus  naloxone Injectable 0.1 milliGRAM(s) IV Push every 3 minutes PRN For ANY of the following changes in patient status:  A. RR LESS THAN 10 breaths per minute, B. Oxygen saturation LESS THAN 90%, C. Sedation score of 6  ondansetron Injectable 4 milliGRAM(s) IV Push every 6 hours PRN Nausea  ondansetron Injectable 4 milliGRAM(s) IV Push every 6 hours PRN Nausea and/or Vomiting  traMADol 25 milliGRAM(s) Oral every 6 hours PRN Moderate Pain (4 - 6)  traMADol 50 milliGRAM(s) Oral every 8 hours PRN Severe Pain (7 - 10)      PAST MEDICAL & SURGICAL HISTORY:  Diabetes mellitus, type 2      Essential (primary) hypertension      GERD without esophagitis      ESRD (end stage renal disease)      Stage 4 chronic kidney disease      Head injury with loss of consciousness  2014      Chronic gout of multiple sites, unspecified cause      Hyperlipidemia, unspecified hyperlipidemia type      Enlarged prostate      History of appendectomy          Vital Signs Last 24 Hrs  T(C): 36.7 (18 Apr 2024 10:04), Max: 37 (17 Apr 2024 11:32)  T(F): 98.1 (18 Apr 2024 10:04), Max: 98.6 (17 Apr 2024 11:32)  HR: 93 (18 Apr 2024 10:21) (66 - 93)  BP: 138/72 (18 Apr 2024 10:21) (94/52 - 183/78)  BP(mean): 105 (18 Apr 2024 10:04) (70 - 107)  RR: 18 (18 Apr 2024 10:04) (16 - 18)  SpO2: 96% (18 Apr 2024 10:21) (93% - 100%)    Parameters below as of 18 Apr 2024 10:21  Patient On (Oxygen Delivery Method): room air        I&O's Summary    17 Apr 2024 07:01  -  18 Apr 2024 07:00  --------------------------------------------------------  IN: 1780 mL / OUT: 1330 mL / NET: 450 mL    18 Apr 2024 07:01  -  18 Apr 2024 10:46  --------------------------------------------------------  IN: 840 mL / OUT: 260 mL / NET: 580 mL                              12.4   14.56 )-----------( 130      ( 18 Apr 2024 06:42 )             37.9     04-18    140  |  101  |  75<H>  ----------------------------<  183<H>  4.7   |  19<L>  |  7.41<H>    Ca    9.1      18 Apr 2024 06:42  Phos  6.0     04-18  Mg     2.3     04-18    TPro  5.8<L>  /  Alb  3.1<L>  /  TBili  0.3  /  DBili  x   /  AST  36  /  ALT  26  /  AlkPhos  87  04-18    Review of systems  Gen: No weight changes, fatigue, fevers/chills, weakness  Skin: No rashes  Head/Eyes/Ears/Mouth: No headache; Normal hearing; Normal vision w/o blurriness; No sinus pain/discomfort, sore throat  Respiratory: No dyspnea, cough, wheezing, hemoptysis  CV: No chest pain, PND, orthopnea  GI: C/O mild abdominal pain at surgical site, No diarrhea, constipation, nausea, vomiting, melena, hematochezia  : No increased frequency, dysuria, hematuria, nocturia  MSK: No joint pain/swelling; no back pain; no edema  Neuro: No dizziness/lightheadedness, weakness, seizures, numbness, tingling  Heme: No easy bruising or bleeding  Endo: No heat/cold intolerance  Psych: No significant nervousness, anxiety, stress, depression  All other systems were reviewed and are negative, except as noted.      PHYSICAL EXAM:  Constitutional: Well developed / well nourished  Eyes: Anicteric, PERRLA  ENMT: nc/at  Neck: Supple, trachea midline, line c/d/i  Respiratory: CTA B/L  Cardiovascular: RRR  Gastrointestinal: Soft, non distended, appropriate incisional TTP. dressing c/d/i. JPx1 ss  Genitourinary: rubin in place  Extremities: SCD's in place and working bilaterally  Vascular: Palpable dp pulses bilaterally  Neurological: A&O x3  Skin: no rashes, ulcerations or lesions;  Musculoskeletal: Moving all extremities  Psychiatric: Responsive 
Transplant Surgery - Multidisciplinary Progress Note  --------------------------------------------------------------  R DDRT 4/17/24  POD#2    Present: Patient seen and examined with multidisciplinary team Surgeon Dr. Vaughan, Nephrologist Dr. Luu, ACP Angel, Pharmacist Thor. Disciplines not in attendance will be notified of the plan.    HPI: 80 YO M with known CKD (2013), ESRD (2/2018) follows at [facility] dialysis unit [facility] dialysis unit. , DM (age 57) not on insulin; HTN (age 57). No known h/o kidney stone/ Prostatism. No hematuria/Nocturia/Transfusions. Urine out put: 1-2 cups/day. Has no h/o UTI. Pneumonia 25 years ago, was not hospitalized. No illness or hospitalization in the last 6 months. No history of kidney/ bladder/ prostate surgery. Non smoker. Quit 40 years ago, 1/2 ppd for 5 years. Left forearm AVG, Dr. Retana and Dr. Montes De Oca. Presents in usual state of health. Now S/P DDRT  POD#0.     Interval Events:  - Afebrile. Dru thymo  - Graft: doppler with good flow, UO 1.8L  - AAOx3  - GLADIS OP: 190cc sang.     Immunosuppression:   Induction:  Thymoglobulin                                            Nicole, MMF 1/1, SST    MEDICATIONS  (STANDING):  acetaminophen     Tablet .. 650 milliGRAM(s) Oral once  antithymocyte globulin rabbit (peripheral) IVPB w/additives 100 milliGRAM(s) IV Intermittent once  chlorhexidine 2% Cloths 1 Application(s) Topical daily  dextrose 10% Bolus 125 milliLiter(s) IV Bolus once  dextrose 5%. 1000 milliLiter(s) (100 mL/Hr) IV Continuous <Continuous>  dextrose 5%. 1000 milliLiter(s) (50 mL/Hr) IV Continuous <Continuous>  dextrose 50% Injectable 12.5 Gram(s) IV Push once  dextrose 50% Injectable 25 Gram(s) IV Push once  diphenhydrAMINE 25 milliGRAM(s) Oral once  famotidine    Tablet 20 milliGRAM(s) Oral daily  glucagon  Injectable 1 milliGRAM(s) IntraMuscular once  influenza  Vaccine (HIGH DOSE) 0.7 milliLiter(s) IntraMuscular once  insulin glargine Injectable (LANTUS) 18 Unit(s) SubCutaneous at bedtime  insulin lispro (ADMELOG) corrective regimen sliding scale   SubCutaneous three times a day before meals  insulin lispro (ADMELOG) corrective regimen sliding scale   SubCutaneous at bedtime  insulin lispro Injectable (ADMELOG) 5 Unit(s) SubCutaneous three times a day before meals  methylPREDNISolone sodium succinate Injectable 125 milliGRAM(s) IV Push once  mycophenolate mofetil 1 Gram(s) Oral every 12 hours  nystatin    Suspension 350358 Unit(s) Swish and Swallow four times a day  polyethylene glycol 3350 17 Gram(s) Oral daily  senna 2 Tablet(s) Oral at bedtime  trimethoprim   80 mG/sulfamethoxazole 400 mG 1 Tablet(s) Oral daily  valGANciclovir 450 milliGRAM(s) Oral <User Schedule>    MEDICATIONS  (PRN):  dextrose Oral Gel 15 Gram(s) Oral once PRN Blood Glucose LESS THAN 70 milliGRAM(s)/deciliter  diphenhydrAMINE Injectable 25 milliGRAM(s) IV Push every 8 hours PRN Pruritus  nalbuphine Injectable 2.5 milliGRAM(s) IV Push every 6 hours PRN Pruritus  naloxone Injectable 0.1 milliGRAM(s) IV Push every 3 minutes PRN For ANY of the following changes in patient status:  A. RR LESS THAN 10 breaths per minute, B. Oxygen saturation LESS THAN 90%, C. Sedation score of 6  ondansetron Injectable 4 milliGRAM(s) IV Push every 6 hours PRN Nausea  ondansetron Injectable 4 milliGRAM(s) IV Push every 6 hours PRN Nausea and/or Vomiting  traMADol 25 milliGRAM(s) Oral every 6 hours PRN Moderate Pain (4 - 6)  traMADol 50 milliGRAM(s) Oral every 8 hours PRN Severe Pain (7 - 10)      PAST MEDICAL & SURGICAL HISTORY:  Diabetes mellitus, type 2      Essential (primary) hypertension      GERD without esophagitis      ESRD (end stage renal disease)      Stage 4 chronic kidney disease      Head injury with loss of consciousness  2014      Chronic gout of multiple sites, unspecified cause      Hyperlipidemia, unspecified hyperlipidemia type      Enlarged prostate      History of appendectomy          Vital Signs Last 24 Hrs  T(C): 36.7 (19 Apr 2024 09:00), Max: 37 (18 Apr 2024 14:22)  T(F): 98 (19 Apr 2024 09:00), Max: 98.6 (18 Apr 2024 14:22)  HR: 80 (19 Apr 2024 09:00) (70 - 82)  BP: 164/73 (19 Apr 2024 09:00) (129/60 - 176/78)  BP(mean): 104 (19 Apr 2024 05:00) (93 - 112)  RR: 18 (19 Apr 2024 09:00) (18 - 18)  SpO2: 96% (19 Apr 2024 09:00) (93% - 97%)    Parameters below as of 19 Apr 2024 09:00  Patient On (Oxygen Delivery Method): room air        I&O's Summary    18 Apr 2024 07:01  -  19 Apr 2024 07:00  --------------------------------------------------------  IN: 3159 mL / OUT: 2050 mL / NET: 1109 mL    19 Apr 2024 07:01  -  19 Apr 2024 11:33  --------------------------------------------------------  IN: 480 mL / OUT: 290 mL / NET: 190 mL                              10.2   9.57  )-----------( 105      ( 19 Apr 2024 06:35 )             30.9     04-19    139  |  103  |  72<H>  ----------------------------<  223<H>  4.4   |  21<L>  |  4.55<H>    Ca    9.2      19 Apr 2024 06:34  Phos  4.8     04-19  Mg     2.4     04-19    TPro  5.5<L>  /  Alb  3.0<L>  /  TBili  0.2  /  DBili  x   /  AST  26  /  ALT  17  /  AlkPhos  73  04-19                Ongoing monitoring for signs of rejection.     Potential Discharge date: Pending clinical course   Education:  Medications  Plan of care:  See Below       Review of systems  Gen: No weight changes, fatigue, fevers/chills, weakness  Skin: No rashes  Head/Eyes/Ears/Mouth: No headache; Normal hearing; Normal vision w/o blurriness; No sinus pain/discomfort, sore throat  Respiratory: No dyspnea, cough, wheezing, hemoptysis  CV: No chest pain, PND, orthopnea  GI: C/O mild abdominal pain at surgical site, No diarrhea, constipation, nausea, vomiting, melena, hematochezia  : No increased frequency, dysuria, hematuria, nocturia  MSK: No joint pain/swelling; no back pain; no edema  Neuro: No dizziness/lightheadedness, weakness, seizures, numbness, tingling  Heme: No easy bruising or bleeding  Endo: No heat/cold intolerance  Psych: No significant nervousness, anxiety, stress, depression  All other systems were reviewed and are negative, except as noted.      PHYSICAL EXAM:  Constitutional: Well developed / well nourished  Eyes: Anicteric, PERRLA  ENMT: nc/at  Neck: Supple, trachea midline, line c/d/i  Respiratory: CTA B/L  Cardiovascular: RRR  Gastrointestinal: Soft, non distended, appropriate incisional TTP. dressing c/d/i. JPx1 ss  Genitourinary: rubin in place  Extremities: SCD's in place and working bilaterally  Vascular: Palpable dp pulses bilaterally  Neurological: A&O x3  Skin: no rashes, ulcerations or lesions;  Musculoskeletal: Moving all extremities  Psychiatric: Responsive

## 2024-04-22 NOTE — DISCHARGE NOTE PROVIDER - CARE PROVIDER_API CALL
Sg Vaughan Adrian  Surgery  [address] [place] [id number]  Phone: [phone]  Fax: [phone]  Follow Up Time:

## 2024-04-22 NOTE — PROGRESS NOTE ADULT - ASSESSMENT
80 YO M with known CKD (2013), ESRD (2/2018) follows at [facility] dialysis unit [facility] dialysis unit. , DM (age 57) not on insulin; HTN (age 57). No known h/o kidney stone/ Prostatism. No hematuria/Nocturia/Transfusions. Urine out put: 1-2 cups/day. Has no h/o UTI. Pneumonia 25 years ago, was not hospitalized. No illness or hospitalization in the last 6 months. No history of kidney/ bladder/ prostate surgery. Non smoker. Quit 40 years ago, 1/2 ppd for 5 years. Left forearm AVG, Dr. Retana and Dr. Montes De Oca. Presents in usual state of health. Now S/P DDRT      [ ] s/p DDRT (POD#5) 1a/1v/1u stented  - US Doppler:  Patent Vasculature.    - strict I&Os: GLADIS  - rubin removed    - PO diet  - SCDs/PT/IS  - SQH    [ ] Immunosuppression  - Nicole 1st dose 4/18/24 next dose today 4/22/24  - Completed thymo induction  -MMF 1/1, pred taper  - ppx: nystatin, valcyte, bactrim    [] Donor HCV JUDIE +  - FU HCV PCR and genotype

## 2024-04-22 NOTE — DISCHARGE NOTE NURSING/CASE MANAGEMENT/SOCIAL WORK - PATIENT PORTAL LINK FT
You can access the FollowMyHealth Patient Portal offered by [facility] by registering at the following website: [email or web address]. By joining [facility]’s FollowMyHealth portal, you will also be able to view your health information using other applications (apps) compatible with our system.

## 2024-04-22 NOTE — DISCHARGE NOTE PROVIDER - CARE PROVIDERS DIRECT ADDRESSES
[email or web address]

## 2024-04-22 NOTE — PROGRESS NOTE ADULT - ASSESSMENT
79 year old man with h/o longstanding Diabetes II on insulin and HTN, ESRD on HD since 2018.   Received  donor kidney transplant from a 48 year old, Hep C +ve donor, KDPI 87%  Smooth post op course, doing well, creatinine falling, good UOP.    S/p DDRT on 24   - Good UOP,  Creatinine improving. Electrolytes fair. Euvolemic.   - Hematuria clearing - d/c rubin  - GLADIS output ~ 100, d/c GLADIS drain     Immunosuppression   - Thymo induction completed  - Denovo Belatacept to minimize CNI toxicity risk given vascular sclerosis seen on donor bx, 1st Belatacept 10mg/kg on  and next dose due today.   - 3rd dose will be due 24 - will arrange for outpatient infusion   - MMF 1 gram po bid, steroid taper per protocol    Infection prophylaxis   Valcyte 450mg po daily - CMV intermediate risk D+/R+  Nystatin swish and swallow 4x/day   Bactrim SS daily     Diabetes type II on insulin   - Continue to monitor, adjust Lantus Admelog as needed     HTN : Continue Coreg 12.5mg po bid, Nifedipine 30mg daily. BP goal 120-150 systolic. Avoid excessive lowering to allow better allograft perfusion.   HLD : continue atorvastatin 40mg po qhs   BPH - resume Flomax 0.4mg po qhs   Anemia - mild, continue to monitor for now   Hep C +ve donor - Hep C PCR sent .     D/c home today after Belatacept infusion. F/u in clinic later this week.

## 2024-04-22 NOTE — PROGRESS NOTE ADULT - PROVIDER SPECIALTY LIST ADULT
Pharmacy
Transplant Nephrology
Transplant Nephrology
Transplant Surgery
Transplant Nephrology
Transplant Surgery
Transplant Nephrology
Transplant Surgery
Transplant Surgery
Transplant Nephrology
Transplant Surgery
Transplant Surgery

## 2024-04-22 NOTE — DISCHARGE NOTE PROVIDER - NSDCFUADDAPPT_GEN_ALL_CORE_FT
FOLLOW-UP:  1. Please call to make a follow-up appointment with transplant clinic in one week   2. Please follow up with your primary care physician in one week regarding your hospitalization.  3. Please follow up with clinic in 4-6w for removal of ureteral stent

## 2024-04-22 NOTE — DISCHARGE NOTE PROVIDER - NSDCFUSCHEDAPPT_GEN_ALL_CORE_FT
[facility]  CARDIOLOGY [facility]   Scheduled Appointment: 05/01/2024    [facility]  CARDIOLOGY [address] [place] [address]   Scheduled Appointment: 05/01/2024

## 2024-04-22 NOTE — DISCHARGE NOTE PROVIDER - HOSPITAL COURSE
He was evaluated by the multi-disciplinary team including surgeon, nephrologist, ACP, pharmacist, nutrition, social work, and nursing and deemed stable for discharge with the following plan:     79 year old man with h/o longstanding Diabetes II on insulin and HTN, ESRD on HD since 2018. Received  donor kidney transplant from a 48 year old, Hep C +ve donor, KDPI 87%. HCV PCR pending, follow up outpatient. Smooth post op course, doing well, creatinine 1.84 on discharge, good UOP. Patient to be discharged with one GLADIS drain.     He received final dose of Thymoglobulin. He was discharged home on Belatacept, MMF 1g BID, oral Prednisone taper and prophylactic agents Bactrim, Nystatin and Valcyte 450mg daily.      [] DDRT w/ thymo completed  - Denovo/Nicole  Belatacept dosing  Day 1 (24): 750 mg   Day 5 (2024): 750 mg  Day 15 (2024): 750 mg  Day 29 (2024): 750 mg  Day 57 (2024): 750 mg  Day 85 (2024): 750 mg  Day 113 (8/15/2024): 375 mg  375 mg every 4 weeks after  - One GLADIS drain still active     [] Donor HCV JUDIE +  - HCV PCR pending, follow up outpatient.     []DM  - Lantus 20, premeal 10  - A1c 6.4    []HTN   Nifed 30 qd, coreg 12.5 bid             He was evaluated by the multi-disciplinary team including surgeon, nephrologist, ACP, pharmacist, nutrition, social work, and nursing and deemed stable for discharge with the following plan:     79 year old man with h/o longstanding Diabetes II on insulin and HTN, ESRD on HD since 2018. Received  donor kidney transplant from a 48 year old, Hep C +ve donor, KDPI 87%. HCV PCR pending, follow up outpatient. Smooth post op course, doing well, creatinine 1.84 on discharge, good UOP.     He received final dose of Thymoglobulin. He was discharged home on Belatacept, MMF 1g BID, oral Prednisone taper and prophylactic agents Bactrim, Nystatin and Valcyte 450mg daily.      [] DDRT w/ thymo completed  - Denovo/Nicole  Belatacept dosing  Day 1 (24): 750 mg   Day 5 (2024): 750 mg  Day 15 (2024): 750 mg  Day 29 (2024): 750 mg  Day 57 (2024): 750 mg  Day 85 (2024): 750 mg  Day 113 (8/15/2024): 375 mg  375 mg every 4 weeks after  - Thom removed     [] Donor HCV JUDIE +  - HCV PCR pending, follow up outpatient.     []DM  - Lantus 20, premeal 10  - A1c 6.4    []HTN   Nifed 30 qd, coreg 12.5 bid

## 2024-04-22 NOTE — DISCHARGE NOTE PROVIDER - NSDCCPCAREPLAN_GEN_ALL_CORE_FT
PRINCIPAL DISCHARGE DIAGNOSIS  Diagnosis: Renal transplant recipient  Assessment and Plan of Treatment: - Avoid heavy lifting aything over 5lbs for six weeks following your surgery date. Do NOT drive a car or operate machinery unless cleared by your transplant surgeon during your follow up visit. Avoid straining, use stool softners as needed. Stop stool softners if diarrhea develops.   - Call transplant clinic if you develop fever, increased abdominal pain, redness/swelling or bleeding around your incision site  - Call transplant clinic if you have difficulty urinating, notice decrease in urine output or blood in urine.   - Follow FOOD SAFETY instructions provided to you in your patient education guide booklet   - Bathing: Shower is allowed, you can let soap and water flow over your incision; do NOT rub the area. Bath is NOT allowed until your incision is healed and you have been cleared by your transplant surgeon.

## 2024-04-22 NOTE — DISCHARGE NOTE PROVIDER - NSDCMRMEDTOKEN_GEN_ALL_CORE_FT
allopurinol 100 mg oral tablet: 1 tab(s) orally once a day  aspirin 81 mg oral tablet: 1 tab(s) orally once a day  atorvastatin 40 mg oral tablet: 1 tab(s) orally once a day (at bedtime)  belatacept 250 mg intravenous injection: 750 milligram(s) intravenous once Follow instructions given by transplant team  Coreg 12.5 mg oral tablet: 1 tab(s) orally 2 times a day  escitalopram 10 mg oral tablet: 1 tab(s) orally once a day  famotidine 20 mg oral tablet: 1 tab(s) orally once a day  mycophenolate mofetil 250 mg oral capsule: 1,000 milligram(s) orally every 12 hours  NIFEdipine 30 mg oral tablet, extended release: 1 tab(s) orally once a day  NovoLOG FlexTouch 100 units/mL subcutaneous solution: 10 unit(s) subcutaneous 3 times a day (before meals)  nystatin 100,000 units/mL oral suspension: 5 milliliter(s) orally 4 times a day  predniSONE: 10 milligram(s) orally once a day please follow taper given by transplant team  senna leaf extract oral tablet: 2 tab(s) orally once a day (at bedtime)  sulfamethoxazole-trimethoprim 400 mg-80 mg oral tablet: 1 tab(s) orally once a day  tamsulosin 0.4 mg oral capsule: 1 cap(s) orally once a day  Tradjenta 5 mg oral tablet: 1 tab(s) orally once a day  Tresiba FlexTouch 100 units/mL subcutaneous solution: 20 microcurie subcutaneous once a day (at bedtime)  valGANciclovir 450 mg oral tablet: 1 tab(s) orally once a day

## 2024-04-23 RX ORDER — VELPATASVIR AND SOFOSBUVIR 100; 400 MG/1; MG/1
400-100 TABLET, FILM COATED ORAL DAILY
Qty: 30 | Refills: 2 | Status: ACTIVE | COMMUNITY
Start: 2024-04-23 | End: 1900-01-01

## 2024-04-25 LAB — HCV GENTYP BLD NAA+PROBE: SIGNIFICANT CHANGE UP

## 2024-04-29 ENCOUNTER — APPOINTMENT (OUTPATIENT)
Dept: TRANSPLANT | Facility: CLINIC | Age: 79
End: 2024-04-29
Payer: MEDICARE

## 2024-04-29 ENCOUNTER — OUTPATIENT (OUTPATIENT)
Dept: OUTPATIENT SERVICES | Facility: HOSPITAL | Age: 79
LOS: 1 days | End: 2024-04-29
Payer: COMMERCIAL

## 2024-04-29 VITALS
DIASTOLIC BLOOD PRESSURE: 68 MMHG | WEIGHT: 170 LBS | TEMPERATURE: 97.5 F | HEIGHT: 66 IN | HEART RATE: 83 BPM | SYSTOLIC BLOOD PRESSURE: 115 MMHG | BODY MASS INDEX: 27.32 KG/M2 | OXYGEN SATURATION: 99 %

## 2024-04-29 DIAGNOSIS — Z01.818 ENCOUNTER FOR OTHER PREPROCEDURAL EXAMINATION: ICD-10-CM

## 2024-04-29 DIAGNOSIS — Z90.49 ACQUIRED ABSENCE OF OTHER SPECIFIED PARTS OF DIGESTIVE TRACT: Chronic | ICD-10-CM

## 2024-04-29 DIAGNOSIS — Z94.0 KIDNEY TRANSPLANT STATUS: ICD-10-CM

## 2024-04-29 LAB
ALBUMIN SERPL ELPH-MCNC: 4.1 G/DL
ALP BLD-CCNC: 137 U/L
ALT SERPL-CCNC: 89 U/L
ANION GAP SERPL CALC-SCNC: 11 MMOL/L
APPEARANCE: CLEAR
AST SERPL-CCNC: 34 U/L
BACTERIA: NEGATIVE /HPF
BILIRUB SERPL-MCNC: 0.6 MG/DL
BILIRUBIN URINE: NEGATIVE
BLOOD URINE: ABNORMAL
BUN SERPL-MCNC: 30 MG/DL
CALCIUM SERPL-MCNC: 9.3 MG/DL
CAST: 2 /LPF
CHLORIDE SERPL-SCNC: 111 MMOL/L
CO2 SERPL-SCNC: 17 MMOL/L
COLOR: YELLOW
CREAT SERPL-MCNC: 1.35 MG/DL
CREAT SPEC-SCNC: 66 MG/DL
CREAT/PROT UR: 0.3 RATIO
EGFR: 54 ML/MIN/1.73M2
EPITHELIAL CELLS: 1 /HPF
GLUCOSE QUALITATIVE U: NEGATIVE MG/DL
GLUCOSE SERPL-MCNC: 124 MG/DL
HCT VFR BLD CALC: 30.4 %
HGB BLD-MCNC: 9.7 G/DL
KETONES URINE: NEGATIVE MG/DL
LEUKOCYTE ESTERASE URINE: ABNORMAL
MAGNESIUM SERPL-MCNC: 1.5 MG/DL
MCHC RBC-ENTMCNC: 31.8 PG
MCHC RBC-ENTMCNC: 31.9 GM/DL
MCV RBC AUTO: 99.7 FL
MICROSCOPIC-UA: NORMAL
NITRITE URINE: NEGATIVE
PH URINE: 6
PHOSPHATE SERPL-MCNC: 3.8 MG/DL
PLATELET # BLD AUTO: 203 K/UL
POTASSIUM SERPL-SCNC: 5.2 MMOL/L
PROT SERPL-MCNC: 6 G/DL
PROT UR-MCNC: 17 MG/DL
PROTEIN URINE: NORMAL MG/DL
RBC # BLD: 3.05 M/UL
RBC # FLD: 16.6 %
RED BLOOD CELLS URINE: 6 /HPF
SODIUM SERPL-SCNC: 139 MMOL/L
SPECIFIC GRAVITY URINE: 1.02
UROBILINOGEN URINE: 0.2 MG/DL
WBC # FLD AUTO: 8.37 K/UL
WHITE BLOOD CELLS URINE: 6 /HPF

## 2024-04-29 PROCEDURE — 99214 OFFICE O/P EST MOD 30 MIN: CPT

## 2024-04-29 PROCEDURE — 93970 EXTREMITY STUDY: CPT

## 2024-04-29 PROCEDURE — 93970 EXTREMITY STUDY: CPT | Mod: 26

## 2024-04-29 RX ORDER — PREDNISONE 5 MG/1
5 TABLET ORAL
Qty: 41 | Refills: 0 | Status: DISCONTINUED | COMMUNITY
Start: 2024-04-18 | End: 2024-04-29

## 2024-04-29 NOTE — HISTORY OF PRESENT ILLNESS
[ Donor] :  donor [Thymoglobulin] : thymoglobulin [TextBox_7] : 4- [FreeTextEntry4] : 79 year old male with DM, HTN and ESRD on HD since 2018 received DDRT on April 17 2024 did well with no post op complications discharged home with creatinine 1.84 immunosuppression belatacept, mmf and steroids [de-identified] : today doing well blood pressure remains on the high side eating and ambulating right leg swelling

## 2024-04-29 NOTE — PHYSICAL EXAM
[Normal] : normal [TextBox_34] : right lower ext edema ++ [Clean] : clean [Dry] : dry [Healing Well] : healing well

## 2024-04-29 NOTE — ASSESSMENT
ER DISCHARGE NOTE:

Patient is cleared to be discharged per ERMD, pt is aox4, on room air, with 
stable vital signs. pt was given dc and prescription instructions, pt was able 
to verbalize understanding, pt id band and iv site removed without 
complications. pt is able to ambulate with steady gait. pt took all belongings. 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

## 2024-05-01 ENCOUNTER — APPOINTMENT (OUTPATIENT)
Dept: CARDIOLOGY | Facility: CLINIC | Age: 79
End: 2024-05-01

## 2024-05-02 RX ORDER — SODIUM BICARBONATE 650 MG/1
650 TABLET ORAL
Qty: 60 | Refills: 3 | Status: ACTIVE | COMMUNITY
Start: 2024-04-29 | End: 1900-01-01

## 2024-05-07 ENCOUNTER — NON-APPOINTMENT (OUTPATIENT)
Age: 79
End: 2024-05-07

## 2024-05-07 ENCOUNTER — LABORATORY RESULT (OUTPATIENT)
Age: 79
End: 2024-05-07

## 2024-05-07 ENCOUNTER — APPOINTMENT (OUTPATIENT)
Dept: NEPHROLOGY | Facility: CLINIC | Age: 79
End: 2024-05-07
Payer: MEDICARE

## 2024-05-07 VITALS
TEMPERATURE: 98.1 F | OXYGEN SATURATION: 98 % | HEART RATE: 76 BPM | BODY MASS INDEX: 27.8 KG/M2 | SYSTOLIC BLOOD PRESSURE: 131 MMHG | WEIGHT: 173 LBS | DIASTOLIC BLOOD PRESSURE: 74 MMHG | HEIGHT: 66 IN | RESPIRATION RATE: 16 BRPM

## 2024-05-07 DIAGNOSIS — E78.00 PURE HYPERCHOLESTEROLEMIA, UNSPECIFIED: ICD-10-CM

## 2024-05-07 DIAGNOSIS — N28.9 DISORDER OF KIDNEY AND URETER, UNSPECIFIED: ICD-10-CM

## 2024-05-07 LAB
ALBUMIN SERPL ELPH-MCNC: 4.2 G/DL
ALP BLD-CCNC: 130 U/L
ALT SERPL-CCNC: 44 U/L
ANION GAP SERPL CALC-SCNC: 11 MMOL/L
AST SERPL-CCNC: 23 U/L
BASOPHILS # BLD AUTO: 0.04 K/UL
BASOPHILS NFR BLD AUTO: 0.7 %
BILIRUB SERPL-MCNC: 0.6 MG/DL
BUN SERPL-MCNC: 45 MG/DL
CALCIUM SERPL-MCNC: 9.4 MG/DL
CHLORIDE SERPL-SCNC: 108 MMOL/L
CO2 SERPL-SCNC: 24 MMOL/L
CREAT SERPL-MCNC: 1.45 MG/DL
CREAT SPEC-SCNC: 20 MG/DL
CREAT/PROT UR: NORMAL RATIO
EGFR: 50 ML/MIN/1.73M2
EOSINOPHIL # BLD AUTO: 0.02 K/UL
EOSINOPHIL NFR BLD AUTO: 0.4 %
GLUCOSE SERPL-MCNC: 76 MG/DL
HCT VFR BLD CALC: 28.8 %
HGB BLD-MCNC: 9.4 G/DL
IMM GRANULOCYTES NFR BLD AUTO: 1.1 %
LYMPHOCYTES # BLD AUTO: 0.12 K/UL
LYMPHOCYTES NFR BLD AUTO: 2.2 %
MAGNESIUM SERPL-MCNC: 1.6 MG/DL
MAN DIFF?: NORMAL
MCHC RBC-ENTMCNC: 32.1 PG
MCHC RBC-ENTMCNC: 32.6 GM/DL
MCV RBC AUTO: 98.3 FL
MONOCYTES # BLD AUTO: 0.19 K/UL
MONOCYTES NFR BLD AUTO: 3.6 %
NEUTROPHILS # BLD AUTO: 4.92 K/UL
NEUTROPHILS NFR BLD AUTO: 92 %
PHOSPHATE SERPL-MCNC: 3.9 MG/DL
PLATELET # BLD AUTO: 193 K/UL
POTASSIUM SERPL-SCNC: 5.3 MMOL/L
PROT SERPL-MCNC: 6.3 G/DL
PROT UR-MCNC: <4 MG/DL
RBC # BLD: 2.93 M/UL
RBC # FLD: 16.8 %
SODIUM SERPL-SCNC: 143 MMOL/L
TACROLIMUS SERPL-MCNC: <2 NG/ML
WBC # FLD AUTO: 5.35 K/UL

## 2024-05-07 PROCEDURE — 99215 OFFICE O/P EST HI 40 MIN: CPT

## 2024-05-07 RX ORDER — PREDNISONE 5 MG/1
5 TABLET ORAL DAILY
Qty: 180 | Refills: 3 | Status: ACTIVE | COMMUNITY
Start: 2024-04-19 | End: 1900-01-01

## 2024-05-07 RX ORDER — DULAGLUTIDE 3 MG/.5ML
3 INJECTION, SOLUTION SUBCUTANEOUS
Qty: 2 | Refills: 11 | Status: DISCONTINUED | COMMUNITY
Start: 2024-04-22 | End: 2024-05-07

## 2024-05-07 RX ORDER — NIFEDIPINE 30 MG/1
30 TABLET, EXTENDED RELEASE ORAL DAILY
Qty: 90 | Refills: 3 | Status: ACTIVE | COMMUNITY
Start: 2024-04-22 | End: 1900-01-01

## 2024-05-07 NOTE — HISTORY OF PRESENT ILLNESS
[FreeTextEntry1] : 79 years old male, born in [place], [place], living in [place] since . Today accompanied by his wife of 54 years Leatha. He received DDRT on 24. He was on dialysis for 6 years followed by Dr. Ross. 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 Reviewed hospital records. He reports he is feeling well. Noted right LE duplex neg for DVT done for edema.  He is on De susan Belatacept after Thymoglobulin induction. Reports no coronary stents.  He lives with wife, only son lives in [place] , an .   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 Received  donor kidney transplant from a 48 year old, Hep C +ve donor, KDPI 87%. HCV PCR pending, follow up outpatient. Smooth post op course, doing well, creatinine 1.84 on discharge, good UOP.   He received final dose of Thymoglobulin. He was discharged home on Belatacept, MMF 1g BID, oral Prednisone taper and prophylactic agents Bactrim, Nystatin and Valcyte 450mg daily.   [] DDRT w/ thymo completed - Denovo/Nicole Belatacept dosing Day 1 (24): 750 mg Day 5 (2024): 750 mg Day 15 (2024): 750 mg Day 29 (2024): 750 mg Day 57 (2024): 750 mg Day 85 (2024): 750 mg Day 113 (8/15/2024): 375 mg 375 mg every 4 weeks after - Thom removed   [] Donor HCV JUDIE +           Current Medications	  allopurinol 100 mg oral tablet: 1 tab(s) orally once a day aspirin 81 mg oral tablet: 1 tab(s) orally once a day atorvastatin 40 mg oral tablet: 1 tab(s) orally once a day (at bedtime) belatacept 250 mg intravenous injection: 750 milligram(s) intravenous once Follow instructions given by transplant team Coreg 12.5 mg oral tablet: 1 tab(s) orally 2 times a day escitalopram 10 mg oral tablet: 1 tab(s) orally once a day famotidine 20 mg oral tablet: 1 tab(s) orally once a day mycophenolate mofetil 250 mg oral capsule: 1,000 milligram(s) orally every 12 hours NIFEdipine 30 mg oral tablet, extended release: 2 tab(s) orally once a day NovoLOG FlexTouch 100 units/mL subcutaneous solution: 6-10 unit(s) subcutaneous 3 times a day (before meals) nystatin 100,000 units/mL oral suspension: 5 milliliter(s) orally 4 times a day predniSONE: 10 milligram(s) orally once a day please follow taper given by transplant team senna leaf extract oral tablet: 2 tab(s) orally once a day (at bedtime) sulfamethoxazole-trimethoprim 400 mg-80 mg oral tablet: 1 tab(s) orally once a day tamsulosin 0.4 mg oral capsule: 1 cap(s) orally once a day Tresiba FlexTouch 100 units/mL subcutaneous solution: 4 U increased to 6 units/nigh today subcutaneous once a day (at bedtime) valGANciclovir 450 mg oral tablet: 1 tab(s) orally once a day Furosemide 20 mg/d increased to 40 mg/d Sod bicarb 650 2 tab BID

## 2024-05-07 NOTE — PHYSICAL EXAM
[General Appearance - Alert] : alert [General Appearance - In No Acute Distress] : in no acute distress [Sclera] : the sclera and conjunctiva were normal [Jugular Venous Distention Increased] : there was no jugular-venous distention [Auscultation Breath Sounds / Voice Sounds] : lungs were clear to auscultation bilaterally [Heart Sounds Gallop] : no gallops [Heart Sounds Pericardial Friction Rub] : no pericardial rub [FreeTextEntry1] : right lower quadrant healing incision, staples+ [Cervical Lymph Nodes Enlarged Posterior Bilaterally] : posterior cervical [Cervical Lymph Nodes Enlarged Anterior Bilaterally] : anterior cervical [Involuntary Movements] : no involuntary movements were seen [___ (cm) Fistula] : [unfilled] (cm) fistula [] : no rash [No Focal Deficits] : no focal deficits [Oriented To Time, Place, And Person] : oriented to person, place, and time [Impaired Insight] : insight and judgment were intact

## 2024-05-07 NOTE — ASSESSMENT
[FreeTextEntry1] : Renal Transplant recipient: Noted allograft function, creatinine at discharge, wendy creatinine. Reviewed for urinary symptoms/fever/chills/pain/new symptoms. Tolerating medications. Most recent creatinine 1.3 Lab data from last visit reviewed including allograft function, urinalysis, any viremia and trough level of medication as well as any imaging reports. Immunosuppression: reviewed; Noted induction regimen, maintenance regimen and reviewed target trough level. Thymo induction, Belataceot MMF and prednisone. Full dose MMF and 10 mg/d prednisone DM: Current regimen reviewed. Optimal target glucose levels reviewed for fasting and post prandial measurements. On Tresciba 4 increased to 6 today Novolg 6-10 u premeal Will continue to monitor and adjust treatment; reviewed lifestyle modifications for glycemia control. Has flow sheets to maintain home charts of glucose , blood pressure, temperature, weight and urine output. Hypertension: controlled; Reviewed medications.  Reviewed target for blood pressure control. Hyperlipidemia: Reviewed medication regimen/lifestyle modification for maintaining target lipid levels. Cardiovascular risk reduction, primary/secondary prevention measures were discussed as appropriate.   Prophylaxis: Reviewed antimicrobial and GI prophylaxis as well as precautions to prevent infections. Discussed ambulation, using incentive spirometer, optimal glucose and blood pressure readings, adherence with medications and follow ups, follow up clinic visit schedule, avoiding dehydration, mosquito bites; prevention of DVT as well as food safety. Patient has met with transplant surgeon post transplant; post op wound care ureteral stent removal and follow up care were discussed. Advised to bring flow sheets and medication list at every visit. Discussed ophthalmology and dermatology checks at least yearly and vaccinations. Flu vaccine yearly and Pneumonia vaccine every 5 years. Copy of office visit and lab reports are being sent to primary physician and referring nephrologist.  Plabn: Increase lasix to 40/d If swelling persists will do ultrasound of allograft kidney

## 2024-05-08 LAB
APPEARANCE: CLEAR
BACTERIA: NEGATIVE /HPF
BILIRUBIN URINE: NEGATIVE
BLOOD URINE: ABNORMAL
CAST: 0 /LPF
COLOR: YELLOW
EPITHELIAL CELLS: 0 /HPF
GLUCOSE QUALITATIVE U: NEGATIVE MG/DL
KETONES URINE: NEGATIVE MG/DL
LEUKOCYTE ESTERASE URINE: ABNORMAL
MICROSCOPIC-UA: NORMAL
NITRITE URINE: NEGATIVE
PH URINE: 6.5
PROTEIN URINE: NEGATIVE MG/DL
RED BLOOD CELLS URINE: 3 /HPF
REVIEW: NORMAL
SPECIFIC GRAVITY URINE: 1.01
UROBILINOGEN URINE: 0.2 MG/DL
WHITE BLOOD CELLS URINE: 1 /HPF

## 2024-05-13 ENCOUNTER — APPOINTMENT (OUTPATIENT)
Dept: TRANSPLANT | Facility: CLINIC | Age: 79
End: 2024-05-13
Payer: MEDICARE

## 2024-05-13 VITALS
BODY MASS INDEX: 28.28 KG/M2 | HEART RATE: 73 BPM | DIASTOLIC BLOOD PRESSURE: 70 MMHG | SYSTOLIC BLOOD PRESSURE: 136 MMHG | HEIGHT: 66 IN | WEIGHT: 176 LBS | OXYGEN SATURATION: 98 % | TEMPERATURE: 97.8 F

## 2024-05-13 PROCEDURE — 99214 OFFICE O/P EST MOD 30 MIN: CPT | Mod: 24

## 2024-05-13 RX ORDER — FUROSEMIDE 20 MG/1
20 TABLET ORAL TWICE DAILY
Qty: 60 | Refills: 6 | Status: ACTIVE | COMMUNITY
Start: 2024-04-29 | End: 1900-01-01

## 2024-05-13 NOTE — HISTORY OF PRESENT ILLNESS
[FreeTextEntry4] : Transplant Type:  donor   Date of Surgery: 2024   Induction Agent(s): thymoglobulin 79 year old male with DM, HTN and ESRD on HD since 2018 received DDRT on 2024 did well with no post op complications discharged home with creatinine 1.84 immunosuppression belatacept, mmf and steroids   Interval Events: today doing well blood pressure improved poor blood sugar control with evening blood sugars up to 3-400, self-administering sliding scale eating and ambulating right leg swelling remains - US no DVT

## 2024-05-13 NOTE — ASSESSMENT
[FreeTextEntry1] : post DDRT on April 17th doing well immunosuppression Belatacept, MMF and prednisone 10mg; no tacro used due to donor biopsy with some vascular sclerosis blood pressure  controlled with nifedipine to 60mg daily right leg edema - continued lasix, no dvt on US BSL high blood sugars, increased tresiba 8>12, continue 10u premeals and sliding scale, endo r/v on epclusa for HCV check labs follow up in one week.

## 2024-05-13 NOTE — PHYSICAL EXAM
[Normal] : normal [TextBox_34] : right leg edema [Clean] : clean [Dry] : dry [Healing Well] : healing well [FreeTextEntry1] : staples removed

## 2024-05-14 ENCOUNTER — NON-APPOINTMENT (OUTPATIENT)
Age: 79
End: 2024-05-14

## 2024-05-14 ENCOUNTER — APPOINTMENT (OUTPATIENT)
Dept: TRANSPLANT | Facility: CLINIC | Age: 79
End: 2024-05-14

## 2024-05-14 ENCOUNTER — OUTPATIENT (OUTPATIENT)
Dept: OUTPATIENT SERVICES | Facility: HOSPITAL | Age: 79
LOS: 1 days | End: 2024-05-14

## 2024-05-23 ENCOUNTER — APPOINTMENT (OUTPATIENT)
Dept: NEPHROLOGY | Facility: CLINIC | Age: 79
End: 2024-05-23
Payer: MEDICARE

## 2024-05-23 VITALS
TEMPERATURE: 98.3 F | BODY MASS INDEX: 27.97 KG/M2 | SYSTOLIC BLOOD PRESSURE: 127 MMHG | HEIGHT: 66 IN | DIASTOLIC BLOOD PRESSURE: 67 MMHG | WEIGHT: 174 LBS | HEART RATE: 77 BPM | RESPIRATION RATE: 16 BRPM | OXYGEN SATURATION: 96 %

## 2024-05-23 PROCEDURE — 99214 OFFICE O/P EST MOD 30 MIN: CPT

## 2024-05-23 NOTE — HISTORY OF PRESENT ILLNESS
[FreeTextEntry1] : 79 years old male, born in [place], [place], living in  since . Today accompanied by his wife of 54 years Leatha. He received DDRT on 24. He was on dialysis for 6 years followed by Dr. Ross. 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 Reviewed hospital records. He reports he is feeling well. Noted right LE duplex neg for DVT done for edema.  He is on De susan Belatacept after Thymoglobulin induction. Reports no coronary stents.  He lives with wife, only son lives in [place] , an electrical engineer.    79 year old man with h/o longstanding Diabetes II on insulin and HTN, ESRD on HD since 2018. Received  donor kidney transplant from a 48 year old, Hep C +ve donor, KDPI 87%. HCV PCR pending, follow up outpatient. Smooth post op course, doing well, creatinine 1.84 on discharge, good UOP.   He received final dose of Thymoglobulin. He was discharged home on Belatacept, MMF 1g BID, oral Prednisone taper and prophylactic agents Bactrim, Nystatin and Valcyte 450mg daily.   [] DDRT w/ thymo completed - Denovo/Nicole Belatacept dosing Day 1 (24): 750 mg Day 5 (2024): 750 mg Day 15 (2024): 750 mg Day 29 (2024): 750 mg Day 57 (2024): 750 mg Day 85 (2024): 750 mg Day 113 (8/15/2024): 375 mg 375 mg every 4 weeks after - Thom removed   [] Donor HCV JUDIE +           Current Medications	  allopurinol 100 mg oral tablet: 1 tab(s) orally once a day aspirin 81 mg oral tablet: 1 tab(s) orally once a day atorvastatin 40 mg oral tablet: 1 tab(s) orally once a day (at bedtime) belatacept 250 mg intravenous injection: 750 milligram(s) intravenous once Follow instructions given by transplant team Coreg 12.5 mg oral tablet: 1 tab(s) orally 2 times a day escitalopram 10 mg oral tablet: 1 tab(s) orally once a day famotidine 20 mg oral tablet: 1 tab(s) orally once a day mycophenolate mofetil 250 mg oral capsule: 1,000 milligram(s) orally every 12 hours NIFEdipine 30 mg oral tablet, extended release: 2 tab(s) orally once a day NovoLOG FlexTouch 100 units/mL subcutaneous solution: 6-10 unit(s) subcutaneous 3 times a day (before meals) nystatin 100,000 units/mL oral suspension: 5 milliliter(s) orally 4 times a day predniSONE: 10 milligram(s) orally once a day please follow taper given by transplant team senna leaf extract oral tablet: 2 tab(s) orally once a day (at bedtime) sulfamethoxazole-trimethoprim 400 mg-80 mg oral tablet: 1 tab(s) orally once a day tamsulosin 0.4 mg oral capsule: 1 cap(s) orally once a day Tresiba FlexTouch 100 units/mL subcutaneous solution: 4 U increased to 6 units/nigh today subcutaneous once a day (at bedtime) valGANciclovir 450 mg oral tablet: 1 tab(s) orally once a day Furosemide   40 mg/d Sod bicarb 650 2 tab BID

## 2024-05-23 NOTE — PHYSICAL EXAM
[General Appearance - Alert] : alert [General Appearance - In No Acute Distress] : in no acute distress [Sclera] : the sclera and conjunctiva were normal [Outer Ear] : the ears and nose were normal in appearance [Both Tympanic Membranes Were Examined] : both tympanic membranes were normal [Jugular Venous Distention Increased] : there was no jugular-venous distention [Auscultation Breath Sounds / Voice Sounds] : lungs were clear to auscultation bilaterally [Heart Sounds Gallop] : no gallops [Heart Sounds Pericardial Friction Rub] : no pericardial rub [FreeTextEntry1] : right lower quadrant healing incision, staples+ [Cervical Lymph Nodes Enlarged Posterior Bilaterally] : posterior cervical [Cervical Lymph Nodes Enlarged Anterior Bilaterally] : anterior cervical [Involuntary Movements] : no involuntary movements were seen [___ (cm) Fistula] : [unfilled] (cm) fistula [] : no rash [No Focal Deficits] : no focal deficits [Oriented To Time, Place, And Person] : oriented to person, place, and time [Impaired Insight] : insight and judgment were intact

## 2024-05-23 NOTE — ASSESSMENT
[FreeTextEntry1] : Renal Transplant recipient: Noted allograft function, creatinine at discharge, wendy creatinine. Reviewed for urinary symptoms/fever/chills/pain/new symptoms. Tolerating medications. Most recent creatinine 1.4 Lab data from last visit reviewed including allograft function, urinalysis, any viremia and trough level of medication as well as any imaging reports. Immunosuppression: reviewed; Noted induction regimen, maintenance regimen and reviewed target trough level. Thymo induction, Belataceot MMF and prednisone. Full dose MMF and 10 mg/d prednisone DM: Current regimen reviewed. Optimal target glucose levels reviewed for fasting and post prandial measurements. On Tresciba 12-14 today Novolog 6-10 u premeal Will continue to monitor and adjust treatment; reviewed lifestyle modifications for glycemia control. Has flow sheets to maintain home charts of glucose , blood pressure, temperature, weight and urine output. Hypertension: controlled; Reviewed medications.  Reviewed target for blood pressure control. Hyperlipidemia: Reviewed medication regimen/lifestyle modification for maintaining target lipid levels. Cardiovascular risk reduction, primary/secondary prevention measures were discussed as appropriate.   Prophylaxis: Reviewed antimicrobial and GI prophylaxis as well as precautions to prevent infections. Discussed ambulation, using incentive spirometer, optimal glucose and blood pressure readings, adherence with medications and follow ups, follow up clinic visit schedule, avoiding dehydration, mosquito bites; prevention of DVT as well as food safety. Patient has met with transplant surgeon post transplant; post op wound care ureteral stent removal and follow up care were discussed. Advised to bring flow sheets and medication list at every visit. Discussed ophthalmology and dermatology checks at least yearly and vaccinations. Flu vaccine yearly and Pneumonia vaccine every 5 years. Copy of office visit and lab reports are being sent to primary physician and referring nephrologist.  Plan: Will start low dose Tacrolimus after review of labs

## 2024-05-24 LAB
ALBUMIN SERPL ELPH-MCNC: 4.5 G/DL
ALP BLD-CCNC: 117 U/L
ALT SERPL-CCNC: 24 U/L
ANION GAP SERPL CALC-SCNC: 16 MMOL/L
APPEARANCE: CLEAR
AST SERPL-CCNC: 20 U/L
BACTERIA: NEGATIVE /HPF
BILIRUB SERPL-MCNC: 0.5 MG/DL
BILIRUBIN URINE: NEGATIVE
BLOOD URINE: NEGATIVE
BUN SERPL-MCNC: 45 MG/DL
CALCIUM SERPL-MCNC: 9.6 MG/DL
CAST: 0 /LPF
CHLORIDE SERPL-SCNC: 104 MMOL/L
CO2 SERPL-SCNC: 21 MMOL/L
COLOR: YELLOW
CREAT SERPL-MCNC: 1.49 MG/DL
CREAT SPEC-SCNC: 70 MG/DL
CREAT/PROT UR: 0.1 RATIO
EGFR: 48 ML/MIN/1.73M2
EPITHELIAL CELLS: 0 /HPF
GLUCOSE QUALITATIVE U: NEGATIVE MG/DL
GLUCOSE SERPL-MCNC: 123 MG/DL
HCT VFR BLD CALC: 32.5 %
HGB BLD-MCNC: 10.3 G/DL
KETONES URINE: NEGATIVE MG/DL
LEUKOCYTE ESTERASE URINE: NEGATIVE
MAGNESIUM SERPL-MCNC: 1.9 MG/DL
MCHC RBC-ENTMCNC: 31.7 GM/DL
MCHC RBC-ENTMCNC: 32 PG
MCV RBC AUTO: 100.9 FL
MICROSCOPIC-UA: NORMAL
NITRITE URINE: NEGATIVE
PH URINE: 5.5
PHOSPHATE SERPL-MCNC: 3.9 MG/DL
PLATELET # BLD AUTO: 254 K/UL
POTASSIUM SERPL-SCNC: 5.4 MMOL/L
PROT SERPL-MCNC: 6.6 G/DL
PROT UR-MCNC: 7 MG/DL
PROTEIN URINE: NEGATIVE MG/DL
RBC # BLD: 3.22 M/UL
RBC # FLD: 16.8 %
RED BLOOD CELLS URINE: 0 /HPF
SODIUM SERPL-SCNC: 141 MMOL/L
SPECIFIC GRAVITY URINE: 1.02
TACROLIMUS SERPL-MCNC: <2 NG/ML
UROBILINOGEN URINE: 0.2 MG/DL
WBC # FLD AUTO: 6.67 K/UL
WHITE BLOOD CELLS URINE: 2 /HPF

## 2024-05-24 RX ORDER — INSULIN ASPART 100 [IU]/ML
100 INJECTION, SOLUTION INTRAVENOUS; SUBCUTANEOUS
Qty: 5 | Refills: 3 | Status: ACTIVE | COMMUNITY
Start: 2024-04-19

## 2024-05-29 ENCOUNTER — LABORATORY RESULT (OUTPATIENT)
Age: 79
End: 2024-05-29

## 2024-05-29 ENCOUNTER — APPOINTMENT (OUTPATIENT)
Dept: TRANSPLANT | Facility: CLINIC | Age: 79
End: 2024-05-29
Payer: MEDICARE

## 2024-05-29 VITALS
DIASTOLIC BLOOD PRESSURE: 81 MMHG | RESPIRATION RATE: 16 BRPM | TEMPERATURE: 97.6 F | SYSTOLIC BLOOD PRESSURE: 179 MMHG | OXYGEN SATURATION: 98 % | HEIGHT: 66 IN | WEIGHT: 174 LBS | BODY MASS INDEX: 27.97 KG/M2 | HEART RATE: 79 BPM

## 2024-05-29 LAB
ALBUMIN SERPL ELPH-MCNC: 4.6 G/DL
ALP BLD-CCNC: 112 U/L
ALT SERPL-CCNC: 22 U/L
ANION GAP SERPL CALC-SCNC: 12 MMOL/L
APPEARANCE: CLEAR
AST SERPL-CCNC: 18 U/L
BACTERIA: NEGATIVE /HPF
BILIRUB SERPL-MCNC: 0.5 MG/DL
BILIRUBIN URINE: NEGATIVE
BLOOD URINE: NEGATIVE
BUN SERPL-MCNC: 41 MG/DL
CALCIUM SERPL-MCNC: 10.4 MG/DL
CAST: 0 /LPF
CHLORIDE SERPL-SCNC: 104 MMOL/L
CO2 SERPL-SCNC: 24 MMOL/L
COLOR: YELLOW
CREAT SERPL-MCNC: 1.3 MG/DL
CREAT SPEC-SCNC: 60 MG/DL
CREAT/PROT UR: 0.1 RATIO
EGFR: 57 ML/MIN/1.73M2
EPITHELIAL CELLS: 0 /HPF
GLUCOSE QUALITATIVE U: NEGATIVE MG/DL
GLUCOSE SERPL-MCNC: 89 MG/DL
HBV SURFACE AG SER QL: NONREACTIVE
HCT VFR BLD CALC: 34.1 %
HCV AB SER QL: REACTIVE
HCV S/CO RATIO: 3.29 S/CO
HGB BLD-MCNC: 10.9 G/DL
HIV1+2 AB SPEC QL IA.RAPID: NONREACTIVE
KETONES URINE: NEGATIVE MG/DL
LEUKOCYTE ESTERASE URINE: NEGATIVE
MAGNESIUM SERPL-MCNC: 2 MG/DL
MCHC RBC-ENTMCNC: 31.7 PG
MCHC RBC-ENTMCNC: 32 GM/DL
MCV RBC AUTO: 99.1 FL
MICROSCOPIC-UA: NORMAL
NITRITE URINE: NEGATIVE
PH URINE: 6
PHOSPHATE SERPL-MCNC: 3.9 MG/DL
PLATELET # BLD AUTO: 208 K/UL
POTASSIUM SERPL-SCNC: 5 MMOL/L
PROT SERPL-MCNC: 6.7 G/DL
PROT UR-MCNC: 6 MG/DL
PROTEIN URINE: NEGATIVE MG/DL
RBC # BLD: 3.44 M/UL
RBC # FLD: 16.7 %
RED BLOOD CELLS URINE: 1 /HPF
SODIUM SERPL-SCNC: 140 MMOL/L
SPECIFIC GRAVITY URINE: 1.02
TACROLIMUS SERPL-MCNC: <2 NG/ML
UROBILINOGEN URINE: 0.2 MG/DL
WBC # FLD AUTO: 8.02 K/UL
WHITE BLOOD CELLS URINE: 2 /HPF

## 2024-05-29 PROCEDURE — 99215 OFFICE O/P EST HI 40 MIN: CPT | Mod: 24

## 2024-05-29 NOTE — REASON FOR VISIT
[Follow-Up] : a follow-up visit  [Spouse] : spouse [FreeTextEntry3] : DDRT [FreeTextEntry5] : 4/17/2024

## 2024-05-29 NOTE — PHYSICAL EXAM
[No Acute Distress] : no acute distress [Sclera Anicteric] : sclera anicteric [Breathing Comfortably on RA] : breathing comfortably on room air [Soft] : soft [Non-tender] : non-tender [Normal] : normal [FreeTextEntry1] : No oral thrush [TextBox_34] : No edema b/l LE [TextBox_86] : No inguinal lymphadenopathy  [Site: ___] : Site: [unfilled] [Clean] : clean [Dry] : dry [Healing Well] : healing well [Bleeding] : no active bleeding [Foul Odor] : no foul smell [Purulent Drainage] : no purulent drainage [Serosanguinous Drainage] : no serosanguinous drainage [Erythema] : not erythematous [Warm] : not warm [Tender] : not tender

## 2024-05-29 NOTE — PLAN
[FreeTextEntry1] : Immunosuppression - Cont Belatacept, Prednisone 10mg daily, Cellcept 1gm bid Prophylaxis with valcyte and bactrim HTN not adequately controlled. Cont nifedipine 60mg daily; Increase carvedilol to 25mg bid. DM better controlled with Trulicity, Tresiba and pre-meal novolog sliding scale. Patient follows with endocrinology.  Epclusa for HCV donor Patient to follow-up with nephrology in 2 weeks.   Will be scheduled for cystoscopy and removal of ureteral stent in next few weeks.

## 2024-05-29 NOTE — HISTORY OF PRESENT ILLNESS
[ Donor] :  donor [Thymoglobulin] : thymoglobulin [Steroids] : steroids [Positive/Positive] : Donor Positive/Recipient Positive [] : Yes [Hepatitis C] : hepatitis c [Terminal Creatinine: ____] : Terminal Creatinine: [unfilled] [KDPI: ____] : Kidney Donor Profile Index: [unfilled] [de-identified] : 79M with longstanding DM s/p DDRT 4/17/24 Patient has been doing well. Denies any fevers, abdominal pain, nausea, diarrhea, hematuria or dysuria.  Glucose labile, mostly 100-200 with some readings >300. Being managed by endocrinology, and started on trulicity with sliding scale insulin coverage. BP has been running higher, 140-170/60-90.  Last Cr 1.4 Currently on Belatacept, Pred 10 and MMF 1gm bid.

## 2024-05-30 LAB
HCV RNA SERPL NAA+PROBE-LOG IU: <1.08 LOGIU/ML
HCV RNA SERPL NAA+PROBE-LOG IU: <1.08 LOGIU/ML
HEPB DNA PCR INT: NOT DETECTED
HEPB DNA PCR LOG: NOT DETECTED LOGIU/ML
HEPC RNA INTERP: DETECTED
HEPC RNA INTERP: DETECTED
HIV1 RNA # SERPL NAA+PROBE: NORMAL
HIV1 RNA # SERPL NAA+PROBE: NORMAL COPIES/ML
VIRAL LOAD INTERP: NORMAL
VIRAL LOAD LOG: NORMAL LG COP/ML

## 2024-06-03 ENCOUNTER — APPOINTMENT (OUTPATIENT)
Dept: OPHTHALMOLOGY | Facility: CLINIC | Age: 79
End: 2024-06-03
Payer: MEDICARE

## 2024-06-03 ENCOUNTER — NON-APPOINTMENT (OUTPATIENT)
Age: 79
End: 2024-06-03

## 2024-06-03 PROCEDURE — 92134 CPTRZ OPH DX IMG PST SGM RTA: CPT

## 2024-06-03 PROCEDURE — 92004 COMPRE OPH EXAM NEW PT 1/>: CPT

## 2024-06-04 ENCOUNTER — APPOINTMENT (OUTPATIENT)
Dept: UROLOGY | Facility: CLINIC | Age: 79
End: 2024-06-04
Payer: MEDICARE

## 2024-06-04 VITALS
SYSTOLIC BLOOD PRESSURE: 142 MMHG | WEIGHT: 175 LBS | BODY MASS INDEX: 28.12 KG/M2 | OXYGEN SATURATION: 98 % | HEART RATE: 76 BPM | HEIGHT: 66 IN | DIASTOLIC BLOOD PRESSURE: 68 MMHG | TEMPERATURE: 97.2 F

## 2024-06-04 DIAGNOSIS — R39.9 UNSPECIFIED SYMPTOMS AND SIGNS INVOLVING THE GENITOURINARY SYSTEM: ICD-10-CM

## 2024-06-04 PROCEDURE — 81003 URINALYSIS AUTO W/O SCOPE: CPT | Mod: QW

## 2024-06-04 PROCEDURE — 99203 OFFICE O/P NEW LOW 30 MIN: CPT

## 2024-06-04 PROCEDURE — G2211 COMPLEX E/M VISIT ADD ON: CPT

## 2024-06-04 PROCEDURE — 51798 US URINE CAPACITY MEASURE: CPT

## 2024-06-04 RX ORDER — TAMSULOSIN HYDROCHLORIDE 0.4 MG/1
0.4 CAPSULE ORAL
Qty: 90 | Refills: 3 | Status: ACTIVE | COMMUNITY
Start: 2024-06-04 | End: 1900-01-01

## 2024-06-04 NOTE — ASSESSMENT
[FreeTextEntry1] :  Mr. Waters presents for initial evaluation of non-specific LUTS History notable for recent renal transplant Currently managed with tamsulosin w/o significant voiding complaints PVR: 50 shows he is emptying well PSA last year was WNL, no need to screen again at his age  Recommendations -continue tamsulosin 0.4 mg qd, script renewed -RTC 1 year  no

## 2024-06-04 NOTE — PHYSICAL EXAM
[General Appearance - Well Developed] : well developed [General Appearance - Well Nourished] : well nourished [] : no respiratory distress [Abdomen Soft] : soft [Abdomen Tenderness] : non-tender

## 2024-06-04 NOTE — HISTORY OF PRESENT ILLNESS
[FreeTextEntry1] : 79M presents for initial evaluation of BPH.  Referred by [facility] Transplant team   PMH significant for: CAD, CKD, kidney transplant 04/17, DM2, HTN, HLD. PSH significant for: appy, AV fistula, kidney transplant (4/17/24)  Significant meds: allopurinol, ASA, atorvastatin, carvedilol, escitalopram, famotidine, furosemide, mycophenolate, nifedipine,    Currently on tamsulosin 0.4 mg.  Intermittent FOS. Nocturia x 2. No hematuria , dysuria or flank pain No current frequency or urgency. No family hx of prostate cancer. Non smoker, quit 40 years ago. No issues with urine leak.   DTF: 10   Daily Fluid Total: 3 liters. Daily Caffeine Total: 1 cup Neurologic Hx, Vision or Balance changes: no Erections: no, stopped having 8 years ago.  Reports tried viagra and didn't work.  Importance of maintaining ejaculatory ability: -  PSA 02/2023 - 1.30  PVR = 50

## 2024-06-06 ENCOUNTER — APPOINTMENT (OUTPATIENT)
Dept: ULTRASOUND IMAGING | Facility: HOSPITAL | Age: 79
End: 2024-06-06
Payer: MEDICARE

## 2024-06-06 ENCOUNTER — NON-APPOINTMENT (OUTPATIENT)
Age: 79
End: 2024-06-06

## 2024-06-06 ENCOUNTER — OUTPATIENT (OUTPATIENT)
Dept: OUTPATIENT SERVICES | Facility: HOSPITAL | Age: 79
LOS: 1 days | End: 2024-06-06
Payer: COMMERCIAL

## 2024-06-06 DIAGNOSIS — Z90.49 ACQUIRED ABSENCE OF OTHER SPECIFIED PARTS OF DIGESTIVE TRACT: Chronic | ICD-10-CM

## 2024-06-06 DIAGNOSIS — Z94.0 KIDNEY TRANSPLANT STATUS: ICD-10-CM

## 2024-06-06 LAB — BACTERIA UR CULT: NORMAL

## 2024-06-06 PROCEDURE — 76776 US EXAM K TRANSPL W/DOPPLER: CPT | Mod: 26,RT

## 2024-06-06 PROCEDURE — 76776 US EXAM K TRANSPL W/DOPPLER: CPT

## 2024-06-12 ENCOUNTER — LABORATORY RESULT (OUTPATIENT)
Age: 79
End: 2024-06-12

## 2024-06-12 ENCOUNTER — APPOINTMENT (OUTPATIENT)
Dept: NEPHROLOGY | Facility: CLINIC | Age: 79
End: 2024-06-12
Payer: MEDICARE

## 2024-06-12 VITALS
HEART RATE: 79 BPM | DIASTOLIC BLOOD PRESSURE: 72 MMHG | SYSTOLIC BLOOD PRESSURE: 122 MMHG | OXYGEN SATURATION: 97 % | TEMPERATURE: 97.5 F | WEIGHT: 164 LBS | BODY MASS INDEX: 26.36 KG/M2 | HEIGHT: 66 IN

## 2024-06-12 DIAGNOSIS — E11.9 TYPE 2 DIABETES MELLITUS W/OUT COMPLICATIONS: ICD-10-CM

## 2024-06-12 PROCEDURE — 99214 OFFICE O/P EST MOD 30 MIN: CPT

## 2024-06-12 NOTE — ASSESSMENT
[FreeTextEntry1] : Renal Transplant recipient: Noted allograft function, creatinine at discharge, wendy creatinine. Reviewed for urinary symptoms/fever/chills/pain/new symptoms. Tolerating medications. Lab data from last visit reviewed including allograft function, urinalysis, any viremia and trough level of medication as well as any imaging reports. Immunosuppression: reviewed; Noted induction regimen, maintenance regimen and reviewed target trough level. Thymo induction, Belatacept MMF and prednisone. Full dose MMF and 10 mg/d prednisone. Will consider adding low dose Tacrolimus and reduce prednisone to 5 mg/d if labs are stable on follow up. DM: Current regimen reviewed. Optimal target glucose levels reviewed for fasting and post prandial measurements. On Tresciba 12-14 today Novolog 6-10 u premeal Will continue to monitor and adjust treatment; reviewed lifestyle modifications for glycemia control. Has flow sheets to maintain home charts of glucose , blood pressure, temperature, weight and urine output. Hypertension: controlled; Reviewed medications.  Reviewed target for blood pressure control. Hyperlipidemia: Reviewed medication regimen/lifestyle modification for maintaining target lipid levels. Cardiovascular risk reduction, primary/secondary prevention measures were discussed as appropriate.   Prophylaxis: Reviewed antimicrobial and GI prophylaxis as well as precautions to prevent infections. Patient has met with transplant surgeon post transplant;  ureteral stent removal and follow up care were discussed. Advised to bring flow sheets and medication list at every visit. Discussed ophthalmology and dermatology checks at least yearly and vaccinations. Flu vaccine yearly and Pneumonia vaccine every 5 years. Copy of office visit and lab reports are being sent to primary physician and referring nephrologist.  Plan: Will start low dose Tacrolimus and decreasing prednisone after review of labs. Needs appointment for ureter stent removal
Adequate: hears normal conversation without difficulty

## 2024-06-12 NOTE — HISTORY OF PRESENT ILLNESS
[FreeTextEntry1] : 79 years old male, born in [place], [place], living in  since . Today accompanied by his wife of 54 years Leatha. He received DDRT on 24. He was on dialysis for 6 years followed by Dr. Ross. 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 Reviewed hospital records. He reports he is feeling well. Noted right LE duplex neg for DVT done for edema.  He is on De susan Belatacept after Thymoglobulin induction. Reports no coronary stents.  He lives with wife, only son lives in [place] , an electrical engineer.    79 year old man with h/o longstanding Diabetes II on insulin and HTN, ESRD on HD since 2018. Received  donor kidney transplant from a 48 year old, Hep C +ve donor, KDPI 87%. HCV PCR pending, follow up outpatient. Smooth post op course, doing well, creatinine 1.84 on discharge, good UOP.  [] DDRT w/ thymo completed - Denovo/Nicole Belatacept dosing Day 1 (24): 750 mg Day 5 (2024): 750 mg Day 15 (2024): 750 mg Day 29 (2024): 750 mg Day 57 (2024): 750 mg Day 85 (2024): 750 mg Day 113 (8/15/2024): 375 mg 375 mg every 4 weeks after   Current Medications	  allopurinol 100 mg oral tablet: 1 tab(s) orally once a day aspirin 81 mg oral tablet: 1 tab(s) orally once a day atorvastatin 40 mg oral tablet: 1 tab(s) orally once a day (at bedtime) belatacept 250 mg intravenous injection: 750 milligram(s) intravenous once Follow instructions given by transplant team Coreg 25 mg oral tablet: 1 tab(s) orally 2 times a day escitalopram 10 mg oral tablet: 1 tab(s) orally once a day famotidine 20 mg oral tablet: 1 tab(s) orally once a day mycophenolate mofetil 250 mg oral capsule: 1,000 milligram(s) orally every 12 hours NIFEdipine 30 mg oral tablet, extended release: 2 tab(s) orally once a day NovoLOG FlexTouch 100 units/mL subcutaneous solution: 6-10 unit(s) subcutaneous 3 times a day (before meals) nystatin 100,000 units/mL oral suspension: 5 milliliter(s) orally 4 times a day predniSONE: 10 milligram(s) orally once a day please follow taper given by transplant team senna leaf extract oral tablet: 2 tab(s) orally once a day (at bedtime) sulfamethoxazole-trimethoprim 400 mg-80 mg oral tablet: 1 tab(s) orally once a day tamsulosin 0.4 mg oral capsule: 1 cap(s) orally once a day Tresiba FlexTouch 100 units/mL subcutaneous solution: 12 units/night subcutaneous once a day (at bedtime) valGANciclovir 450 mg oral tablet: 1 tab(s) orally once a day Furosemide   40 mg/d Sod bicarb 650 2 tab BID

## 2024-06-13 ENCOUNTER — NON-APPOINTMENT (OUTPATIENT)
Age: 79
End: 2024-06-13

## 2024-06-13 LAB
ALBUMIN SERPL ELPH-MCNC: 4.6 G/DL
ALP BLD-CCNC: 98 U/L
ALT SERPL-CCNC: 27 U/L
ANION GAP SERPL CALC-SCNC: 12 MMOL/L
APPEARANCE: CLEAR
AST SERPL-CCNC: 18 U/L
BACTERIA: NEGATIVE /HPF
BASOPHILS # BLD AUTO: 0.04 K/UL
BASOPHILS NFR BLD AUTO: 0.5 %
BILIRUB SERPL-MCNC: 0.5 MG/DL
BILIRUBIN URINE: NEGATIVE
BKV DNA SPEC QL NAA+PROBE: NOT DETECTED IU/ML
BLOOD URINE: NEGATIVE
BUN SERPL-MCNC: 30 MG/DL
CALCIUM SERPL-MCNC: 9.8 MG/DL
CAST: 1 /LPF
CHLORIDE SERPL-SCNC: 103 MMOL/L
CO2 SERPL-SCNC: 24 MMOL/L
COLOR: YELLOW
CREAT SERPL-MCNC: 1.08 MG/DL
CREAT SPEC-SCNC: 65 MG/DL
CREAT/PROT UR: 0.1 RATIO
EGFR: 71 ML/MIN/1.73M2
EOSINOPHIL # BLD AUTO: 0.02 K/UL
EOSINOPHIL NFR BLD AUTO: 0.2 %
EPITHELIAL CELLS: 0 /HPF
GLUCOSE QUALITATIVE U: NEGATIVE MG/DL
GLUCOSE SERPL-MCNC: 55 MG/DL
HCT VFR BLD CALC: 34.4 %
HGB BLD-MCNC: 11.5 G/DL
IMM GRANULOCYTES NFR BLD AUTO: 0.7 %
KETONES URINE: NEGATIVE MG/DL
LEUKOCYTE ESTERASE URINE: ABNORMAL
LYMPHOCYTES # BLD AUTO: 0.22 K/UL
LYMPHOCYTES NFR BLD AUTO: 2.7 %
MAGNESIUM SERPL-MCNC: 1.8 MG/DL
MAN DIFF?: NORMAL
MCHC RBC-ENTMCNC: 33 PG
MCHC RBC-ENTMCNC: 33.4 GM/DL
MCV RBC AUTO: 98.9 FL
MICROSCOPIC-UA: NORMAL
MONOCYTES # BLD AUTO: 0.37 K/UL
MONOCYTES NFR BLD AUTO: 4.5 %
NEUTROPHILS # BLD AUTO: 7.5 K/UL
NEUTROPHILS NFR BLD AUTO: 91.4 %
NITRITE URINE: NEGATIVE
PH URINE: 5.5
PHOSPHATE SERPL-MCNC: 4.3 MG/DL
PLATELET # BLD AUTO: 186 K/UL
POTASSIUM SERPL-SCNC: 4.8 MMOL/L
PROT SERPL-MCNC: 6.5 G/DL
PROT UR-MCNC: 8 MG/DL
PROTEIN URINE: NEGATIVE MG/DL
RBC # BLD: 3.48 M/UL
RBC # FLD: 15.7 %
RED BLOOD CELLS URINE: 1 /HPF
SODIUM SERPL-SCNC: 139 MMOL/L
SPECIFIC GRAVITY URINE: 1.02
UROBILINOGEN URINE: 0.2 MG/DL
WBC # FLD AUTO: 8.21 K/UL
WHITE BLOOD CELLS URINE: 2 /HPF

## 2024-06-18 ENCOUNTER — APPOINTMENT (OUTPATIENT)
Dept: TRANSPLANT | Facility: CLINIC | Age: 79
End: 2024-06-18

## 2024-06-18 PROCEDURE — ZZZZZ: CPT

## 2024-06-18 PROCEDURE — 52310 CYSTOSCOPY AND TREATMENT: CPT | Mod: 58

## 2024-06-18 NOTE — PHYSICAL EXAM
[Well Developed] : well developed [Normocephalic] : normocephalic [Atraumatic] : atraumatic [Clear to Auscultation] : clear to auscultation [Breathing Comfortably on RA] : breathing comfortably on room air [Normal Rate] : normal rate [Regular Rhythm] : regular rhythm [Soft] : soft [Non-tender] : non-tender [Site: ___] : Site: [unfilled] [FreeTextEntry1] : well healed

## 2024-06-18 NOTE — HISTORY OF PRESENT ILLNESS
[de-identified] : sp DDRTx.  De susan Nicole with Thymo induction.  Cr 1.08.  Presents for stent removal.  No recent sicknesses/illnesses.  PROCEDURE: Consent obtained after risks/benefits of cystocsopy and stent removal discusssed.  Perineum prepped and draped in the usual sterile fashion.  Well lubricated flexible cystoscope placed through the urethral meatus and into the bladder.  Stent easily found and grasped.  Stent and cystoscope removed.  Stent complete/not fractured.  Patient tolerated well.  Brought to waiting room in stable condition.

## 2024-06-25 ENCOUNTER — LABORATORY RESULT (OUTPATIENT)
Age: 79
End: 2024-06-25

## 2024-06-25 ENCOUNTER — APPOINTMENT (OUTPATIENT)
Dept: NEPHROLOGY | Facility: CLINIC | Age: 79
End: 2024-06-25
Payer: MEDICARE

## 2024-06-25 VITALS — DIASTOLIC BLOOD PRESSURE: 60 MMHG | RESPIRATION RATE: 16 BRPM | HEART RATE: 84 BPM | SYSTOLIC BLOOD PRESSURE: 130 MMHG

## 2024-06-25 VITALS — BODY MASS INDEX: 29.38 KG/M2 | WEIGHT: 182 LBS

## 2024-06-25 DIAGNOSIS — Z94.0 KIDNEY TRANSPLANT STATUS: ICD-10-CM

## 2024-06-25 DIAGNOSIS — Z94.0 OTHER LONG TERM (CURRENT) DRUG THERAPY: ICD-10-CM

## 2024-06-25 DIAGNOSIS — I10 ESSENTIAL (PRIMARY) HYPERTENSION: ICD-10-CM

## 2024-06-25 DIAGNOSIS — E11.29 TYPE 2 DIABETES MELLITUS WITH OTHER DIABETIC KIDNEY COMPLICATION: ICD-10-CM

## 2024-06-25 DIAGNOSIS — Z79.899 OTHER LONG TERM (CURRENT) DRUG THERAPY: ICD-10-CM

## 2024-06-25 PROCEDURE — 99214 OFFICE O/P EST MOD 30 MIN: CPT

## 2024-06-25 RX ORDER — DULAGLUTIDE 0.75 MG/.5ML
0.75 INJECTION, SOLUTION SUBCUTANEOUS
Qty: 3 | Refills: 3 | Status: ACTIVE | COMMUNITY
Start: 2024-06-25

## 2024-06-25 RX ORDER — TACROLIMUS 1 MG/1
1 TABLET, EXTENDED RELEASE ORAL
Qty: 90 | Refills: 3 | Status: ACTIVE | COMMUNITY
Start: 2024-06-25 | End: 1900-01-01

## 2024-06-25 RX ORDER — CARVEDILOL 25 MG/1
25 TABLET, FILM COATED ORAL TWICE DAILY
Qty: 180 | Refills: 3 | Status: ACTIVE | COMMUNITY
Start: 2024-04-22 | End: 1900-01-01

## 2024-06-26 ENCOUNTER — NON-APPOINTMENT (OUTPATIENT)
Age: 79
End: 2024-06-26

## 2024-06-26 LAB
ALBUMIN SERPL ELPH-MCNC: 4.7 G/DL
ALP BLD-CCNC: 126 U/L
ALT SERPL-CCNC: 27 U/L
ANION GAP SERPL CALC-SCNC: 11 MMOL/L
APPEARANCE: CLEAR
AST SERPL-CCNC: 24 U/L
BACTERIA: NEGATIVE /HPF
BASOPHILS # BLD AUTO: 0.06 K/UL
BASOPHILS NFR BLD AUTO: 0.9 %
BILIRUB SERPL-MCNC: 0.4 MG/DL
BILIRUBIN URINE: NEGATIVE
BLOOD URINE: NEGATIVE
BUN SERPL-MCNC: 40 MG/DL
CALCIUM SERPL-MCNC: 9.8 MG/DL
CAST: 0 /LPF
CHLORIDE SERPL-SCNC: 107 MMOL/L
CO2 SERPL-SCNC: 25 MMOL/L
COLOR: YELLOW
CREAT SERPL-MCNC: 1.28 MG/DL
CREAT SPEC-SCNC: 76 MG/DL
CREAT/PROT UR: 0.1 RATIO
EGFR: 58 ML/MIN/1.73M2
EOSINOPHIL # BLD AUTO: 0.01 K/UL
EOSINOPHIL NFR BLD AUTO: 0.1 %
EPITHELIAL CELLS: 0 /HPF
GLUCOSE QUALITATIVE U: NEGATIVE MG/DL
GLUCOSE SERPL-MCNC: 73 MG/DL
HCT VFR BLD CALC: 35.1 %
HGB BLD-MCNC: 11.6 G/DL
IMM GRANULOCYTES NFR BLD AUTO: 1.9 %
KETONES URINE: NEGATIVE MG/DL
LEUKOCYTE ESTERASE URINE: NEGATIVE
LYMPHOCYTES # BLD AUTO: 0.2 K/UL
LYMPHOCYTES NFR BLD AUTO: 2.9 %
MAGNESIUM SERPL-MCNC: 2.1 MG/DL
MAN DIFF?: NORMAL
MCHC RBC-ENTMCNC: 33 GM/DL
MCHC RBC-ENTMCNC: 33 PG
MCV RBC AUTO: 99.7 FL
MICROSCOPIC-UA: NORMAL
MONOCYTES # BLD AUTO: 0.37 K/UL
MONOCYTES NFR BLD AUTO: 5.4 %
NEUTROPHILS # BLD AUTO: 6.03 K/UL
NEUTROPHILS NFR BLD AUTO: 88.8 %
NITRITE URINE: NEGATIVE
PH URINE: 6.5
PHOSPHATE SERPL-MCNC: 3.5 MG/DL
PLATELET # BLD AUTO: 182 K/UL
POTASSIUM SERPL-SCNC: 5.1 MMOL/L
PROT SERPL-MCNC: 6.6 G/DL
PROT UR-MCNC: 7 MG/DL
PROTEIN URINE: NEGATIVE MG/DL
RBC # BLD: 3.52 M/UL
RBC # FLD: 14.9 %
RED BLOOD CELLS URINE: 1 /HPF
SODIUM SERPL-SCNC: 143 MMOL/L
SPECIFIC GRAVITY URINE: 1.02
TACROLIMUS SERPL-MCNC: <2 NG/ML
UROBILINOGEN URINE: 0.2 MG/DL
WBC # FLD AUTO: 6.8 K/UL
WHITE BLOOD CELLS URINE: 1 /HPF

## 2024-06-27 LAB — BKV DNA SPEC QL NAA+PROBE: NOT DETECTED IU/ML

## 2024-07-09 ENCOUNTER — APPOINTMENT (OUTPATIENT)
Dept: NEPHROLOGY | Facility: CLINIC | Age: 79
End: 2024-07-09
Payer: MEDICARE

## 2024-07-09 VITALS — SYSTOLIC BLOOD PRESSURE: 140 MMHG | DIASTOLIC BLOOD PRESSURE: 70 MMHG

## 2024-07-09 VITALS
RESPIRATION RATE: 16 BRPM | WEIGHT: 182 LBS | HEART RATE: 84 BPM | HEIGHT: 66 IN | SYSTOLIC BLOOD PRESSURE: 170 MMHG | BODY MASS INDEX: 29.25 KG/M2 | DIASTOLIC BLOOD PRESSURE: 82 MMHG | OXYGEN SATURATION: 97 % | TEMPERATURE: 98.3 F

## 2024-07-09 DIAGNOSIS — Z94.0 KIDNEY TRANSPLANT STATUS: ICD-10-CM

## 2024-07-09 DIAGNOSIS — E11.9 TYPE 2 DIABETES MELLITUS W/OUT COMPLICATIONS: ICD-10-CM

## 2024-07-09 DIAGNOSIS — Z79.899 OTHER LONG TERM (CURRENT) DRUG THERAPY: ICD-10-CM

## 2024-07-09 DIAGNOSIS — I10 ESSENTIAL (PRIMARY) HYPERTENSION: ICD-10-CM

## 2024-07-09 DIAGNOSIS — Z94.0 OTHER LONG TERM (CURRENT) DRUG THERAPY: ICD-10-CM

## 2024-07-09 PROCEDURE — 99214 OFFICE O/P EST MOD 30 MIN: CPT

## 2024-07-15 ENCOUNTER — APPOINTMENT (OUTPATIENT)
Dept: OPHTHALMOLOGY | Facility: CLINIC | Age: 79
End: 2024-07-15
Payer: MEDICARE

## 2024-07-15 ENCOUNTER — NON-APPOINTMENT (OUTPATIENT)
Age: 79
End: 2024-07-15

## 2024-07-15 PROCEDURE — 92014 COMPRE OPH EXAM EST PT 1/>: CPT

## 2024-07-15 PROCEDURE — 92250 FUNDUS PHOTOGRAPHY W/I&R: CPT

## 2024-07-30 ENCOUNTER — APPOINTMENT (OUTPATIENT)
Dept: NEPHROLOGY | Facility: CLINIC | Age: 79
End: 2024-07-30

## 2024-08-01 ENCOUNTER — APPOINTMENT (OUTPATIENT)
Dept: OPHTHALMOLOGY | Facility: CLINIC | Age: 79
End: 2024-08-01
Payer: MEDICARE

## 2024-08-01 ENCOUNTER — NON-APPOINTMENT (OUTPATIENT)
Age: 79
End: 2024-08-01

## 2024-08-01 PROCEDURE — 92134 CPTRZ OPH DX IMG PST SGM RTA: CPT

## 2024-08-01 PROCEDURE — 92012 INTRM OPH EXAM EST PATIENT: CPT

## 2024-08-01 PROCEDURE — 92136 OPHTHALMIC BIOMETRY: CPT

## 2024-08-05 ENCOUNTER — APPOINTMENT (OUTPATIENT)
Dept: NEPHROLOGY | Facility: CLINIC | Age: 79
End: 2024-08-05

## 2024-08-05 LAB
ALBUMIN SERPL ELPH-MCNC: 4.4 G/DL
ALP BLD-CCNC: 112 U/L
ALT SERPL-CCNC: 27 U/L
ANION GAP SERPL CALC-SCNC: 13 MMOL/L
APPEARANCE: CLEAR
AST SERPL-CCNC: 27 U/L
BACTERIA: NEGATIVE /HPF
BILIRUB SERPL-MCNC: 0.4 MG/DL
BILIRUBIN URINE: NEGATIVE
BLOOD URINE: NEGATIVE
BUN SERPL-MCNC: 29 MG/DL
CALCIUM SERPL-MCNC: 9.2 MG/DL
CAST: 0 /LPF
CHLORIDE SERPL-SCNC: 106 MMOL/L
CO2 SERPL-SCNC: 21 MMOL/L
COLOR: YELLOW
CREAT SERPL-MCNC: 1.33 MG/DL
CREAT SPEC-SCNC: 89 MG/DL
CREAT/PROT UR: 0.1 RATIO
EGFR: 55 ML/MIN/1.73M2
EPITHELIAL CELLS: 0 /HPF
GLUCOSE QUALITATIVE U: NEGATIVE MG/DL
GLUCOSE SERPL-MCNC: 121 MG/DL
HCT VFR BLD CALC: 39.1 %
HGB BLD-MCNC: 12.7 G/DL
KETONES URINE: NEGATIVE MG/DL
LDH SERPL-CCNC: 374 U/L
LEUKOCYTE ESTERASE URINE: ABNORMAL
MAGNESIUM SERPL-MCNC: 1.7 MG/DL
MCHC RBC-ENTMCNC: 32.5 GM/DL
MCHC RBC-ENTMCNC: 33.1 PG
MCV RBC AUTO: 101.8 FL
MICROSCOPIC-UA: NORMAL
NITRITE URINE: NEGATIVE
PH URINE: 6
PHOSPHATE SERPL-MCNC: 3.1 MG/DL
PLATELET # BLD AUTO: 163 K/UL
POTASSIUM SERPL-SCNC: 5 MMOL/L
PROT SERPL-MCNC: 6.3 G/DL
PROT UR-MCNC: 8 MG/DL
PROTEIN URINE: NEGATIVE MG/DL
RBC # BLD: 3.84 M/UL
RBC # FLD: 13.2 %
RED BLOOD CELLS URINE: 0 /HPF
SODIUM SERPL-SCNC: 140 MMOL/L
SPECIFIC GRAVITY URINE: 1.02
TACROLIMUS SERPL-MCNC: 10.1 NG/ML
URATE SERPL-MCNC: 6.6 MG/DL
UROBILINOGEN URINE: 0.2 MG/DL
WBC # FLD AUTO: 4.83 K/UL
WHITE BLOOD CELLS URINE: 0 /HPF

## 2024-08-05 PROCEDURE — 99215 OFFICE O/P EST HI 40 MIN: CPT

## 2024-08-05 NOTE — ASSESSMENT
[FreeTextEntry1] : Renal Transplant recipient: Noted allograft function, creatinine at discharge, wendy creatinine. Reviewed for urinary symptoms/fever/chills/pain/new symptoms. Tolerating medications. Lab data from last visit reviewed including allograft function, urinalysis, any viremia and trough level of medication as well as any imaging reports. HCV: On Epclusa, completed, PHS labs as per protocol Immunosuppression: reviewed; Noted induction regimen, maintenance regimen and reviewed target trough level. Thymo induction, Belatacept MMF and prednisone. Full dose MMF and 5 mg/d prednisone. On low dose Tacrolimus 1mg/d  DM: Current regimen reviewed. Optimal target glucose levels reviewed for fasting and post prandial measurements. On Tresciba 12-14 today Novolog 6-10 u premeal Will continue to monitor and adjust treatment; reviewed lifestyle modifications for glycemia control. Has flow sheets to maintain home charts of glucose , blood pressure, temperature, weight and urine output. Hypertension: controlled; Reviewed medications.  Reviewed target for blood pressure control. Hyperlipidemia: Reviewed medication regimen/lifestyle modification for maintaining target lipid levels. Cardiovascular risk reduction, primary/secondary prevention measures were discussed as appropriate.   Prophylaxis: Reviewed antimicrobial and GI prophylaxis as well as precautions to prevent infections. Patient has met with transplant surgeon post transplant;  ureteral stent removal and follow up care were discussed. Advised to bring flow sheets and medication list at every visit. Discussed ophthalmology and dermatology checks at least yearly and vaccinations. Flu vaccine yearly and Pneumonia vaccine every 5 years. Copy of office visit and lab reports are being sent to primary physician and referring nephrologist.   Plan: To Whomsoever This May Concern  Patient is medically optimized for the planned ophthalmology procedure  (Cataract surgery ) from renal transplant point of view. Clinically stable, blood pressure is controlled. Physical findings and Lab data from the last visit are enclosed. Patient to be monitored for blood pressure, urine output, electrolytes and creatinine if remains admitted perioperatively. Patient's immunosuppression to be continued with out interruption. Patient if remains NPO needs needs to have the immunosuppression given parenterally. Please contact renal team perioperatively for parenteral dosages if necessary.    Romain Luu MD Kidney Transplant Program [facility] Phone: [phone] Fax: [phone]

## 2024-08-05 NOTE — HISTORY OF PRESENT ILLNESS
[FreeTextEntry1] : 79 years old male, born in [place], [place], living in  since 1969. Today accompanied by his wife of 54 years Leatha. 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 He was on dialysis for 6 years followed by Dr. Ross. 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 Reviewed hospital records. He reports he is feeling well. Noted right LE duplex neg for DVT done for edema.  He is on De susan Belatacept after Thymoglobulin induction.   Day 85 (7/18/2024): 750 mg Day 113 (8/15/2024): 375 mg 375 mg every 4 weeks after   Current Medications	 Enavrsus 1 mg once daily allopurinol 100 mg oral tablet: 1 tab(s) orally once a day aspirin 81 mg oral tablet: 1 tab(s) orally once a day atorvastatin 40 mg oral tablet: 1 tab(s) orally once a day (at bedtime) belatacept 250 mg intravenous injection: 375 milligram(s) intravenous once every 4 weeks Coreg 25 mg oral tablet: 1 tab(s) orally 2 times a day escitalopram 10 mg oral tablet: 1 tab(s) orally once a day famotidine 20 mg oral tablet: 1 tab(s) orally once a day mycophenolate mofetil : 1,000 milligram(s) orally every 12 hours NIFEdipine 30 mg oral tablet, extended release: 2 tab(s) orally once a day NovoLOG FlexTouch 100 units/mL subcutaneous solution: 6-10 unit(s) subcutaneous 3 times a day (before meals) predniSONE: 5 milligram(s) orally once a day  Started on Envarsus 1 mg once daily. transplant team senna leaf extract oral tablet: 2 tab(s) orally once a day (at bedtime) sulfamethoxazole-trimethoprim 400 mg-80 mg oral tablet: 1 tab(s) orally once a day tamsulosin 0.4 mg oral capsule: 1 cap(s) orally once a day Tresiba FlexTouch 100 units/mL subcutaneous solution: 12 units/night subcutaneous once a day (at bedtime) valGANciclovir 450 mg oral tablet: 1 tab(s) orally once a day Furosemide   40 mg/d Sod bicarb 650 2 tab BID Lexapro 10 mg/d Completed Epclusa Furosemide 20 mg/d  He had stent removed on 6/18/24. Had hematuria intermittently for 2 days. Then resolved.

## 2024-08-07 LAB
BKV DNA SPEC QL NAA+PROBE: NOT DETECTED IU/ML
CMV DNA SPEC QL NAA+PROBE: NOT DETECTED IU/ML
CMVPCR LOG: NOT DETECTED LOG10IU/ML
HCV RNA SERPL NAA+PROBE-LOG IU: NOT DETECTED LOGIU/ML
HEPC RNA INTERP: NOT DETECTED

## 2024-08-16 ENCOUNTER — APPOINTMENT (OUTPATIENT)
Dept: OPHTHALMOLOGY | Facility: EYE CENTER | Age: 79
End: 2024-08-16
Payer: MEDICARE

## 2024-08-16 ENCOUNTER — NON-APPOINTMENT (OUTPATIENT)
Age: 79
End: 2024-08-16

## 2024-08-16 PROCEDURE — 66982 XCAPSL CTRC RMVL CPLX WO ECP: CPT | Mod: LT

## 2024-08-17 ENCOUNTER — NON-APPOINTMENT (OUTPATIENT)
Age: 79
End: 2024-08-17

## 2024-08-17 ENCOUNTER — APPOINTMENT (OUTPATIENT)
Dept: OPHTHALMOLOGY | Facility: CLINIC | Age: 79
End: 2024-08-17
Payer: MEDICARE

## 2024-08-17 PROCEDURE — 99024 POSTOP FOLLOW-UP VISIT: CPT

## 2024-08-22 ENCOUNTER — NON-APPOINTMENT (OUTPATIENT)
Age: 79
End: 2024-08-22

## 2024-08-22 ENCOUNTER — APPOINTMENT (OUTPATIENT)
Dept: OPHTHALMOLOGY | Facility: CLINIC | Age: 79
End: 2024-08-22
Payer: MEDICARE

## 2024-08-22 PROCEDURE — 99024 POSTOP FOLLOW-UP VISIT: CPT

## 2024-08-22 PROCEDURE — 92250 FUNDUS PHOTOGRAPHY W/I&R: CPT

## 2024-08-27 ENCOUNTER — APPOINTMENT (OUTPATIENT)
Dept: NEPHROLOGY | Facility: CLINIC | Age: 79
End: 2024-08-27
Payer: MEDICARE

## 2024-08-27 ENCOUNTER — LABORATORY RESULT (OUTPATIENT)
Age: 79
End: 2024-08-27

## 2024-08-27 VITALS
HEART RATE: 81 BPM | HEIGHT: 66 IN | DIASTOLIC BLOOD PRESSURE: 82 MMHG | TEMPERATURE: 97.2 F | OXYGEN SATURATION: 98 % | WEIGHT: 186 LBS | BODY MASS INDEX: 29.89 KG/M2 | SYSTOLIC BLOOD PRESSURE: 183 MMHG

## 2024-08-27 VITALS — SYSTOLIC BLOOD PRESSURE: 140 MMHG | DIASTOLIC BLOOD PRESSURE: 70 MMHG

## 2024-08-27 DIAGNOSIS — Z94.0 KIDNEY TRANSPLANT STATUS: ICD-10-CM

## 2024-08-27 DIAGNOSIS — E11.9 TYPE 2 DIABETES MELLITUS W/OUT COMPLICATIONS: ICD-10-CM

## 2024-08-27 DIAGNOSIS — Z79.899 OTHER LONG TERM (CURRENT) DRUG THERAPY: ICD-10-CM

## 2024-08-27 DIAGNOSIS — L03.119 CELLULITIS OF UNSPECIFIED PART OF LIMB: ICD-10-CM

## 2024-08-27 DIAGNOSIS — Z94.0 OTHER LONG TERM (CURRENT) DRUG THERAPY: ICD-10-CM

## 2024-08-27 PROCEDURE — 99215 OFFICE O/P EST HI 40 MIN: CPT

## 2024-08-27 RX ORDER — CEPHALEXIN 500 MG/1
500 CAPSULE ORAL
Qty: 21 | Refills: 1 | Status: ACTIVE | COMMUNITY
Start: 2024-08-27 | End: 1900-01-01

## 2024-08-27 NOTE — ASSESSMENT
[FreeTextEntry1] : Renal Transplant recipient: Noted allograft function, creatinine at discharge, wendy creatinine. Reviewed for urinary symptoms/fever/chills/pain/new symptoms. Tolerating medications. Lab data from last visit reviewed including allograft function, urinalysis, any viremia and trough level of medication as well as any imaging reports. HCV:  Epclusa, completed, PHS labs as per protocol, being done today Immunosuppression: reviewed; Noted induction regimen, maintenance regimen and reviewed target trough level. Thymo induction, Belatacept MMF and prednisone. Full dose MMF and 5 mg/d prednisone. On low dose Tacrolimus 1mg/d  DM: Current regimen reviewed. Optimal target glucose levels reviewed for fasting and post prandial measurements. On Tresciba and Novolog   premeal Will continue to monitor and adjust treatment; reviewed lifestyle modifications for glycemia control. Has flow sheets to maintain home charts of glucose , blood pressure, temperature, weight and urine output. Hypertension: controlled; Reviewed medications.  Reviewed target for blood pressure control. Hyperlipidemia: Reviewed medication regimen/lifestyle modification for maintaining target lipid levels. Cardiovascular risk reduction, primary/secondary prevention measures were discussed as appropriate.   Prophylaxis: Reviewed antimicrobial and GI prophylaxis as well as precautions to prevent infections. Patient has met with transplant surgeon post transplant;  ureteral stent removal and follow up care were discussed. Advised to bring flow sheets and medication list at every visit. Discussed ophthalmology and dermatology checks at least yearly and vaccinations. Flu vaccine yearly and Pneumonia vaccine every 5 years. Copy of office visit and lab reports are being sent to primary physician and referring nephrologist.   Follow up every 4 weeks.

## 2024-08-27 NOTE — PHYSICAL EXAM
[General Appearance - Alert] : alert [General Appearance - In No Acute Distress] : in no acute distress [Sclera] : the sclera and conjunctiva were normal [Outer Ear] : the ears and nose were normal in appearance [Both Tympanic Membranes Were Examined] : both tympanic membranes were normal [Jugular Venous Distention Increased] : there was no jugular-venous distention [Auscultation Breath Sounds / Voice Sounds] : lungs were clear to auscultation bilaterally [Heart Sounds Gallop] : no gallops [Heart Sounds Pericardial Friction Rub] : no pericardial rub [Cervical Lymph Nodes Enlarged Posterior Bilaterally] : posterior cervical [Cervical Lymph Nodes Enlarged Anterior Bilaterally] : anterior cervical [Involuntary Movements] : no involuntary movements were seen [___ (cm) Fistula] : [unfilled] (cm) fistula [] : no rash [FreeTextEntry1] : left leg lateral aspect erythema around laceration [No Focal Deficits] : no focal deficits [Oriented To Time, Place, And Person] : oriented to person, place, and time [Impaired Insight] : insight and judgment were intact

## 2024-08-27 NOTE — HISTORY OF PRESENT ILLNESS
[FreeTextEntry1] : 79 years old male, born in [place], [place], living in  since 1969. Today accompanied by his wife of 54 years Leatha. 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 He was on dialysis for 6 years followed by Dr. Ross. 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 Reviewed hospital records. He reports he is feeling well. Noted right LE duplex neg for DVT done for edema.  He is on De susan Belatacept after Thymoglobulin induction. He had left eye cataract surgery, has right eye planned for October.     Current Medications	 Enavrsus 1 mg once daily allopurinol 100 mg oral tablet: 1 tab(s) orally once a day aspirin 81 mg oral tablet: 1 tab(s) orally once a day atorvastatin 40 mg oral tablet: 1 tab(s) orally once a day (at bedtime) belatacept 250 mg intravenous injection: 375 milligram(s) intravenous once every 4 weeks Coreg 25 mg oral tablet: 1 tab(s) orally 2 times a day escitalopram 10 mg oral tablet: 1 tab(s) orally once a day famotidine 20 mg oral tablet: 1 tab(s) orally once a day mycophenolate mofetil : 1,000 milligram(s) orally every 12 hours NIFEdipine 30 mg oral tablet, extended release: 2 tab(s) orally once a day NovoLOG FlexTouch 100 units/mL subcutaneous solution: 6-10 unit(s) subcutaneous 3 times a day (before meals) predniSONE: 5 milligram(s) orally once a day  Started on Envarsus 1 mg once daily. transplant team senna leaf extract oral tablet: 2 tab(s) orally once a day (at bedtime) sulfamethoxazole-trimethoprim 400 mg-80 mg oral tablet: 1 tab(s) orally once a day tamsulosin 0.4 mg oral capsule: 1 cap(s) orally once a day Tresiba FlexTouch 100 units/mL subcutaneous solution: 12 units/night subcutaneous once a day (at bedtime) valGANciclovir 450 mg oral tablet: 1 tab(s) orally once a day Furosemide   40 mg/d Sod bicarb 650 1 tab BID Lexapro 10 mg/d Completed Epclusa Furosemide 20 mg/d  He had stent removed on 6/18/24.   Sustained laceration on left leg, has erythema around it.  Their son who lives in [place] has COVID but mild illness.

## 2024-08-28 ENCOUNTER — NON-APPOINTMENT (OUTPATIENT)
Age: 79
End: 2024-08-28

## 2024-08-28 LAB
ALBUMIN SERPL ELPH-MCNC: 4.7 G/DL
ALP BLD-CCNC: 107 U/L
ALT SERPL-CCNC: 25 U/L
ANION GAP SERPL CALC-SCNC: 11 MMOL/L
APPEARANCE: CLEAR
AST SERPL-CCNC: 24 U/L
BACTERIA: NEGATIVE /HPF
BASOPHILS # BLD AUTO: 0.04 K/UL
BASOPHILS NFR BLD AUTO: 0.8 %
BILIRUB SERPL-MCNC: 0.6 MG/DL
BILIRUBIN URINE: NEGATIVE
BLOOD URINE: NEGATIVE
BUN SERPL-MCNC: 23 MG/DL
CALCIUM SERPL-MCNC: 9.9 MG/DL
CAST: 0 /LPF
CHLORIDE SERPL-SCNC: 106 MMOL/L
CMV DNA SPEC QL NAA+PROBE: NOT DETECTED IU/ML
CMVPCR LOG: NOT DETECTED LOG10IU/ML
CO2 SERPL-SCNC: 24 MMOL/L
COLOR: YELLOW
CREAT SERPL-MCNC: 1.12 MG/DL
CREAT SPEC-SCNC: 102 MG/DL
CREAT/PROT UR: 0.1 RATIO
EGFR: 68 ML/MIN/1.73M2
EOSINOPHIL # BLD AUTO: 0.05 K/UL
EOSINOPHIL NFR BLD AUTO: 1 %
EPITHELIAL CELLS: 0 /HPF
GLUCOSE QUALITATIVE U: NEGATIVE MG/DL
GLUCOSE SERPL-MCNC: 83 MG/DL
HCT VFR BLD CALC: 39.7 %
HCV RNA SERPL NAA+PROBE-LOG IU: NOT DETECTED LOGIU/ML
HEPC RNA INTERP: NOT DETECTED
HGB BLD-MCNC: 12.9 G/DL
IMM GRANULOCYTES NFR BLD AUTO: 1.8 %
KETONES URINE: NEGATIVE MG/DL
LEUKOCYTE ESTERASE URINE: NEGATIVE
LYMPHOCYTES # BLD AUTO: 0.25 K/UL
LYMPHOCYTES NFR BLD AUTO: 5.1 %
MAGNESIUM SERPL-MCNC: 1.7 MG/DL
MAN DIFF?: NORMAL
MCHC RBC-ENTMCNC: 31.6 PG
MCHC RBC-ENTMCNC: 32.5 GM/DL
MCV RBC AUTO: 97.3 FL
MICROSCOPIC-UA: NORMAL
MONOCYTES # BLD AUTO: 0.38 K/UL
MONOCYTES NFR BLD AUTO: 7.7 %
NEUTROPHILS # BLD AUTO: 4.1 K/UL
NEUTROPHILS NFR BLD AUTO: 83.6 %
NITRITE URINE: NEGATIVE
PH URINE: 6
PHOSPHATE SERPL-MCNC: 3.2 MG/DL
PLATELET # BLD AUTO: 162 K/UL
POTASSIUM SERPL-SCNC: 4.4 MMOL/L
PROT SERPL-MCNC: 6.6 G/DL
PROT UR-MCNC: 10 MG/DL
PROTEIN URINE: NEGATIVE MG/DL
RBC # BLD: 4.08 M/UL
RBC # FLD: 13.1 %
RED BLOOD CELLS URINE: 1 /HPF
SODIUM SERPL-SCNC: 140 MMOL/L
SPECIFIC GRAVITY URINE: 1.02
TACROLIMUS SERPL-MCNC: 2.9 NG/ML
URATE SERPL-MCNC: 6.5 MG/DL
UROBILINOGEN URINE: 0.2 MG/DL
WBC # FLD AUTO: 4.91 K/UL
WHITE BLOOD CELLS URINE: 0 /HPF

## 2024-08-29 LAB — BKV DNA SPEC QL NAA+PROBE: NOT DETECTED IU/ML

## 2024-09-10 RX ORDER — BELATACEPT 250 MG/1
250 INJECTION, POWDER, LYOPHILIZED, FOR SOLUTION INTRAVENOUS
Qty: 4 | Refills: 11 | Status: ACTIVE | COMMUNITY
Start: 2024-09-10 | End: 1900-01-01

## 2024-09-18 ENCOUNTER — APPOINTMENT (OUTPATIENT)
Dept: NEPHROLOGY | Facility: CLINIC | Age: 79
End: 2024-09-18
Payer: MEDICARE

## 2024-09-18 VITALS
WEIGHT: 190 LBS | BODY MASS INDEX: 30.53 KG/M2 | HEIGHT: 66 IN | SYSTOLIC BLOOD PRESSURE: 137 MMHG | RESPIRATION RATE: 16 BRPM | HEART RATE: 85 BPM | DIASTOLIC BLOOD PRESSURE: 75 MMHG | OXYGEN SATURATION: 95 %

## 2024-09-18 DIAGNOSIS — E11.29 TYPE 2 DIABETES MELLITUS WITH OTHER DIABETIC KIDNEY COMPLICATION: ICD-10-CM

## 2024-09-18 DIAGNOSIS — Z94.0 OTHER LONG TERM (CURRENT) DRUG THERAPY: ICD-10-CM

## 2024-09-18 DIAGNOSIS — Z79.899 OTHER LONG TERM (CURRENT) DRUG THERAPY: ICD-10-CM

## 2024-09-18 DIAGNOSIS — I10 ESSENTIAL (PRIMARY) HYPERTENSION: ICD-10-CM

## 2024-09-18 DIAGNOSIS — Z94.0 KIDNEY TRANSPLANT STATUS: ICD-10-CM

## 2024-09-18 PROCEDURE — 99215 OFFICE O/P EST HI 40 MIN: CPT

## 2024-09-18 RX ORDER — BLOOD SUGAR DIAGNOSTIC
STRIP MISCELLANEOUS
Qty: 300 | Refills: 3 | Status: ACTIVE | COMMUNITY
Start: 2024-09-18 | End: 1900-01-01

## 2024-09-18 RX ORDER — HYDRALAZINE HYDROCHLORIDE 25 MG/1
25 TABLET ORAL
Qty: 180 | Refills: 0 | Status: ACTIVE | COMMUNITY
Start: 2024-09-18 | End: 1900-01-01

## 2024-09-18 NOTE — HISTORY OF PRESENT ILLNESS
[FreeTextEntry1] : 79 years old male, born in [place], [place], living in  since 1969. Today accompanied by his wife of 54 years Leatha. 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 He was on dialysis for 6 years followed by Dr. Ross. 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 Reviewed hospital records. He reports he is feeling well. Noted right LE duplex neg for DVT done for edema.  Has persistent LE edema, will switch from Nifedipine He is on De susan Belatacept after Thymoglobulin induction. He had left eye cataract surgery, has right eye planned for October.     Current Medications	 Enavrsus 1 mg once daily allopurinol 100 mg oral tablet: 1 tab(s) orally once a day aspirin 81 mg oral tablet: 1 tab(s) orally once a day atorvastatin 40 mg oral tablet: 1 tab(s) orally once a day (at bedtime) belatacept 250 mg intravenous injection: 375 milligram(s) intravenous once every 4 weeks Coreg 25 mg oral tablet: 1 tab(s) orally 2 times a day escitalopram 10 mg oral tablet: 1 tab(s) orally once a day famotidine 20 mg oral tablet: 1 tab(s) orally once a day mycophenolate mofetil : 1,000 milligram(s) orally every 12 hours NIFEdipine 30 mg oral tablet, extended release: 2 tab(s) orally once a day- d/c, started hydralazine 25 mg BID NovoLOG FlexTouch 100 units/mL subcutaneous solution: 6-10 unit(s) subcutaneous 3 times a day (before meals) predniSONE: 5 milligram(s) orally once a day  Started on Envarsus 1 mg once daily. transplant team senna leaf extract oral tablet: 2 tab(s) orally once a day (at bedtime) sulfamethoxazole-trimethoprim 400 mg-80 mg oral tablet: 1 tab(s) orally once a day tamsulosin 0.4 mg oral capsule: 1 cap(s) orally once a day Tresiba FlexTouch 100 units/mL subcutaneous solution: 12 units/night subcutaneous once a day (at bedtime) valGANciclovir 450 mg oral tablet: 1 tab(s) orally once a day Furosemide   40 mg/d Sod bicarb 650 1 tab BID Lexapro 10 mg/d Completed Epclusa Furosemide 20 mg/d  He had stent removed on 6/18/24.   Sustained laceration on left leg, has erythema around it.  Their son who lives in [place] has COVID but mild illness.

## 2024-09-18 NOTE — ASSESSMENT
[FreeTextEntry1] : Renal Transplant recipient: Noted allograft function, creatinine at discharge, wendy creatinine. Reviewed for urinary symptoms/fever/chills/pain/new symptoms. Tolerating medications. Lab data from last visit reviewed including allograft function, urinalysis, any viremia and trough level of medication as well as any imaging reports. HCV:  Epclusa, completed, PHS labs as per protocol, being done today Immunosuppression: reviewed; Noted induction regimen, maintenance regimen and reviewed target trough level. Thymo induction, Belatacept MMF and prednisone. Full dose MMF and 5 mg/d prednisone. On low dose Tacrolimus 1mg/d  DM: Current regimen reviewed. Optimal target glucose levels reviewed for fasting and post prandial measurements. On Tresciba and Novolog premeal. Referred to Endocrinology. Will continue to monitor and adjust treatment; reviewed lifestyle modifications for glycemia control. Has flow sheets to maintain home charts of glucose , blood pressure, temperature, weight and urine output. Hypertension: controlled; Reviewed medications.  Reviewed target for blood pressure control. Switched from Nifedipine to Hydralazine due to edema. Hyperlipidemia: Reviewed medication regimen/lifestyle modification for maintaining target lipid levels. Cardiovascular risk reduction, primary/secondary prevention measures were discussed as appropriate.   Prophylaxis: Reviewed antimicrobial and GI prophylaxis as well as precautions to prevent infections. Patient has met with transplant surgeon post transplant;  ureteral stent removal and follow up care were discussed. Advised to bring flow sheets and medication list at every visit. Discussed ophthalmology and dermatology checks at least yearly and vaccinations. Flu vaccine yearly and Pneumonia vaccine every 5 years. Copy of office visit and lab reports are being sent to primary physician and referring nephrologist.   Follow up every 4 weeks.

## 2024-09-19 LAB
ALBUMIN SERPL ELPH-MCNC: 4.5 G/DL
ALP BLD-CCNC: 100 U/L
ALT SERPL-CCNC: 32 U/L
ANION GAP SERPL CALC-SCNC: 13 MMOL/L
APPEARANCE: CLEAR
AST SERPL-CCNC: 26 U/L
BACTERIA: NEGATIVE /HPF
BILIRUB SERPL-MCNC: 0.8 MG/DL
BILIRUBIN URINE: NEGATIVE
BKV DNA SPEC QL NAA+PROBE: NOT DETECTED IU/ML
BLOOD URINE: NEGATIVE
BUN SERPL-MCNC: 29 MG/DL
CALCIUM SERPL-MCNC: 10.2 MG/DL
CAST: 1 /LPF
CHLORIDE SERPL-SCNC: 101 MMOL/L
CMV DNA SPEC QL NAA+PROBE: NOT DETECTED IU/ML
CMVPCR LOG: NOT DETECTED LOG10IU/ML
CO2 SERPL-SCNC: 26 MMOL/L
COLOR: YELLOW
CREAT SERPL-MCNC: 1.1 MG/DL
CREAT SPEC-SCNC: 74 MG/DL
CREAT/PROT UR: 0.1 RATIO
EGFR: 69 ML/MIN/1.73M2
EPITHELIAL CELLS: 0 /HPF
GLUCOSE QUALITATIVE U: NEGATIVE MG/DL
GLUCOSE SERPL-MCNC: 132 MG/DL
HCT VFR BLD CALC: 39.6 %
HGB BLD-MCNC: 12.9 G/DL
KETONES URINE: NEGATIVE MG/DL
LDH SERPL-CCNC: 399 U/L
LEUKOCYTE ESTERASE URINE: NEGATIVE
MAGNESIUM SERPL-MCNC: 1.6 MG/DL
MCHC RBC-ENTMCNC: 31.6 PG
MCHC RBC-ENTMCNC: 32.6 GM/DL
MCV RBC AUTO: 97.1 FL
MICROSCOPIC-UA: NORMAL
NITRITE URINE: NEGATIVE
PH URINE: 6.5
PHOSPHATE SERPL-MCNC: 3.9 MG/DL
PLATELET # BLD AUTO: 162 K/UL
POTASSIUM SERPL-SCNC: 4.6 MMOL/L
PROT SERPL-MCNC: 6.7 G/DL
PROT UR-MCNC: 8 MG/DL
PROTEIN URINE: NEGATIVE MG/DL
RBC # BLD: 4.08 M/UL
RBC # FLD: 13.3 %
RED BLOOD CELLS URINE: NORMAL /HPF
REVIEW: NORMAL
SODIUM SERPL-SCNC: 139 MMOL/L
SPECIFIC GRAVITY URINE: 1.02
URATE SERPL-MCNC: 6.2 MG/DL
UROBILINOGEN URINE: 0.2 MG/DL
WBC # FLD AUTO: 4.31 K/UL
WHITE BLOOD CELLS URINE: 1 /HPF

## 2024-10-03 ENCOUNTER — APPOINTMENT (OUTPATIENT)
Dept: OPHTHALMOLOGY | Facility: CLINIC | Age: 79
End: 2024-10-03
Payer: MEDICARE

## 2024-10-03 ENCOUNTER — NON-APPOINTMENT (OUTPATIENT)
Age: 79
End: 2024-10-03

## 2024-10-03 PROCEDURE — 99024 POSTOP FOLLOW-UP VISIT: CPT

## 2024-10-07 ENCOUNTER — NON-APPOINTMENT (OUTPATIENT)
Age: 79
End: 2024-10-07

## 2024-10-07 ENCOUNTER — APPOINTMENT (OUTPATIENT)
Dept: CARDIOLOGY | Facility: CLINIC | Age: 79
End: 2024-10-07
Payer: MEDICARE

## 2024-10-07 VITALS
BODY MASS INDEX: 30.67 KG/M2 | WEIGHT: 190 LBS | SYSTOLIC BLOOD PRESSURE: 140 MMHG | DIASTOLIC BLOOD PRESSURE: 70 MMHG | HEART RATE: 81 BPM | OXYGEN SATURATION: 96 %

## 2024-10-07 DIAGNOSIS — E78.00 PURE HYPERCHOLESTEROLEMIA, UNSPECIFIED: ICD-10-CM

## 2024-10-07 DIAGNOSIS — R06.09 OTHER FORMS OF DYSPNEA: ICD-10-CM

## 2024-10-07 DIAGNOSIS — E11.29 TYPE 2 DIABETES MELLITUS WITH OTHER DIABETIC KIDNEY COMPLICATION: ICD-10-CM

## 2024-10-07 PROCEDURE — G2211 COMPLEX E/M VISIT ADD ON: CPT

## 2024-10-07 PROCEDURE — 93000 ELECTROCARDIOGRAM COMPLETE: CPT

## 2024-10-07 PROCEDURE — 99215 OFFICE O/P EST HI 40 MIN: CPT

## 2024-10-17 ENCOUNTER — NON-APPOINTMENT (OUTPATIENT)
Age: 79
End: 2024-10-17

## 2024-10-17 ENCOUNTER — APPOINTMENT (OUTPATIENT)
Dept: NEPHROLOGY | Facility: CLINIC | Age: 79
End: 2024-10-17
Payer: MEDICARE

## 2024-10-17 VITALS
BODY MASS INDEX: 30.99 KG/M2 | WEIGHT: 192 LBS | TEMPERATURE: 97.2 F | HEART RATE: 82 BPM | DIASTOLIC BLOOD PRESSURE: 75 MMHG | SYSTOLIC BLOOD PRESSURE: 146 MMHG | OXYGEN SATURATION: 97 %

## 2024-10-17 DIAGNOSIS — Z94.0 OTHER LONG TERM (CURRENT) DRUG THERAPY: ICD-10-CM

## 2024-10-17 DIAGNOSIS — Z79.899 OTHER LONG TERM (CURRENT) DRUG THERAPY: ICD-10-CM

## 2024-10-17 DIAGNOSIS — E11.9 TYPE 2 DIABETES MELLITUS W/OUT COMPLICATIONS: ICD-10-CM

## 2024-10-17 DIAGNOSIS — I10 ESSENTIAL (PRIMARY) HYPERTENSION: ICD-10-CM

## 2024-10-17 DIAGNOSIS — Z94.0 KIDNEY TRANSPLANT STATUS: ICD-10-CM

## 2024-10-17 LAB
25(OH)D3 SERPL-MCNC: 24.2 NG/ML
ALBUMIN SERPL ELPH-MCNC: 4.4 G/DL
ALP BLD-CCNC: 87 U/L
ALT SERPL-CCNC: 32 U/L
ANION GAP SERPL CALC-SCNC: 12 MMOL/L
APPEARANCE: CLEAR
AST SERPL-CCNC: 28 U/L
BACTERIA: NEGATIVE /HPF
BILIRUB SERPL-MCNC: 0.8 MG/DL
BILIRUBIN URINE: NEGATIVE
BLOOD URINE: NEGATIVE
BUN SERPL-MCNC: 30 MG/DL
CALCIUM SERPL-MCNC: 9.3 MG/DL
CALCIUM SERPL-MCNC: 9.3 MG/DL
CAST: 0 /LPF
CHLORIDE SERPL-SCNC: 104 MMOL/L
CO2 SERPL-SCNC: 25 MMOL/L
COLOR: YELLOW
CREAT SERPL-MCNC: 1.16 MG/DL
CREAT SPEC-SCNC: 88 MG/DL
CREAT/PROT UR: 0.1 RATIO
EGFR: 65 ML/MIN/1.73M2
EPITHELIAL CELLS: 0 /HPF
ESTIMATED AVERAGE GLUCOSE: 137 MG/DL
GLUCOSE QUALITATIVE U: NEGATIVE MG/DL
GLUCOSE SERPL-MCNC: 99 MG/DL
HBA1C MFR BLD HPLC: 6.4 %
HCT VFR BLD CALC: 36.3 %
HGB BLD-MCNC: 12.1 G/DL
KETONES URINE: NEGATIVE MG/DL
LDH SERPL-CCNC: 398 U/L
LEUKOCYTE ESTERASE URINE: ABNORMAL
MAGNESIUM SERPL-MCNC: 1.6 MG/DL
MCHC RBC-ENTMCNC: 32.1 PG
MCHC RBC-ENTMCNC: 33.3 GM/DL
MCV RBC AUTO: 96.3 FL
MICROSCOPIC-UA: NORMAL
NITRITE URINE: NEGATIVE
PARATHYROID HORMONE INTACT: 79 PG/ML
PH URINE: 6.5
PHOSPHATE SERPL-MCNC: 3.4 MG/DL
PLATELET # BLD AUTO: 163 K/UL
POTASSIUM SERPL-SCNC: 4.5 MMOL/L
PROT SERPL-MCNC: 6.4 G/DL
PROT UR-MCNC: 9 MG/DL
PROTEIN URINE: NEGATIVE MG/DL
RBC # BLD: 3.77 M/UL
RBC # FLD: 13.6 %
RED BLOOD CELLS URINE: 1 /HPF
SODIUM SERPL-SCNC: 141 MMOL/L
SPECIFIC GRAVITY URINE: 1.02
TACROLIMUS SERPL-MCNC: 4.5 NG/ML
URATE SERPL-MCNC: 6.8 MG/DL
UROBILINOGEN URINE: 0.2 MG/DL
WBC # FLD AUTO: 3.42 K/UL
WHITE BLOOD CELLS URINE: 1 /HPF

## 2024-10-17 PROCEDURE — 90662 IIV NO PRSV INCREASED AG IM: CPT

## 2024-10-17 PROCEDURE — G0008: CPT

## 2024-10-17 PROCEDURE — 99215 OFFICE O/P EST HI 40 MIN: CPT

## 2024-10-17 RX ORDER — ERGOCALCIFEROL 1.25 MG/1
1.25 MG CAPSULE ORAL
Qty: 4 | Refills: 1 | Status: ACTIVE | COMMUNITY
Start: 2024-10-17 | End: 1900-01-01

## 2024-10-18 LAB
CMV DNA SPEC QL NAA+PROBE: NOT DETECTED IU/ML
CMVPCR LOG: NOT DETECTED LOG10IU/ML

## 2024-10-21 LAB — BKV DNA SPEC QL NAA+PROBE: NOT DETECTED IU/ML

## 2024-10-23 ENCOUNTER — APPOINTMENT (OUTPATIENT)
Dept: OPHTHALMOLOGY | Facility: EYE CENTER | Age: 79
End: 2024-10-23
Payer: MEDICARE

## 2024-10-23 ENCOUNTER — NON-APPOINTMENT (OUTPATIENT)
Age: 79
End: 2024-10-23

## 2024-10-23 PROCEDURE — 66982 XCAPSL CTRC RMVL CPLX WO ECP: CPT | Mod: 79,RT

## 2024-10-24 ENCOUNTER — APPOINTMENT (OUTPATIENT)
Dept: OPHTHALMOLOGY | Facility: CLINIC | Age: 79
End: 2024-10-24
Payer: MEDICARE

## 2024-10-24 ENCOUNTER — NON-APPOINTMENT (OUTPATIENT)
Age: 79
End: 2024-10-24

## 2024-10-24 PROCEDURE — 99024 POSTOP FOLLOW-UP VISIT: CPT

## 2024-10-29 ENCOUNTER — NON-APPOINTMENT (OUTPATIENT)
Age: 79
End: 2024-10-29

## 2024-10-29 ENCOUNTER — APPOINTMENT (OUTPATIENT)
Dept: OPHTHALMOLOGY | Facility: CLINIC | Age: 79
End: 2024-10-29
Payer: MEDICARE

## 2024-10-29 PROCEDURE — 99024 POSTOP FOLLOW-UP VISIT: CPT

## 2024-11-07 DIAGNOSIS — Z94.0 KIDNEY TRANSPLANT STATUS: ICD-10-CM

## 2024-11-19 ENCOUNTER — APPOINTMENT (OUTPATIENT)
Dept: NEPHROLOGY | Facility: CLINIC | Age: 79
End: 2024-11-19
Payer: MEDICARE

## 2024-11-19 VITALS
WEIGHT: 192 LBS | BODY MASS INDEX: 30.86 KG/M2 | DIASTOLIC BLOOD PRESSURE: 77 MMHG | SYSTOLIC BLOOD PRESSURE: 163 MMHG | HEIGHT: 66 IN | OXYGEN SATURATION: 94 % | TEMPERATURE: 97.5 F | HEART RATE: 81 BPM | RESPIRATION RATE: 17 BRPM

## 2024-11-19 VITALS — SYSTOLIC BLOOD PRESSURE: 138 MMHG | DIASTOLIC BLOOD PRESSURE: 66 MMHG

## 2024-11-19 DIAGNOSIS — Z79.899 OTHER LONG TERM (CURRENT) DRUG THERAPY: ICD-10-CM

## 2024-11-19 DIAGNOSIS — Z94.0 OTHER LONG TERM (CURRENT) DRUG THERAPY: ICD-10-CM

## 2024-11-19 DIAGNOSIS — Z94.0 KIDNEY TRANSPLANT STATUS: ICD-10-CM

## 2024-11-19 DIAGNOSIS — E11.29 TYPE 2 DIABETES MELLITUS WITH OTHER DIABETIC KIDNEY COMPLICATION: ICD-10-CM

## 2024-11-19 DIAGNOSIS — I10 ESSENTIAL (PRIMARY) HYPERTENSION: ICD-10-CM

## 2024-11-19 PROCEDURE — 99214 OFFICE O/P EST MOD 30 MIN: CPT

## 2024-11-26 ENCOUNTER — NON-APPOINTMENT (OUTPATIENT)
Age: 79
End: 2024-11-26

## 2024-11-26 ENCOUNTER — APPOINTMENT (OUTPATIENT)
Dept: OPHTHALMOLOGY | Facility: CLINIC | Age: 79
End: 2024-11-26
Payer: MEDICARE

## 2024-11-26 PROCEDURE — 99024 POSTOP FOLLOW-UP VISIT: CPT

## 2024-11-26 PROCEDURE — 92134 CPTRZ OPH DX IMG PST SGM RTA: CPT

## 2024-12-13 ENCOUNTER — NON-APPOINTMENT (OUTPATIENT)
Age: 79
End: 2024-12-13

## 2024-12-13 ENCOUNTER — APPOINTMENT (OUTPATIENT)
Dept: OPHTHALMOLOGY | Facility: CLINIC | Age: 79
End: 2024-12-13
Payer: MEDICARE

## 2024-12-13 PROCEDURE — 99024 POSTOP FOLLOW-UP VISIT: CPT

## 2024-12-17 ENCOUNTER — APPOINTMENT (OUTPATIENT)
Dept: NEPHROLOGY | Facility: CLINIC | Age: 79
End: 2024-12-17
Payer: MEDICARE

## 2024-12-17 VITALS
BODY MASS INDEX: 31.34 KG/M2 | HEART RATE: 82 BPM | DIASTOLIC BLOOD PRESSURE: 63 MMHG | OXYGEN SATURATION: 98 % | SYSTOLIC BLOOD PRESSURE: 117 MMHG | RESPIRATION RATE: 16 BRPM | TEMPERATURE: 98.3 F | WEIGHT: 195 LBS | HEIGHT: 66 IN

## 2024-12-17 DIAGNOSIS — Z79.899 OTHER LONG TERM (CURRENT) DRUG THERAPY: ICD-10-CM

## 2024-12-17 DIAGNOSIS — E11.29 TYPE 2 DIABETES MELLITUS WITH OTHER DIABETIC KIDNEY COMPLICATION: ICD-10-CM

## 2024-12-17 DIAGNOSIS — Z94.0 OTHER LONG TERM (CURRENT) DRUG THERAPY: ICD-10-CM

## 2024-12-17 DIAGNOSIS — I10 ESSENTIAL (PRIMARY) HYPERTENSION: ICD-10-CM

## 2024-12-17 LAB
ALBUMIN SERPL ELPH-MCNC: 4.2 G/DL
ALP BLD-CCNC: 122 U/L
ALT SERPL-CCNC: 41 U/L
ANION GAP SERPL CALC-SCNC: 10 MMOL/L
APPEARANCE: CLEAR
AST SERPL-CCNC: 31 U/L
BACTERIA: NEGATIVE /HPF
BILIRUB SERPL-MCNC: 0.6 MG/DL
BILIRUBIN URINE: NEGATIVE
BLOOD URINE: NEGATIVE
BUN SERPL-MCNC: 25 MG/DL
CALCIUM SERPL-MCNC: 9.6 MG/DL
CAST: 0 /LPF
CHLORIDE SERPL-SCNC: 102 MMOL/L
CO2 SERPL-SCNC: 28 MMOL/L
COLOR: YELLOW
CREAT SERPL-MCNC: 1.07 MG/DL
CREAT SPEC-SCNC: 96 MG/DL
CREAT/PROT UR: 0.1 RATIO
EGFR: 71 ML/MIN/1.73M2
EPITHELIAL CELLS: 0 /HPF
GLUCOSE QUALITATIVE U: NEGATIVE MG/DL
GLUCOSE SERPL-MCNC: 165 MG/DL
HCT VFR BLD CALC: 38.8 %
HGB BLD-MCNC: 12.2 G/DL
KETONES URINE: NEGATIVE MG/DL
LDH SERPL-CCNC: 370 U/L
LEUKOCYTE ESTERASE URINE: NEGATIVE
MAGNESIUM SERPL-MCNC: 1.2 MG/DL
MCHC RBC-ENTMCNC: 31 PG
MCHC RBC-ENTMCNC: 31.4 G/DL
MCV RBC AUTO: 98.7 FL
MICROSCOPIC-UA: NORMAL
NITRITE URINE: NEGATIVE
PH URINE: 6.5
PHOSPHATE SERPL-MCNC: 2.8 MG/DL
PLATELET # BLD AUTO: 164 K/UL
POTASSIUM SERPL-SCNC: 4.7 MMOL/L
PROT SERPL-MCNC: 6.3 G/DL
PROT UR-MCNC: 11 MG/DL
PROTEIN URINE: NEGATIVE MG/DL
RBC # BLD: 3.93 M/UL
RBC # FLD: 13 %
RED BLOOD CELLS URINE: 1 /HPF
SODIUM SERPL-SCNC: 141 MMOL/L
SPECIFIC GRAVITY URINE: 1.02
TACROLIMUS SERPL-MCNC: 3.9 NG/ML
URATE SERPL-MCNC: 6.4 MG/DL
UROBILINOGEN URINE: 0.2 MG/DL
WBC # FLD AUTO: 3.71 K/UL
WHITE BLOOD CELLS URINE: 0 /HPF

## 2024-12-17 PROCEDURE — 99214 OFFICE O/P EST MOD 30 MIN: CPT

## 2024-12-17 RX ORDER — OMEGA-3/DHA/EPA/FISH OIL 300-1000MG
400 CAPSULE ORAL
Qty: 60 | Refills: 4 | Status: ACTIVE | COMMUNITY
Start: 2024-12-17 | End: 1900-01-01

## 2024-12-18 ENCOUNTER — NON-APPOINTMENT (OUTPATIENT)
Age: 79
End: 2024-12-18

## 2024-12-18 DIAGNOSIS — Z94.0 KIDNEY TRANSPLANT STATUS: ICD-10-CM

## 2024-12-18 LAB
BKV DNA SPEC QL NAA+PROBE: NOT DETECTED IU/ML
CMV DNA SPEC QL NAA+PROBE: ABNORMAL IU/ML
CMVPCR LOG: 4.23 LOG10IU/ML

## 2024-12-18 RX ORDER — VALGANCICLOVIR HYDROCHLORIDE 450 MG/1
450 TABLET ORAL
Qty: 120 | Refills: 1 | Status: ACTIVE | COMMUNITY
Start: 2024-12-18 | End: 1900-01-01

## 2025-01-06 ENCOUNTER — APPOINTMENT (OUTPATIENT)
Dept: TRANSPLANT | Facility: CLINIC | Age: 80
End: 2025-01-06

## 2025-01-06 LAB
ALBUMIN SERPL ELPH-MCNC: 4.4 G/DL
ALP BLD-CCNC: 122 U/L
ALT SERPL-CCNC: 51 U/L
ANION GAP SERPL CALC-SCNC: 12 MMOL/L
APPEARANCE: CLEAR
AST SERPL-CCNC: 32 U/L
BACTERIA: NEGATIVE /HPF
BILIRUB SERPL-MCNC: 0.8 MG/DL
BILIRUBIN URINE: NEGATIVE
BLOOD URINE: NEGATIVE
BUN SERPL-MCNC: 35 MG/DL
CALCIUM SERPL-MCNC: 9.2 MG/DL
CAST: 0 /LPF
CHLORIDE SERPL-SCNC: 105 MMOL/L
CO2 SERPL-SCNC: 26 MMOL/L
COLOR: YELLOW
CREAT SERPL-MCNC: 1.07 MG/DL
CREAT SPEC-SCNC: 97 MG/DL
CREAT/PROT UR: 0.1 RATIO
EGFR: 71 ML/MIN/1.73M2
EPITHELIAL CELLS: 0 /HPF
GLUCOSE QUALITATIVE U: NEGATIVE MG/DL
GLUCOSE SERPL-MCNC: 174 MG/DL
HCT VFR BLD CALC: 41.3 %
HGB BLD-MCNC: 13.1 G/DL
KETONES URINE: NEGATIVE MG/DL
LEUKOCYTE ESTERASE URINE: NEGATIVE
MAGNESIUM SERPL-MCNC: 1.7 MG/DL
MCHC RBC-ENTMCNC: 31.3 PG
MCHC RBC-ENTMCNC: 31.7 G/DL
MCV RBC AUTO: 98.8 FL
MICROSCOPIC-UA: NORMAL
NITRITE URINE: NEGATIVE
PH URINE: 6.5
PHOSPHATE SERPL-MCNC: 2.9 MG/DL
PLATELET # BLD AUTO: 136 K/UL
POTASSIUM SERPL-SCNC: 5.1 MMOL/L
PROT SERPL-MCNC: 6.4 G/DL
PROT UR-MCNC: 9 MG/DL
PROTEIN URINE: NEGATIVE MG/DL
RBC # BLD: 4.18 M/UL
RBC # FLD: 14.8 %
RED BLOOD CELLS URINE: 1 /HPF
SODIUM SERPL-SCNC: 142 MMOL/L
SPECIFIC GRAVITY URINE: 1.02
TACROLIMUS SERPL-MCNC: 5.3 NG/ML
UROBILINOGEN URINE: 0.2 MG/DL
WBC # FLD AUTO: 1.27 K/UL
WHITE BLOOD CELLS URINE: 0 /HPF

## 2025-01-08 LAB
BKV DNA SPEC QL NAA+PROBE: NOT DETECTED IU/ML
CMV DNA SPEC QL NAA+PROBE: 109 IU/ML
CMVPCR LOG: 2.04 LOG10IU/ML

## 2025-01-13 DIAGNOSIS — Z94.0 KIDNEY TRANSPLANT STATUS: ICD-10-CM

## 2025-01-15 ENCOUNTER — APPOINTMENT (OUTPATIENT)
Dept: TRANSPLANT | Facility: CLINIC | Age: 80
End: 2025-01-15

## 2025-01-15 ENCOUNTER — LABORATORY RESULT (OUTPATIENT)
Age: 80
End: 2025-01-15

## 2025-01-15 LAB
ALBUMIN SERPL ELPH-MCNC: 4.4 G/DL
ANION GAP SERPL CALC-SCNC: 11 MMOL/L
BASOPHILS # BLD AUTO: 0.03 K/UL
BASOPHILS NFR BLD AUTO: 2 %
BUN SERPL-MCNC: 29 MG/DL
CALCIUM SERPL-MCNC: 10.2 MG/DL
CHLORIDE SERPL-SCNC: 99 MMOL/L
CO2 SERPL-SCNC: 25 MMOL/L
CREAT SERPL-MCNC: 1.06 MG/DL
EGFR: 72 ML/MIN/1.73M2
EOSINOPHIL # BLD AUTO: 0.02 K/UL
EOSINOPHIL NFR BLD AUTO: 1 %
GLUCOSE SERPL-MCNC: 170 MG/DL
HCT VFR BLD CALC: 41.2 %
HGB BLD-MCNC: 13.3 G/DL
LYMPHOCYTES # BLD AUTO: 0.16 K/UL
LYMPHOCYTES NFR BLD AUTO: 10 %
MAN DIFF?: NORMAL
MCHC RBC-ENTMCNC: 31.7 PG
MCHC RBC-ENTMCNC: 32.3 G/DL
MCV RBC AUTO: 98.3 FL
MONOCYTES # BLD AUTO: 0.06 K/UL
MONOCYTES NFR BLD AUTO: 4 %
NEUTROPHILS # BLD AUTO: 1.29 K/UL
NEUTROPHILS NFR BLD AUTO: 80 %
PHOSPHATE SERPL-MCNC: 3.8 MG/DL
PLATELET # BLD AUTO: 150 K/UL
POTASSIUM SERPL-SCNC: 5 MMOL/L
RBC # BLD: 4.19 M/UL
RBC # FLD: 15.3 %
SODIUM SERPL-SCNC: 136 MMOL/L
TACROLIMUS SERPL-MCNC: 6.4 NG/ML
WBC # FLD AUTO: 1.61 K/UL

## 2025-01-16 LAB
CMV DNA SPEC QL NAA+PROBE: NOT DETECTED IU/ML
CMVPCR LOG: NOT DETECTED LOG10IU/ML

## 2025-01-28 ENCOUNTER — APPOINTMENT (OUTPATIENT)
Dept: NEPHROLOGY | Facility: CLINIC | Age: 80
End: 2025-01-28
Payer: MEDICARE

## 2025-01-28 VITALS
OXYGEN SATURATION: 97 % | SYSTOLIC BLOOD PRESSURE: 154 MMHG | HEART RATE: 79 BPM | BODY MASS INDEX: 31.5 KG/M2 | TEMPERATURE: 98.3 F | HEIGHT: 66 IN | DIASTOLIC BLOOD PRESSURE: 76 MMHG | RESPIRATION RATE: 16 BRPM | WEIGHT: 196 LBS

## 2025-01-28 DIAGNOSIS — J40 BRONCHITIS, NOT SPECIFIED AS ACUTE OR CHRONIC: ICD-10-CM

## 2025-01-28 DIAGNOSIS — Z94.0 KIDNEY TRANSPLANT STATUS: ICD-10-CM

## 2025-01-28 DIAGNOSIS — Z94.0 OTHER LONG TERM (CURRENT) DRUG THERAPY: ICD-10-CM

## 2025-01-28 DIAGNOSIS — E11.29 TYPE 2 DIABETES MELLITUS WITH OTHER DIABETIC KIDNEY COMPLICATION: ICD-10-CM

## 2025-01-28 DIAGNOSIS — Z79.899 OTHER LONG TERM (CURRENT) DRUG THERAPY: ICD-10-CM

## 2025-01-28 PROCEDURE — 99214 OFFICE O/P EST MOD 30 MIN: CPT

## 2025-01-28 RX ORDER — BENZONATATE 100 MG/1
100 CAPSULE ORAL 3 TIMES DAILY
Qty: 30 | Refills: 0 | Status: ACTIVE | COMMUNITY
Start: 2025-01-28 | End: 1900-01-01

## 2025-01-28 RX ORDER — AZITHROMYCIN 250 MG/1
250 TABLET, FILM COATED ORAL
Qty: 1 | Refills: 1 | Status: ACTIVE | COMMUNITY
Start: 2025-01-28 | End: 1900-01-01

## 2025-01-29 LAB
ALBUMIN SERPL ELPH-MCNC: 3.7 G/DL
ALP BLD-CCNC: 93 U/L
ALT SERPL-CCNC: 24 U/L
ANION GAP SERPL CALC-SCNC: 15 MMOL/L
APPEARANCE: CLEAR
AST SERPL-CCNC: 30 U/L
BACTERIA: NEGATIVE /HPF
BILIRUB SERPL-MCNC: 0.9 MG/DL
BILIRUBIN URINE: NEGATIVE
BLOOD URINE: NEGATIVE
BUN SERPL-MCNC: 20 MG/DL
CALCIUM SERPL-MCNC: 9.2 MG/DL
CAST: 0 /LPF
CHLORIDE SERPL-SCNC: 101 MMOL/L
CMV DNA SPEC QL NAA+PROBE: NOT DETECTED IU/ML
CMVPCR LOG: NOT DETECTED LOG10IU/ML
CO2 SERPL-SCNC: 24 MMOL/L
COLOR: YELLOW
CREAT SERPL-MCNC: 0.99 MG/DL
CREAT SPEC-SCNC: 96 MG/DL
CREAT/PROT UR: 0.3 RATIO
EGFR: 78 ML/MIN/1.73M2
EPITHELIAL CELLS: 0 /HPF
GLUCOSE QUALITATIVE U: NEGATIVE MG/DL
GLUCOSE SERPL-MCNC: 92 MG/DL
HCT VFR BLD CALC: 38.9 %
HGB BLD-MCNC: 12.2 G/DL
KETONES URINE: NEGATIVE MG/DL
LDH SERPL-CCNC: 571 U/L
LEUKOCYTE ESTERASE URINE: NEGATIVE
MAGNESIUM SERPL-MCNC: 1.5 MG/DL
MCHC RBC-ENTMCNC: 31.4 G/DL
MCHC RBC-ENTMCNC: 31.9 PG
MCV RBC AUTO: 101.6 FL
MICROSCOPIC-UA: NORMAL
NITRITE URINE: NEGATIVE
PH URINE: 7
PHOSPHATE SERPL-MCNC: 2.7 MG/DL
PLATELET # BLD AUTO: 185 K/UL
POTASSIUM SERPL-SCNC: 5 MMOL/L
PROT SERPL-MCNC: 6.1 G/DL
PROT UR-MCNC: 25 MG/DL
PROTEIN URINE: 30 MG/DL
RBC # BLD: 3.83 M/UL
RBC # FLD: 15.7 %
RED BLOOD CELLS URINE: 1 /HPF
SODIUM SERPL-SCNC: 140 MMOL/L
SPECIFIC GRAVITY URINE: 1.02
TACROLIMUS SERPL-MCNC: 6.8 NG/ML
URATE SERPL-MCNC: 5.2 MG/DL
UROBILINOGEN URINE: 0.2 MG/DL
WBC # FLD AUTO: 1.92 K/UL
WHITE BLOOD CELLS URINE: 0 /HPF

## 2025-01-30 LAB — BKV DNA SPEC QL NAA+PROBE: NOT DETECTED IU/ML

## 2025-01-31 LAB
RAPID RVP RESULT: DETECTED
SARS-COV-2 RNA RESP QL NAA+PROBE: DETECTED

## 2025-02-10 DIAGNOSIS — Z94.0 KIDNEY TRANSPLANT STATUS: ICD-10-CM

## 2025-02-11 ENCOUNTER — APPOINTMENT (OUTPATIENT)
Dept: TRANSPLANT | Facility: CLINIC | Age: 80
End: 2025-02-11

## 2025-02-11 ENCOUNTER — APPOINTMENT (OUTPATIENT)
Dept: CARDIOLOGY | Facility: CLINIC | Age: 80
End: 2025-02-11

## 2025-02-14 ENCOUNTER — NON-APPOINTMENT (OUTPATIENT)
Age: 80
End: 2025-02-14

## 2025-02-14 LAB
ALBUMIN SERPL ELPH-MCNC: 4.3 G/DL
ALP BLD-CCNC: 96 U/L
ALT SERPL-CCNC: 39 U/L
ANION GAP SERPL CALC-SCNC: 12 MMOL/L
APPEARANCE: CLEAR
AST SERPL-CCNC: 29 U/L
BACTERIA: NEGATIVE /HPF
BILIRUB SERPL-MCNC: 0.6 MG/DL
BILIRUBIN URINE: NEGATIVE
BLOOD URINE: NEGATIVE
BUN SERPL-MCNC: 48 MG/DL
CALCIUM SERPL-MCNC: 10.1 MG/DL
CAST: 0 /LPF
CHLORIDE SERPL-SCNC: 103 MMOL/L
CO2 SERPL-SCNC: 28 MMOL/L
COLOR: YELLOW
CREAT SERPL-MCNC: 1.29 MG/DL
CREAT SPEC-SCNC: 63 MG/DL
CREAT/PROT UR: 0.1 RATIO
EGFR: 57 ML/MIN/1.73M2
EPITHELIAL CELLS: 0 /HPF
GLUCOSE QUALITATIVE U: NEGATIVE MG/DL
GLUCOSE SERPL-MCNC: 74 MG/DL
HCT VFR BLD CALC: 40.5 %
HGB BLD-MCNC: 13.2 G/DL
KETONES URINE: NEGATIVE MG/DL
LDH SERPL-CCNC: 443 U/L
LEUKOCYTE ESTERASE URINE: NEGATIVE
MAGNESIUM SERPL-MCNC: 1.8 MG/DL
MCHC RBC-ENTMCNC: 32 PG
MCHC RBC-ENTMCNC: 32.6 G/DL
MCV RBC AUTO: 98.1 FL
MICROSCOPIC-UA: NORMAL
NITRITE URINE: NEGATIVE
PH URINE: 7.5
PHOSPHATE SERPL-MCNC: 3.6 MG/DL
PLATELET # BLD AUTO: 208 K/UL
POTASSIUM SERPL-SCNC: 4.7 MMOL/L
PROT SERPL-MCNC: 6.4 G/DL
PROT UR-MCNC: 5 MG/DL
PROTEIN URINE: NEGATIVE MG/DL
RBC # BLD: 4.13 M/UL
RBC # FLD: 15.9 %
RED BLOOD CELLS URINE: 0 /HPF
SODIUM SERPL-SCNC: 143 MMOL/L
SPECIFIC GRAVITY URINE: 1.02
TACROLIMUS SERPL-MCNC: 4.9 NG/ML
URATE SERPL-MCNC: 7.9 MG/DL
UROBILINOGEN URINE: 0.2 MG/DL
WBC # FLD AUTO: 2.52 K/UL
WHITE BLOOD CELLS URINE: 0 /HPF

## 2025-02-19 LAB
BKV DNA SPEC QL NAA+PROBE: NOT DETECTED IU/ML
CMV DNA SPEC QL NAA+PROBE: NOT DETECTED IU/ML
CMVPCR LOG: NOT DETECTED LOG10IU/ML

## 2025-02-25 ENCOUNTER — NON-APPOINTMENT (OUTPATIENT)
Age: 80
End: 2025-02-25

## 2025-02-25 ENCOUNTER — APPOINTMENT (OUTPATIENT)
Dept: OPHTHALMOLOGY | Facility: CLINIC | Age: 80
End: 2025-02-25
Payer: MEDICARE

## 2025-02-25 PROCEDURE — 92250 FUNDUS PHOTOGRAPHY W/I&R: CPT

## 2025-02-25 PROCEDURE — 92014 COMPRE OPH EXAM EST PT 1/>: CPT

## 2025-02-25 PROCEDURE — 92286 ANT SGM IMG I&R SPECLR MIC: CPT

## 2025-03-05 ENCOUNTER — NON-APPOINTMENT (OUTPATIENT)
Age: 80
End: 2025-03-05

## 2025-03-05 ENCOUNTER — APPOINTMENT (OUTPATIENT)
Dept: CARDIOLOGY | Facility: CLINIC | Age: 80
End: 2025-03-05
Payer: MEDICARE

## 2025-03-05 VITALS
SYSTOLIC BLOOD PRESSURE: 134 MMHG | HEART RATE: 78 BPM | WEIGHT: 192 LBS | HEIGHT: 66 IN | BODY MASS INDEX: 30.86 KG/M2 | DIASTOLIC BLOOD PRESSURE: 72 MMHG | OXYGEN SATURATION: 98 %

## 2025-03-05 DIAGNOSIS — I10 ESSENTIAL (PRIMARY) HYPERTENSION: ICD-10-CM

## 2025-03-05 DIAGNOSIS — Z94.0 KIDNEY TRANSPLANT STATUS: ICD-10-CM

## 2025-03-05 DIAGNOSIS — E78.00 PURE HYPERCHOLESTEROLEMIA, UNSPECIFIED: ICD-10-CM

## 2025-03-05 PROCEDURE — 93000 ELECTROCARDIOGRAM COMPLETE: CPT

## 2025-03-05 PROCEDURE — 99215 OFFICE O/P EST HI 40 MIN: CPT

## 2025-03-08 ENCOUNTER — NON-APPOINTMENT (OUTPATIENT)
Age: 80
End: 2025-03-08

## 2025-04-02 ENCOUNTER — APPOINTMENT (OUTPATIENT)
Dept: CARDIOLOGY | Facility: CLINIC | Age: 80
End: 2025-04-02

## 2025-04-02 PROCEDURE — 93306 TTE W/DOPPLER COMPLETE: CPT

## 2025-04-22 ENCOUNTER — APPOINTMENT (OUTPATIENT)
Dept: NEPHROLOGY | Facility: CLINIC | Age: 80
End: 2025-04-22
Payer: MEDICARE

## 2025-04-22 VITALS
WEIGHT: 198 LBS | TEMPERATURE: 97.6 F | BODY MASS INDEX: 31.82 KG/M2 | HEIGHT: 66 IN | OXYGEN SATURATION: 98 % | SYSTOLIC BLOOD PRESSURE: 145 MMHG | HEART RATE: 84 BPM | DIASTOLIC BLOOD PRESSURE: 74 MMHG

## 2025-04-22 DIAGNOSIS — I10 ESSENTIAL (PRIMARY) HYPERTENSION: ICD-10-CM

## 2025-04-22 DIAGNOSIS — E11.29 TYPE 2 DIABETES MELLITUS WITH OTHER DIABETIC KIDNEY COMPLICATION: ICD-10-CM

## 2025-04-22 DIAGNOSIS — Z79.899 OTHER LONG TERM (CURRENT) DRUG THERAPY: ICD-10-CM

## 2025-04-22 DIAGNOSIS — Z94.0 OTHER LONG TERM (CURRENT) DRUG THERAPY: ICD-10-CM

## 2025-04-22 PROCEDURE — 99214 OFFICE O/P EST MOD 30 MIN: CPT

## 2025-04-23 DIAGNOSIS — Z94.0 KIDNEY TRANSPLANT STATUS: ICD-10-CM

## 2025-04-23 LAB
ALBUMIN SERPL ELPH-MCNC: 4.5 G/DL
ALP BLD-CCNC: 81 U/L
ALT SERPL-CCNC: 35 U/L
ANION GAP SERPL CALC-SCNC: 13 MMOL/L
APPEARANCE: CLEAR
AST SERPL-CCNC: 31 U/L
BACTERIA: NEGATIVE /HPF
BILIRUB SERPL-MCNC: 1.1 MG/DL
BILIRUBIN URINE: NEGATIVE
BLOOD URINE: NEGATIVE
BUN SERPL-MCNC: 27 MG/DL
CALCIUM SERPL-MCNC: 10 MG/DL
CAST: 0 /LPF
CHLORIDE SERPL-SCNC: 103 MMOL/L
CMV DNA SPEC QL NAA+PROBE: ABNORMAL IU/ML
CMVPCR LOG: ABNORMAL LOG10IU/ML
CO2 SERPL-SCNC: 26 MMOL/L
COLOR: YELLOW
CREAT SERPL-MCNC: 1.07 MG/DL
CREAT SPEC-SCNC: 49 MG/DL
CREAT/PROT UR: 0.2 RATIO
EGFRCR SERPLBLD CKD-EPI 2021: 71 ML/MIN/1.73M2
EPITHELIAL CELLS: 0 /HPF
GLUCOSE QUALITATIVE U: NEGATIVE MG/DL
GLUCOSE SERPL-MCNC: 112 MG/DL
HCT VFR BLD CALC: 41.6 %
HGB BLD-MCNC: 13.8 G/DL
KETONES URINE: NEGATIVE MG/DL
LDH SERPL-CCNC: 461 U/L
LEUKOCYTE ESTERASE URINE: ABNORMAL
MAGNESIUM SERPL-MCNC: 1.7 MG/DL
MCHC RBC-ENTMCNC: 33.2 G/DL
MCHC RBC-ENTMCNC: 34.3 PG
MCV RBC AUTO: 103.5 FL
MICROSCOPIC-UA: NORMAL
NITRITE URINE: NEGATIVE
PH URINE: 8
PHOSPHATE SERPL-MCNC: 4 MG/DL
PLATELET # BLD AUTO: 131 K/UL
POTASSIUM SERPL-SCNC: 5.3 MMOL/L
PROT SERPL-MCNC: 6.2 G/DL
PROT UR-MCNC: 11 MG/DL
PROTEIN URINE: NORMAL MG/DL
RBC # BLD: 4.02 M/UL
RBC # FLD: 14.1 %
RED BLOOD CELLS URINE: 1 /HPF
SODIUM SERPL-SCNC: 141 MMOL/L
SPECIFIC GRAVITY URINE: 1.02
TACROLIMUS SERPL-MCNC: 4.2 NG/ML
URATE SERPL-MCNC: 6.5 MG/DL
UROBILINOGEN URINE: 0.2 MG/DL
WBC # FLD AUTO: 2.63 K/UL
WHITE BLOOD CELLS URINE: 0 /HPF

## 2025-04-23 RX ORDER — SIROLIMUS 1 MG/1
1 TABLET, FILM COATED ORAL
Qty: 30 | Refills: 11 | Status: ACTIVE | COMMUNITY
Start: 2025-04-23 | End: 1900-01-01

## 2025-04-24 LAB — BKV DNA SPEC QL NAA+PROBE: NOT DETECTED IU/ML

## 2025-05-12 ENCOUNTER — APPOINTMENT (OUTPATIENT)
Dept: TRANSPLANT | Facility: CLINIC | Age: 80
End: 2025-05-12

## 2025-05-13 ENCOUNTER — NON-APPOINTMENT (OUTPATIENT)
Age: 80
End: 2025-05-13

## 2025-05-22 ENCOUNTER — APPOINTMENT (OUTPATIENT)
Dept: NEPHROLOGY | Facility: CLINIC | Age: 80
End: 2025-05-22
Payer: MEDICARE

## 2025-05-22 VITALS
WEIGHT: 195 LBS | HEIGHT: 66 IN | OXYGEN SATURATION: 96 % | HEART RATE: 87 BPM | DIASTOLIC BLOOD PRESSURE: 74 MMHG | SYSTOLIC BLOOD PRESSURE: 168 MMHG | TEMPERATURE: 97.3 F | BODY MASS INDEX: 31.34 KG/M2

## 2025-05-22 VITALS — DIASTOLIC BLOOD PRESSURE: 80 MMHG | SYSTOLIC BLOOD PRESSURE: 140 MMHG

## 2025-05-22 DIAGNOSIS — I10 ESSENTIAL (PRIMARY) HYPERTENSION: ICD-10-CM

## 2025-05-22 DIAGNOSIS — E11.29 TYPE 2 DIABETES MELLITUS WITH OTHER DIABETIC KIDNEY COMPLICATION: ICD-10-CM

## 2025-05-22 DIAGNOSIS — Z94.0 KIDNEY TRANSPLANT STATUS: ICD-10-CM

## 2025-05-22 DIAGNOSIS — Z94.0 OTHER LONG TERM (CURRENT) DRUG THERAPY: ICD-10-CM

## 2025-05-22 DIAGNOSIS — Z79.899 OTHER LONG TERM (CURRENT) DRUG THERAPY: ICD-10-CM

## 2025-05-22 PROCEDURE — 99214 OFFICE O/P EST MOD 30 MIN: CPT

## 2025-05-27 ENCOUNTER — APPOINTMENT (OUTPATIENT)
Dept: OPHTHALMOLOGY | Facility: CLINIC | Age: 80
End: 2025-05-27
Payer: MEDICARE

## 2025-05-27 ENCOUNTER — NON-APPOINTMENT (OUTPATIENT)
Age: 80
End: 2025-05-27

## 2025-05-27 PROCEDURE — 92014 COMPRE OPH EXAM EST PT 1/>: CPT

## 2025-05-29 DIAGNOSIS — N28.9 DISORDER OF KIDNEY AND URETER, UNSPECIFIED: ICD-10-CM

## 2025-05-29 LAB
ALBUMIN SERPL ELPH-MCNC: 4.3 G/DL
ALP BLD-CCNC: 95 U/L
ALT SERPL-CCNC: 47 U/L
ANION GAP SERPL CALC-SCNC: 14 MMOL/L
APPEARANCE: CLEAR
AST SERPL-CCNC: 36 U/L
BACTERIA: NEGATIVE /HPF
BILIRUB SERPL-MCNC: 0.6 MG/DL
BILIRUBIN URINE: NEGATIVE
BKV DNA SPEC QL NAA+PROBE: ABNORMAL IU/ML
BLOOD URINE: NEGATIVE
BUN SERPL-MCNC: 31 MG/DL
CALCIUM SERPL-MCNC: 9.6 MG/DL
CAST: 0 /LPF
CHLORIDE SERPL-SCNC: 106 MMOL/L
CMV DNA SPEC QL NAA+PROBE: ABNORMAL IU/ML
CMVPCR LOG: ABNORMAL LOG10IU/ML
CO2 SERPL-SCNC: 23 MMOL/L
COLOR: YELLOW
CREAT SERPL-MCNC: 1.04 MG/DL
CREAT SPEC-SCNC: 33 MG/DL
CREAT/PROT UR: 0.3 RATIO
EGFRCR SERPLBLD CKD-EPI 2021: 74 ML/MIN/1.73M2
EPITHELIAL CELLS: 0 /HPF
GLUCOSE QUALITATIVE U: NEGATIVE MG/DL
GLUCOSE SERPL-MCNC: 123 MG/DL
HCT VFR BLD CALC: 41 %
HGB BLD-MCNC: 13.3 G/DL
KETONES URINE: NEGATIVE MG/DL
LDH SERPL-CCNC: 505 U/L
LEUKOCYTE ESTERASE URINE: NEGATIVE
MAGNESIUM SERPL-MCNC: 1.7 MG/DL
MCHC RBC-ENTMCNC: 32.4 G/DL
MCHC RBC-ENTMCNC: 33.2 PG
MCV RBC AUTO: 102.2 FL
MICROSCOPIC-UA: NORMAL
NITRITE URINE: NEGATIVE
PH URINE: 7
PHOSPHATE SERPL-MCNC: 3.1 MG/DL
PLATELET # BLD AUTO: 139 K/UL
POTASSIUM SERPL-SCNC: 4.7 MMOL/L
PROT SERPL-MCNC: 6.4 G/DL
PROT UR-MCNC: 10 MG/DL
PROTEIN URINE: NEGATIVE MG/DL
RBC # BLD: 4.01 M/UL
RBC # FLD: 12.8 %
RED BLOOD CELLS URINE: 2 /HPF
SIROLIMUS BLD-MCNC: 12.7 NG/ML
SODIUM SERPL-SCNC: 143 MMOL/L
SPECIFIC GRAVITY URINE: 1.01
TACROLIMUS SERPL-MCNC: 3 NG/ML
URATE SERPL-MCNC: 6.2 MG/DL
UROBILINOGEN URINE: 0.2 MG/DL
WBC # FLD AUTO: 5.64 K/UL
WHITE BLOOD CELLS URINE: 1 /HPF

## 2025-06-09 ENCOUNTER — APPOINTMENT (OUTPATIENT)
Dept: UROLOGY | Facility: CLINIC | Age: 80
End: 2025-06-09

## 2025-06-19 ENCOUNTER — APPOINTMENT (OUTPATIENT)
Dept: TRANSPLANT | Facility: CLINIC | Age: 80
End: 2025-06-19

## 2025-06-19 LAB
ALBUMIN SERPL ELPH-MCNC: 4.3 G/DL
ALP BLD-CCNC: 102 U/L
ALT SERPL-CCNC: 42 U/L
ANION GAP SERPL CALC-SCNC: 14 MMOL/L
AST SERPL-CCNC: 33 U/L
BILIRUB SERPL-MCNC: 0.7 MG/DL
BUN SERPL-MCNC: 46 MG/DL
CALCIUM SERPL-MCNC: 9.7 MG/DL
CHLORIDE SERPL-SCNC: 103 MMOL/L
CO2 SERPL-SCNC: 25 MMOL/L
CREAT SERPL-MCNC: 1.52 MG/DL
EGFRCR SERPLBLD CKD-EPI 2021: 47 ML/MIN/1.73M2
GLUCOSE SERPL-MCNC: 108 MG/DL
MAGNESIUM SERPL-MCNC: 2 MG/DL
POTASSIUM SERPL-SCNC: 4.2 MMOL/L
PROT SERPL-MCNC: 6.2 G/DL
SIROLIMUS BLD-MCNC: 7.2 NG/ML
SODIUM SERPL-SCNC: 142 MMOL/L
TACROLIMUS SERPL-MCNC: 3.2 NG/ML

## 2025-06-23 ENCOUNTER — NON-APPOINTMENT (OUTPATIENT)
Age: 80
End: 2025-06-23

## 2025-06-23 LAB — BKV DNA SPEC QL NAA+PROBE: ABNORMAL IU/ML

## 2025-06-26 ENCOUNTER — LABORATORY RESULT (OUTPATIENT)
Age: 80
End: 2025-06-26

## 2025-06-26 ENCOUNTER — APPOINTMENT (OUTPATIENT)
Dept: TRANSPLANT | Facility: CLINIC | Age: 80
End: 2025-06-26

## 2025-06-30 LAB
ALBUMIN SERPL ELPH-MCNC: 4.4 G/DL
ALP BLD-CCNC: 98 U/L
ALT SERPL-CCNC: 46 U/L
ANION GAP SERPL CALC-SCNC: 12 MMOL/L
APPEARANCE: CLEAR
AST SERPL-CCNC: 34 U/L
BASOPHILS # BLD AUTO: 0.03 K/UL
BASOPHILS NFR BLD AUTO: 0.6 %
BILIRUB SERPL-MCNC: 0.9 MG/DL
BILIRUBIN URINE: NEGATIVE
BKV DNA SPEC QL NAA+PROBE: ABNORMAL IU/ML
BLOOD URINE: NEGATIVE
BUN SERPL-MCNC: 40 MG/DL
CALCIUM SERPL-MCNC: 10.2 MG/DL
CALCIUM SERPL-MCNC: 10.2 MG/DL
CHLORIDE SERPL-SCNC: 103 MMOL/L
CMV DNA SPEC QL NAA+PROBE: 58 IU/ML
CMVPCR LOG: 1.76 LOG10IU/ML
CO2 SERPL-SCNC: 28 MMOL/L
COLOR: YELLOW
CREAT SERPL-MCNC: 1.6 MG/DL
CREAT SPEC-SCNC: 69 MG/DL
CREAT/PROT UR: 0.1 RATIO
EGFRCR SERPLBLD CKD-EPI 2021: 44 ML/MIN/1.73M2
EOSINOPHIL # BLD AUTO: 0.1 K/UL
EOSINOPHIL NFR BLD AUTO: 2.1 %
GLUCOSE QUALITATIVE U: NEGATIVE MG/DL
GLUCOSE SERPL-MCNC: 88 MG/DL
HCT VFR BLD CALC: 40.9 %
HGB BLD-MCNC: 13.2 G/DL
IMM GRANULOCYTES NFR BLD AUTO: 1.3 %
KETONES URINE: NEGATIVE MG/DL
LEUKOCYTE ESTERASE URINE: ABNORMAL
LYMPHOCYTES # BLD AUTO: 0.41 K/UL
LYMPHOCYTES NFR BLD AUTO: 8.7 %
MAGNESIUM SERPL-MCNC: 2 MG/DL
MAN DIFF?: NORMAL
MCHC RBC-ENTMCNC: 31.5 PG
MCHC RBC-ENTMCNC: 32.3 G/DL
MCV RBC AUTO: 97.6 FL
MONOCYTES # BLD AUTO: 0.63 K/UL
MONOCYTES NFR BLD AUTO: 13.4 %
NEUTROPHILS # BLD AUTO: 3.48 K/UL
NEUTROPHILS NFR BLD AUTO: 73.9 %
NITRITE URINE: NEGATIVE
PARATHYROID HORMONE INTACT: 56 PG/ML
PH URINE: 7.5
PHOSPHATE SERPL-MCNC: 3.9 MG/DL
PLATELET # BLD AUTO: 145 K/UL
POTASSIUM SERPL-SCNC: 4.5 MMOL/L
PROT SERPL-MCNC: 6.6 G/DL
PROT UR-MCNC: 9 MG/DL
PROTEIN URINE: NEGATIVE MG/DL
RBC # BLD: 4.19 M/UL
RBC # FLD: 13.8 %
SIROLIMUS BLD-MCNC: 7.4 NG/ML
SODIUM SERPL-SCNC: 143 MMOL/L
SPECIFIC GRAVITY URINE: 1.01
TACROLIMUS SERPL-MCNC: 3.3 NG/ML
UROBILINOGEN URINE: 0.2 MG/DL
WBC # FLD AUTO: 4.71 K/UL

## 2025-06-30 RX ORDER — TACROLIMUS 0.5 MG/1
0.5 CAPSULE ORAL
Qty: 60 | Refills: 11 | Status: ACTIVE | COMMUNITY
Start: 2025-06-30 | End: 1900-01-01

## 2025-07-15 ENCOUNTER — APPOINTMENT (OUTPATIENT)
Dept: NEPHROLOGY | Facility: CLINIC | Age: 80
End: 2025-07-15
Payer: MEDICARE

## 2025-07-15 VITALS
HEART RATE: 88 BPM | HEIGHT: 66 IN | TEMPERATURE: 97.9 F | BODY MASS INDEX: 31.98 KG/M2 | OXYGEN SATURATION: 91 % | RESPIRATION RATE: 16 BRPM | WEIGHT: 199 LBS

## 2025-07-15 VITALS — SYSTOLIC BLOOD PRESSURE: 128 MMHG | DIASTOLIC BLOOD PRESSURE: 60 MMHG

## 2025-07-15 PROCEDURE — 99214 OFFICE O/P EST MOD 30 MIN: CPT

## 2025-07-15 RX ORDER — AMOXICILLIN AND CLAVULANATE POTASSIUM 500; 125 MG/1; MG/1
500-125 TABLET, FILM COATED ORAL
Qty: 28 | Refills: 0 | Status: ACTIVE | COMMUNITY
Start: 2025-07-15 | End: 1900-01-01

## 2025-07-15 RX ORDER — CEPHALEXIN 500 MG/1
500 TABLET ORAL EVERY 8 HOURS
Qty: 42 | Refills: 0 | Status: DISCONTINUED | COMMUNITY
Start: 2025-07-15 | End: 2025-07-15

## 2025-08-05 ENCOUNTER — APPOINTMENT (OUTPATIENT)
Dept: NEPHROLOGY | Facility: CLINIC | Age: 80
End: 2025-08-05
Payer: MEDICARE

## 2025-08-05 VITALS
HEART RATE: 89 BPM | SYSTOLIC BLOOD PRESSURE: 136 MMHG | RESPIRATION RATE: 16 BRPM | OXYGEN SATURATION: 97 % | WEIGHT: 197 LBS | BODY MASS INDEX: 31.66 KG/M2 | DIASTOLIC BLOOD PRESSURE: 66 MMHG | TEMPERATURE: 98.7 F | HEIGHT: 66 IN

## 2025-08-05 DIAGNOSIS — E11.9 TYPE 2 DIABETES MELLITUS W/OUT COMPLICATIONS: ICD-10-CM

## 2025-08-05 DIAGNOSIS — K52.9 NONINFECTIVE GASTROENTERITIS AND COLITIS, UNSPECIFIED: ICD-10-CM

## 2025-08-05 DIAGNOSIS — Z94.0 OTHER LONG TERM (CURRENT) DRUG THERAPY: ICD-10-CM

## 2025-08-05 DIAGNOSIS — Z94.0 KIDNEY TRANSPLANT STATUS: ICD-10-CM

## 2025-08-05 DIAGNOSIS — Z79.899 OTHER LONG TERM (CURRENT) DRUG THERAPY: ICD-10-CM

## 2025-08-05 DIAGNOSIS — L03.119 CELLULITIS OF UNSPECIFIED PART OF LIMB: ICD-10-CM

## 2025-08-05 PROCEDURE — 99214 OFFICE O/P EST MOD 30 MIN: CPT

## 2025-08-05 RX ORDER — VANCOMYCIN HYDROCHLORIDE 250 MG/1
250 CAPSULE ORAL 4 TIMES DAILY
Qty: 120 | Refills: 1 | Status: ACTIVE | COMMUNITY
Start: 2025-08-05 | End: 1900-01-01

## 2025-08-05 RX ORDER — VANCOMYCIN HYDROCHLORIDE 50 MG/ML
50 KIT ORAL 3 TIMES DAILY
Qty: 3 | Refills: 1 | Status: DISCONTINUED | COMMUNITY
Start: 2025-08-05 | End: 2025-08-05

## 2025-08-06 ENCOUNTER — NON-APPOINTMENT (OUTPATIENT)
Age: 80
End: 2025-08-06

## 2025-08-06 LAB
ALBUMIN SERPL ELPH-MCNC: 4.5 G/DL
ALP BLD-CCNC: 113 U/L
ALT SERPL-CCNC: 42 U/L
ANION GAP SERPL CALC-SCNC: 12 MMOL/L
AST SERPL-CCNC: 29 U/L
BASOPHILS # BLD AUTO: 0.06 K/UL
BASOPHILS NFR BLD AUTO: 0.7 %
BILIRUB SERPL-MCNC: 0.7 MG/DL
BUN SERPL-MCNC: 48 MG/DL
CALCIUM SERPL-MCNC: 10.6 MG/DL
CHLORIDE SERPL-SCNC: 101 MMOL/L
CMV DNA SPEC QL NAA+PROBE: ABNORMAL IU/ML
CMVPCR LOG: ABNORMAL LOG10IU/ML
CO2 SERPL-SCNC: 26 MMOL/L
CREAT SERPL-MCNC: 1.67 MG/DL
EGFRCR SERPLBLD CKD-EPI 2021: 42 ML/MIN/1.73M2
EOSINOPHIL # BLD AUTO: 0.11 K/UL
EOSINOPHIL NFR BLD AUTO: 1.3 %
GLUCOSE SERPL-MCNC: 130 MG/DL
HCT VFR BLD CALC: 36 %
HGB BLD-MCNC: 11.5 G/DL
IMM GRANULOCYTES NFR BLD AUTO: 1.3 %
LYMPHOCYTES # BLD AUTO: 0.83 K/UL
LYMPHOCYTES NFR BLD AUTO: 9.7 %
MAGNESIUM SERPL-MCNC: 2.1 MG/DL
MAN DIFF?: NORMAL
MCHC RBC-ENTMCNC: 29.3 PG
MCHC RBC-ENTMCNC: 31.9 G/DL
MCV RBC AUTO: 91.8 FL
MONOCYTES # BLD AUTO: 0.61 K/UL
MONOCYTES NFR BLD AUTO: 7.1 %
NEUTROPHILS # BLD AUTO: 6.85 K/UL
NEUTROPHILS NFR BLD AUTO: 79.9 %
PHOSPHATE SERPL-MCNC: 3.9 MG/DL
PLATELET # BLD AUTO: 173 K/UL
POTASSIUM SERPL-SCNC: 4.3 MMOL/L
PROT SERPL-MCNC: 7 G/DL
RBC # BLD: 3.92 M/UL
RBC # FLD: 16.7 %
SIROLIMUS BLD-MCNC: <2 NG/ML
SODIUM SERPL-SCNC: 139 MMOL/L
TACROLIMUS SERPL-MCNC: 6.8 NG/ML
WBC # FLD AUTO: 8.57 K/UL

## 2025-08-11 ENCOUNTER — NON-APPOINTMENT (OUTPATIENT)
Age: 80
End: 2025-08-11

## 2025-08-11 LAB — BKV DNA SPEC QL NAA+PROBE: 700 IU/ML

## 2025-09-15 ENCOUNTER — APPOINTMENT (OUTPATIENT)
Dept: NEPHROLOGY | Facility: CLINIC | Age: 80
End: 2025-09-15
Payer: MEDICARE

## 2025-09-15 VITALS
TEMPERATURE: 97.9 F | HEIGHT: 66 IN | RESPIRATION RATE: 16 BRPM | SYSTOLIC BLOOD PRESSURE: 144 MMHG | BODY MASS INDEX: 30.86 KG/M2 | WEIGHT: 192 LBS | DIASTOLIC BLOOD PRESSURE: 72 MMHG | OXYGEN SATURATION: 91 % | HEART RATE: 89 BPM

## 2025-09-15 DIAGNOSIS — E11.9 TYPE 2 DIABETES MELLITUS W/OUT COMPLICATIONS: ICD-10-CM

## 2025-09-15 DIAGNOSIS — I10 ESSENTIAL (PRIMARY) HYPERTENSION: ICD-10-CM

## 2025-09-15 DIAGNOSIS — Z94.0 OTHER LONG TERM (CURRENT) DRUG THERAPY: ICD-10-CM

## 2025-09-15 DIAGNOSIS — Z79.899 OTHER LONG TERM (CURRENT) DRUG THERAPY: ICD-10-CM

## 2025-09-15 DIAGNOSIS — Z94.0 KIDNEY TRANSPLANT STATUS: ICD-10-CM

## 2025-09-15 LAB
ALBUMIN SERPL ELPH-MCNC: 4.2 G/DL
ALP BLD-CCNC: 113 U/L
ALT SERPL-CCNC: 45 U/L
ANION GAP SERPL CALC-SCNC: 18 MMOL/L
APPEARANCE: CLEAR
AST SERPL-CCNC: 24 U/L
BACTERIA: NEGATIVE /HPF
BILIRUB SERPL-MCNC: 0.5 MG/DL
BILIRUBIN URINE: NEGATIVE
BLOOD URINE: NEGATIVE
BUN SERPL-MCNC: 51 MG/DL
CALCIUM SERPL-MCNC: 9.9 MG/DL
CAST: 0 /LPF
CHLORIDE SERPL-SCNC: 100 MMOL/L
CO2 SERPL-SCNC: 24 MMOL/L
COLOR: YELLOW
CREAT SERPL-MCNC: 1.89 MG/DL
CREAT SPEC-SCNC: 65 MG/DL
CREAT/PROT UR: 0.1 RATIO
EGFRCR SERPLBLD CKD-EPI 2021: 36 ML/MIN/1.73M2
EPITHELIAL CELLS: 0 /HPF
GLUCOSE QUALITATIVE U: NEGATIVE MG/DL
GLUCOSE SERPL-MCNC: 157 MG/DL
HCT VFR BLD CALC: 39.6 %
HGB BLD-MCNC: 12.7 G/DL
KETONES URINE: NEGATIVE MG/DL
LDH SERPL-CCNC: 199 U/L
LEUKOCYTE ESTERASE URINE: NEGATIVE
MAGNESIUM SERPL-MCNC: 1.8 MG/DL
MCHC RBC-ENTMCNC: 30.3 PG
MCHC RBC-ENTMCNC: 32.1 G/DL
MCV RBC AUTO: 94.5 FL
MICROSCOPIC-UA: NORMAL
NITRITE URINE: NEGATIVE
PH URINE: 7.5
PHOSPHATE SERPL-MCNC: 3.7 MG/DL
PLATELET # BLD AUTO: 154 K/UL
POTASSIUM SERPL-SCNC: 4.3 MMOL/L
PROT SERPL-MCNC: 6.8 G/DL
PROT UR-MCNC: 5 MG/DL
PROTEIN URINE: NEGATIVE MG/DL
RBC # BLD: 4.19 M/UL
RBC # FLD: 17 %
RED BLOOD CELLS URINE: 0 /HPF
SIROLIMUS BLD-MCNC: <2 NG/ML
SODIUM SERPL-SCNC: 142 MMOL/L
SPECIFIC GRAVITY URINE: 1.02
TACROLIMUS SERPL-MCNC: 8.1 NG/ML
URATE SERPL-MCNC: 7.8 MG/DL
UROBILINOGEN URINE: 0.2 MG/DL
WBC # FLD AUTO: 8.81 K/UL
WHITE BLOOD CELLS URINE: 0 /HPF

## 2025-09-15 PROCEDURE — 99214 OFFICE O/P EST MOD 30 MIN: CPT

## 2025-09-17 LAB
BKV DNA SPEC QL NAA+PROBE: 189 IU/ML
CMV DNA SPEC QL NAA+PROBE: NOT DETECTED IU/ML
CMVPCR LOG: NOT DETECTED LOG10IU/ML

## 2025-09-22 PROBLEM — T14.8XXA WOUND OF SKIN: Status: ACTIVE | Noted: 2025-09-22

## (undated) DEVICE — WARMING BLANKET LOWER ADULT

## (undated) DEVICE — DRAPE SLUSH / WARMER 44 X 66"

## (undated) DEVICE — TUBING TRUWAVE PRESSURE MALE/FEMALE 72"

## (undated) DEVICE — STOPCOCK 4-WAY W SWIVEL MALE LUER LOCK NON VENTED RED CAP

## (undated) DEVICE — VESSEL LOOP MAXI-RED  0.120" X 16"

## (undated) DEVICE — VENODYNE/SCD SLEEVE CALF MEDIUM

## (undated) DEVICE — PREP CHLORAPREP HI-LITE ORANGE 26ML

## (undated) DEVICE — SUT SURGIPRO 1 60" GS-26

## (undated) DEVICE — DRSG SURG OPSITE 10X4"

## (undated) DEVICE — DRAIN RESERVOIR FOR JACKSON PRATT 100CC CARDINAL

## (undated) DEVICE — DRAPE ISOLATION BAG 20X20"

## (undated) DEVICE — Device

## (undated) DEVICE — SUT SOFSILK 2-0 18" V-20 (POP-OFF)

## (undated) DEVICE — GOWN XL EXTRA LONG

## (undated) DEVICE — PACK MAJOR ABDOMINAL SUPINE

## (undated) DEVICE — NDL COUNTER FOAM AND MAGNET 40-70

## (undated) DEVICE — DRAPE 3/4 SHEET W REINFORCEMENT 56X77"

## (undated) DEVICE — SUT SOFSILK 4-0 18" V-20

## (undated) DEVICE — SUT POLYSORB 3-0 30" V-20 UNDYED

## (undated) DEVICE — SUT SOFSILK 4-0 30" TIES

## (undated) DEVICE — SUT PDS II PLUS 5-0 30" RB-1

## (undated) DEVICE — WARMING BLANKET UPPER ADULT

## (undated) DEVICE — GLV 8.5 PROTEXIS (WHITE)

## (undated) DEVICE — SUT SOFSILK 2-0 30" TIES

## (undated) DEVICE — DRAPE TOWEL BLUE 17" X 24"

## (undated) DEVICE — AORTIC PUNCH 4.8 MM LONG HANDLE

## (undated) DEVICE — DRAIN PENROSE .25" X 18" LATEX

## (undated) DEVICE — SPECIMEN CONTAINER 100ML

## (undated) DEVICE — SUT SOFSILK 4-0 18" TIES

## (undated) DEVICE — SUT PROLENE 6-0 30" C-1

## (undated) DEVICE — BAG DECANTER DISP

## (undated) DEVICE — SYR LUER LOK 20CC

## (undated) DEVICE — DRAPE 1/2 SHEET 40X57"

## (undated) DEVICE — DRAPE GENERAL ENDOSCOPY

## (undated) DEVICE — SUT SOFSILK 2-0 18" TIES

## (undated) DEVICE — SOL IRR POUR NS 0.9% 500ML

## (undated) DEVICE — FOLEY TRAY 16FR LF URINE METER SURESTEP

## (undated) DEVICE — SUT PDS II PLUS 4-0 27" SH

## (undated) DEVICE — ELCTR BOVIE PENCIL SMOKE EVACUATION

## (undated) DEVICE — PACK BASIC GOWN

## (undated) DEVICE — PACK BASIN SPECIAL PROCEDURE

## (undated) DEVICE — SUT BOOT STANDARD (ASSORTED) 5 PAIR

## (undated) DEVICE — ELCTR BOVIE TIP BLADE INSULATED 2.75" EDGE

## (undated) DEVICE — STAPLER SKIN VISI-STAT 35 WIDE

## (undated) DEVICE — SOL IRR POUR H2O 250ML

## (undated) DEVICE — DRAPE IOBAN 23" X 23"

## (undated) DEVICE — ELCTR BOVIE TIP BLADE INSULATED 6.5" EDGE

## (undated) DEVICE — CATH IV SAFE BC 20G X 1.16" (PINK)

## (undated) DEVICE — SYR LUER LOK 50CC

## (undated) DEVICE — GLV 8 PROTEXIS (WHITE)